# Patient Record
Sex: MALE | Race: BLACK OR AFRICAN AMERICAN | NOT HISPANIC OR LATINO
[De-identification: names, ages, dates, MRNs, and addresses within clinical notes are randomized per-mention and may not be internally consistent; named-entity substitution may affect disease eponyms.]

---

## 2017-01-11 ENCOUNTER — APPOINTMENT (OUTPATIENT)
Dept: VASCULAR SURGERY | Facility: CLINIC | Age: 77
End: 2017-01-11

## 2017-01-11 ENCOUNTER — APPOINTMENT (OUTPATIENT)
Dept: WOUND CARE | Facility: CLINIC | Age: 77
End: 2017-01-11

## 2017-02-06 ENCOUNTER — APPOINTMENT (OUTPATIENT)
Dept: WOUND CARE | Facility: CLINIC | Age: 77
End: 2017-02-06

## 2017-03-29 ENCOUNTER — APPOINTMENT (OUTPATIENT)
Dept: WOUND CARE | Facility: CLINIC | Age: 77
End: 2017-03-29

## 2017-04-12 ENCOUNTER — APPOINTMENT (OUTPATIENT)
Dept: WOUND CARE | Facility: CLINIC | Age: 77
End: 2017-04-12

## 2017-05-19 ENCOUNTER — APPOINTMENT (OUTPATIENT)
Dept: WOUND CARE | Facility: CLINIC | Age: 77
End: 2017-05-19

## 2017-05-19 VITALS
HEIGHT: 68 IN | RESPIRATION RATE: 16 BRPM | HEART RATE: 77 BPM | SYSTOLIC BLOOD PRESSURE: 172 MMHG | TEMPERATURE: 97.6 F | DIASTOLIC BLOOD PRESSURE: 73 MMHG

## 2017-05-19 RX ORDER — COLLAGENASE SANTYL 250 [ARB'U]/G
250 OINTMENT TOPICAL DAILY
Qty: 90 | Refills: 5 | Status: ACTIVE | COMMUNITY
Start: 2017-05-19 | End: 1900-01-01

## 2017-05-22 ENCOUNTER — APPOINTMENT (OUTPATIENT)
Dept: MRI IMAGING | Facility: CLINIC | Age: 77
End: 2017-05-22

## 2017-06-03 ENCOUNTER — OUTPATIENT (OUTPATIENT)
Dept: OUTPATIENT SERVICES | Facility: HOSPITAL | Age: 77
LOS: 1 days | End: 2017-06-03
Payer: MEDICARE

## 2017-06-03 ENCOUNTER — APPOINTMENT (OUTPATIENT)
Dept: MRI IMAGING | Facility: CLINIC | Age: 77
End: 2017-06-03

## 2017-06-03 DIAGNOSIS — Z00.8 ENCOUNTER FOR OTHER GENERAL EXAMINATION: ICD-10-CM

## 2017-06-03 PROCEDURE — 73718 MRI LOWER EXTREMITY W/O DYE: CPT

## 2017-06-07 ENCOUNTER — APPOINTMENT (OUTPATIENT)
Dept: WOUND CARE | Facility: CLINIC | Age: 77
End: 2017-06-07

## 2017-06-07 VITALS
BODY MASS INDEX: 27.58 KG/M2 | HEART RATE: 63 BPM | WEIGHT: 182 LBS | SYSTOLIC BLOOD PRESSURE: 164 MMHG | DIASTOLIC BLOOD PRESSURE: 85 MMHG | HEIGHT: 68 IN | RESPIRATION RATE: 16 BRPM

## 2017-07-05 ENCOUNTER — APPOINTMENT (OUTPATIENT)
Dept: WOUND CARE | Facility: CLINIC | Age: 77
End: 2017-07-05

## 2017-07-11 ENCOUNTER — APPOINTMENT (OUTPATIENT)
Dept: WOUND CARE | Facility: CLINIC | Age: 77
End: 2017-07-11
Payer: MEDICARE

## 2017-07-11 PROCEDURE — 11042 DBRDMT SUBQ TIS 1ST 20SQCM/<: CPT

## 2017-07-19 ENCOUNTER — APPOINTMENT (OUTPATIENT)
Dept: WOUND CARE | Facility: CLINIC | Age: 77
End: 2017-07-19

## 2017-08-01 ENCOUNTER — APPOINTMENT (OUTPATIENT)
Dept: WOUND CARE | Facility: CLINIC | Age: 77
End: 2017-08-01
Payer: MEDICARE

## 2017-08-01 VITALS
DIASTOLIC BLOOD PRESSURE: 81 MMHG | TEMPERATURE: 98.6 F | BODY MASS INDEX: 27.58 KG/M2 | HEIGHT: 68 IN | SYSTOLIC BLOOD PRESSURE: 157 MMHG | WEIGHT: 182 LBS | RESPIRATION RATE: 16 BRPM | HEART RATE: 68 BPM

## 2017-08-01 PROCEDURE — 11042 DBRDMT SUBQ TIS 1ST 20SQCM/<: CPT

## 2017-08-11 ENCOUNTER — APPOINTMENT (OUTPATIENT)
Dept: WOUND CARE | Facility: CLINIC | Age: 77
End: 2017-08-11
Payer: MEDICARE

## 2017-08-11 VITALS
WEIGHT: 182 LBS | HEART RATE: 59 BPM | DIASTOLIC BLOOD PRESSURE: 96 MMHG | HEIGHT: 68 IN | TEMPERATURE: 97.5 F | SYSTOLIC BLOOD PRESSURE: 164 MMHG | BODY MASS INDEX: 27.58 KG/M2 | RESPIRATION RATE: 16 BRPM

## 2017-08-11 PROCEDURE — 11042 DBRDMT SUBQ TIS 1ST 20SQCM/<: CPT

## 2017-08-11 PROCEDURE — 99214 OFFICE O/P EST MOD 30 MIN: CPT

## 2017-09-01 ENCOUNTER — APPOINTMENT (OUTPATIENT)
Dept: WOUND CARE | Facility: CLINIC | Age: 77
End: 2017-09-01
Payer: MEDICARE

## 2017-09-01 VITALS
DIASTOLIC BLOOD PRESSURE: 60 MMHG | SYSTOLIC BLOOD PRESSURE: 150 MMHG | HEART RATE: 60 BPM | TEMPERATURE: 98.6 F | RESPIRATION RATE: 16 BRPM

## 2017-09-01 PROCEDURE — 11042 DBRDMT SUBQ TIS 1ST 20SQCM/<: CPT

## 2017-09-13 LAB — BACTERIA WND CULT: ABNORMAL

## 2017-09-29 ENCOUNTER — APPOINTMENT (OUTPATIENT)
Dept: WOUND CARE | Facility: CLINIC | Age: 77
End: 2017-09-29
Payer: MEDICARE

## 2017-09-29 PROCEDURE — 99213 OFFICE O/P EST LOW 20 MIN: CPT

## 2017-09-29 RX ORDER — LUBIPROSTONE 24 UG/1
24 CAPSULE, GELATIN COATED ORAL
Qty: 60 | Refills: 0 | Status: ACTIVE | COMMUNITY
Start: 2017-06-30

## 2017-09-29 RX ORDER — OXYCODONE AND ACETAMINOPHEN 5; 325 MG/1; MG/1
5-325 TABLET ORAL
Qty: 120 | Refills: 0 | Status: COMPLETED | COMMUNITY
Start: 2017-05-05

## 2017-09-29 RX ORDER — DONEPEZIL HYDROCHLORIDE 5 MG/1
5 TABLET ORAL
Qty: 30 | Refills: 0 | Status: ACTIVE | COMMUNITY
Start: 2017-03-02

## 2017-09-29 RX ORDER — ASPIRIN 81 MG/1
81 TABLET, CHEWABLE ORAL
Qty: 90 | Refills: 0 | Status: DISCONTINUED | COMMUNITY
Start: 2017-03-02

## 2017-09-29 RX ORDER — DOCUSATE SODIUM 100 MG/1
100 CAPSULE ORAL
Qty: 60 | Refills: 0 | Status: ACTIVE | COMMUNITY
Start: 2017-03-02

## 2017-09-29 RX ORDER — CLINDAMYCIN HYDROCHLORIDE 300 MG/1
300 CAPSULE ORAL
Qty: 30 | Refills: 0 | Status: COMPLETED | COMMUNITY
Start: 2017-07-11 | End: 2017-09-29

## 2017-09-29 RX ORDER — METOPROLOL SUCCINATE 50 MG/1
50 TABLET, EXTENDED RELEASE ORAL
Qty: 90 | Refills: 0 | Status: ACTIVE | COMMUNITY
Start: 2017-09-18

## 2017-09-29 RX ORDER — MUPIROCIN 20 MG/G
2 OINTMENT TOPICAL
Qty: 60 | Refills: 3 | Status: COMPLETED | COMMUNITY
Start: 2017-07-11 | End: 2017-09-29

## 2017-09-29 RX ORDER — FLUTICASONE PROPIONATE 50 UG/1
50 SPRAY, METERED NASAL
Qty: 16 | Refills: 0 | Status: ACTIVE | COMMUNITY
Start: 2017-03-24

## 2017-09-29 RX ORDER — HYDROCODONE BITARTRATE AND ACETAMINOPHEN 7.5; 325 MG/1; MG/1
7.5-325 TABLET ORAL
Qty: 120 | Refills: 0 | Status: ACTIVE | COMMUNITY
Start: 2017-06-30

## 2017-09-29 RX ORDER — CHLORHEXIDINE GLUCONATE 4 %
325 (65 FE) LIQUID (ML) TOPICAL
Qty: 30 | Refills: 0 | Status: ACTIVE | COMMUNITY
Start: 2016-08-08

## 2017-10-25 ENCOUNTER — APPOINTMENT (OUTPATIENT)
Dept: WOUND CARE | Facility: CLINIC | Age: 77
End: 2017-10-25

## 2017-11-10 ENCOUNTER — APPOINTMENT (OUTPATIENT)
Dept: WOUND CARE | Facility: CLINIC | Age: 77
End: 2017-11-10
Payer: MEDICARE

## 2017-11-10 DIAGNOSIS — I77.1 STRICTURE OF ARTERY: ICD-10-CM

## 2017-11-10 DIAGNOSIS — L98.499 STRICTURE OF ARTERY: ICD-10-CM

## 2017-11-10 DIAGNOSIS — S86.029A: ICD-10-CM

## 2017-11-10 PROCEDURE — 11042 DBRDMT SUBQ TIS 1ST 20SQCM/<: CPT | Mod: 58

## 2017-12-01 ENCOUNTER — APPOINTMENT (OUTPATIENT)
Dept: WOUND CARE | Facility: CLINIC | Age: 77
End: 2017-12-01
Payer: MEDICARE

## 2017-12-01 VITALS
HEART RATE: 77 BPM | RESPIRATION RATE: 16 BRPM | SYSTOLIC BLOOD PRESSURE: 166 MMHG | TEMPERATURE: 97.6 F | DIASTOLIC BLOOD PRESSURE: 91 MMHG | BODY MASS INDEX: 27.58 KG/M2 | HEIGHT: 68 IN | WEIGHT: 182 LBS

## 2017-12-01 DIAGNOSIS — L97.529 NON-PRESSURE CHRONIC ULCER OF OTHER PART OF LEFT FOOT WITH UNSPECIFIED SEVERITY: ICD-10-CM

## 2017-12-01 DIAGNOSIS — L97.922 NON-PRESSURE CHRONIC ULCER OF UNSPECIFIED PART OF LEFT LOWER LEG WITH FAT LAYER EXPOSED: ICD-10-CM

## 2017-12-01 PROCEDURE — 11042 DBRDMT SUBQ TIS 1ST 20SQCM/<: CPT | Mod: 58

## 2017-12-01 PROCEDURE — 99213 OFFICE O/P EST LOW 20 MIN: CPT | Mod: 25

## 2018-01-03 ENCOUNTER — APPOINTMENT (OUTPATIENT)
Dept: WOUND CARE | Facility: CLINIC | Age: 78
End: 2018-01-03
Payer: MEDICARE

## 2018-01-03 VITALS
RESPIRATION RATE: 16 BRPM | DIASTOLIC BLOOD PRESSURE: 102 MMHG | HEART RATE: 76 BPM | TEMPERATURE: 98.4 F | SYSTOLIC BLOOD PRESSURE: 178 MMHG

## 2018-01-03 DIAGNOSIS — I89.0 LYMPHEDEMA, NOT ELSEWHERE CLASSIFIED: ICD-10-CM

## 2018-01-03 PROCEDURE — 99213 OFFICE O/P EST LOW 20 MIN: CPT | Mod: 25

## 2018-01-03 PROCEDURE — 11042 DBRDMT SUBQ TIS 1ST 20SQCM/<: CPT

## 2018-02-02 ENCOUNTER — APPOINTMENT (OUTPATIENT)
Dept: WOUND CARE | Facility: CLINIC | Age: 78
End: 2018-02-02
Payer: MEDICARE

## 2018-02-02 VITALS
SYSTOLIC BLOOD PRESSURE: 148 MMHG | RESPIRATION RATE: 16 BRPM | TEMPERATURE: 98.4 F | DIASTOLIC BLOOD PRESSURE: 80 MMHG | HEART RATE: 78 BPM

## 2018-02-02 PROCEDURE — 99213 OFFICE O/P EST LOW 20 MIN: CPT

## 2018-02-28 ENCOUNTER — APPOINTMENT (OUTPATIENT)
Dept: WOUND CARE | Facility: CLINIC | Age: 78
End: 2018-02-28
Payer: MEDICARE

## 2018-02-28 PROCEDURE — 97597 DBRDMT OPN WND 1ST 20 CM/<: CPT

## 2018-02-28 RX ORDER — LIDOCAINE AND PRILOCAINE 25; 25 MG/G; MG/G
2.5-2.5 CREAM TOPICAL
Qty: 1 | Refills: 3 | Status: ACTIVE | COMMUNITY
Start: 2018-02-28 | End: 1900-01-01

## 2018-03-16 ENCOUNTER — APPOINTMENT (OUTPATIENT)
Dept: WOUND CARE | Facility: CLINIC | Age: 78
End: 2018-03-16
Payer: MEDICARE

## 2018-03-16 PROCEDURE — 99213 OFFICE O/P EST LOW 20 MIN: CPT

## 2018-03-29 ENCOUNTER — APPOINTMENT (OUTPATIENT)
Dept: VASCULAR SURGERY | Facility: CLINIC | Age: 78
End: 2018-03-29

## 2018-03-29 ENCOUNTER — EMERGENCY (EMERGENCY)
Facility: HOSPITAL | Age: 78
LOS: 1 days | Discharge: HOME | End: 2018-03-29
Attending: EMERGENCY MEDICINE | Admitting: EMERGENCY MEDICINE
Payer: MEDICARE

## 2018-03-29 VITALS
HEIGHT: 68 IN | WEIGHT: 182 LBS | DIASTOLIC BLOOD PRESSURE: 123 MMHG | BODY MASS INDEX: 27.58 KG/M2 | HEART RATE: 88 BPM | SYSTOLIC BLOOD PRESSURE: 222 MMHG

## 2018-03-29 VITALS — HEART RATE: 91 BPM | SYSTOLIC BLOOD PRESSURE: 217 MMHG | DIASTOLIC BLOOD PRESSURE: 138 MMHG

## 2018-03-29 VITALS
HEART RATE: 75 BPM | TEMPERATURE: 98 F | RESPIRATION RATE: 20 BRPM | DIASTOLIC BLOOD PRESSURE: 91 MMHG | OXYGEN SATURATION: 100 % | SYSTOLIC BLOOD PRESSURE: 163 MMHG

## 2018-03-29 PROCEDURE — 99283 EMERGENCY DEPT VISIT LOW MDM: CPT

## 2018-03-29 PROCEDURE — 71046 X-RAY EXAM CHEST 2 VIEWS: CPT | Mod: 26

## 2018-03-29 NOTE — ED PROVIDER NOTE - OBJECTIVE STATEMENT
77y M hx of borderline diabetes and chronic right foot ulceration presenting s/p choking episode. The family notes pt was at vascular surgeon's office and choked on a cookie. After the episode pt was hypertensive with BP of 200. No syncope, no cp, no vomiting. Pt currently denies any complaints. No sob, no difficulty swallowing. Daughter notes in the past pt had difficulty swallowing and is being seen by a speech therapist.

## 2018-03-29 NOTE — ED PROVIDER NOTE - MEDICAL DECISION MAKING DETAILS
77y M s/p choking episode and HTN. Blood pressure significantly improved on arrival. No evidence of respiratory distress. Will obtain CXR to r/o aspiration injury, PO challenge, and discharge home.

## 2018-03-29 NOTE — ED PROVIDER NOTE - PMH
BPH (benign prostatic hypertrophy)    Diabetes mellitus  Type 2 NIDDM  GERD (gastroesophageal reflux disease)    Hyperlipidemia    Hypertension    Obesity    PVD (peripheral vascular disease)    Ulcer of foot

## 2018-03-29 NOTE — ED PROVIDER NOTE - PROGRESS NOTE DETAILS
AJM: CXR unremarkable for acute events. again no resp distress or coughing. stable for dc home. All results discussed with the patient, and a copy of results has been provided. Patient is comfortable with dc plan for home. Opportunity for questions was provided and all questions have been addressed.

## 2018-03-29 NOTE — ED ADULT TRIAGE NOTE - CHIEF COMPLAINT QUOTE
Pt was at vascular surgeon for unhealed ulcer on Rt foot, and started choking on a cookie, when pt had his vitals taken he was hypertensive and was instructed to go to ED for htn and possible aspiration, pt a&ox3, no resp distress at this time. Pt breathing even and unlabored, speaking full sentences. Pt was at vascular surgeon for unhealed ulcer on Lt foot, and started choking on a cookie, when pt had his vitals taken he was hypertensive and was instructed to go to ED for htn and possible aspiration, pt a&ox3, no resp distress at this time. Pt breathing even and unlabored, speaking full sentences.

## 2018-04-11 ENCOUNTER — APPOINTMENT (OUTPATIENT)
Dept: WOUND CARE | Facility: CLINIC | Age: 78
End: 2018-04-11
Payer: MEDICARE

## 2018-04-11 VITALS
TEMPERATURE: 98.14 F | DIASTOLIC BLOOD PRESSURE: 82 MMHG | SYSTOLIC BLOOD PRESSURE: 166 MMHG | BODY MASS INDEX: 27.58 KG/M2 | HEART RATE: 86 BPM | WEIGHT: 182 LBS | HEIGHT: 68 IN

## 2018-04-11 PROCEDURE — 29581 APPL MULTLAYER CMPRN SYS LEG: CPT | Mod: RT

## 2018-04-25 ENCOUNTER — APPOINTMENT (OUTPATIENT)
Dept: WOUND CARE | Facility: CLINIC | Age: 78
End: 2018-04-25
Payer: MEDICARE

## 2018-04-25 VITALS
WEIGHT: 182 LBS | DIASTOLIC BLOOD PRESSURE: 84 MMHG | BODY MASS INDEX: 27.58 KG/M2 | HEART RATE: 79 BPM | HEIGHT: 68 IN | SYSTOLIC BLOOD PRESSURE: 178 MMHG | RESPIRATION RATE: 18 BRPM | TEMPERATURE: 98 F

## 2018-04-25 DIAGNOSIS — Z86.39 PERSONAL HISTORY OF OTHER ENDOCRINE, NUTRITIONAL AND METABOLIC DISEASE: ICD-10-CM

## 2018-04-25 PROCEDURE — 29581 APPL MULTLAYER CMPRN SYS LEG: CPT | Mod: RT

## 2018-05-14 ENCOUNTER — APPOINTMENT (OUTPATIENT)
Dept: WOUND CARE | Facility: CLINIC | Age: 78
End: 2018-05-14
Payer: MEDICARE

## 2018-05-14 VITALS
BODY MASS INDEX: 27.58 KG/M2 | DIASTOLIC BLOOD PRESSURE: 104 MMHG | TEMPERATURE: 98.3 F | WEIGHT: 182 LBS | SYSTOLIC BLOOD PRESSURE: 182 MMHG | HEART RATE: 84 BPM | HEIGHT: 68 IN

## 2018-05-14 PROCEDURE — 29581 APPL MULTLAYER CMPRN SYS LEG: CPT | Mod: RT

## 2018-05-30 ENCOUNTER — APPOINTMENT (OUTPATIENT)
Dept: WOUND CARE | Facility: CLINIC | Age: 78
End: 2018-05-30
Payer: MEDICARE

## 2018-05-30 VITALS
WEIGHT: 182 LBS | SYSTOLIC BLOOD PRESSURE: 164 MMHG | BODY MASS INDEX: 27.58 KG/M2 | DIASTOLIC BLOOD PRESSURE: 93 MMHG | TEMPERATURE: 98.2 F | HEART RATE: 78 BPM | HEIGHT: 68 IN | RESPIRATION RATE: 18 BRPM

## 2018-05-30 PROCEDURE — 15271 SKIN SUB GRAFT TRNK/ARM/LEG: CPT

## 2018-06-11 ENCOUNTER — APPOINTMENT (OUTPATIENT)
Dept: WOUND CARE | Facility: CLINIC | Age: 78
End: 2018-06-11
Payer: MEDICARE

## 2018-06-11 VITALS
DIASTOLIC BLOOD PRESSURE: 74 MMHG | HEART RATE: 83 BPM | RESPIRATION RATE: 18 BRPM | BODY MASS INDEX: 27.58 KG/M2 | TEMPERATURE: 98.6 F | WEIGHT: 182 LBS | HEIGHT: 68 IN | SYSTOLIC BLOOD PRESSURE: 155 MMHG

## 2018-06-11 PROCEDURE — 29581 APPL MULTLAYER CMPRN SYS LEG: CPT | Mod: RT

## 2018-06-27 ENCOUNTER — APPOINTMENT (OUTPATIENT)
Dept: WOUND CARE | Facility: CLINIC | Age: 78
End: 2018-06-27
Payer: MEDICARE

## 2018-06-27 VITALS
SYSTOLIC BLOOD PRESSURE: 162 MMHG | HEIGHT: 68 IN | DIASTOLIC BLOOD PRESSURE: 80 MMHG | TEMPERATURE: 98.2 F | HEART RATE: 87 BPM | BODY MASS INDEX: 27.58 KG/M2 | WEIGHT: 182 LBS

## 2018-06-27 DIAGNOSIS — I70.245 ATHEROSCLEROSIS OF NATIVE ARTERIES OF RIGHT LEG WITH ULCERATION OF OTHER PART OF FOOT: ICD-10-CM

## 2018-06-27 DIAGNOSIS — L97.512 NON-PRESSURE CHRONIC ULCER OF OTHER PART OF RIGHT FOOT WITH FAT LAYER EXPOSED: ICD-10-CM

## 2018-06-27 DIAGNOSIS — I70.235 ATHEROSCLEROSIS OF NATIVE ARTERIES OF RIGHT LEG WITH ULCERATION OF OTHER PART OF FOOT: ICD-10-CM

## 2018-06-27 PROCEDURE — 15271 SKIN SUB GRAFT TRNK/ARM/LEG: CPT

## 2018-07-06 ENCOUNTER — APPOINTMENT (OUTPATIENT)
Dept: WOUND CARE | Facility: CLINIC | Age: 78
End: 2018-07-06

## 2018-07-09 ENCOUNTER — APPOINTMENT (OUTPATIENT)
Dept: WOUND CARE | Facility: CLINIC | Age: 78
End: 2018-07-09
Payer: MEDICARE

## 2018-07-09 PROCEDURE — 29581 APPL MULTLAYER CMPRN SYS LEG: CPT | Mod: RT

## 2018-07-22 PROBLEM — S86.029A: Status: ACTIVE | Noted: 2017-11-10

## 2018-07-27 ENCOUNTER — APPOINTMENT (OUTPATIENT)
Dept: WOUND CARE | Facility: CLINIC | Age: 78
End: 2018-07-27
Payer: MEDICARE

## 2018-07-27 VITALS
TEMPERATURE: 98 F | WEIGHT: 182 LBS | HEART RATE: 73 BPM | BODY MASS INDEX: 27.58 KG/M2 | HEIGHT: 68 IN | DIASTOLIC BLOOD PRESSURE: 73 MMHG | RESPIRATION RATE: 16 BRPM | SYSTOLIC BLOOD PRESSURE: 120 MMHG

## 2018-07-27 PROCEDURE — 99213 OFFICE O/P EST LOW 20 MIN: CPT

## 2018-08-08 ENCOUNTER — APPOINTMENT (OUTPATIENT)
Dept: WOUND CARE | Facility: CLINIC | Age: 78
End: 2018-08-08
Payer: MEDICARE

## 2018-08-08 PROCEDURE — 15271 SKIN SUB GRAFT TRNK/ARM/LEG: CPT

## 2018-08-08 RX ORDER — BLOOD-GLUCOSE METER
KIT MISCELLANEOUS
Qty: 1 | Refills: 0 | Status: ACTIVE | COMMUNITY
Start: 2018-02-15

## 2018-08-08 RX ORDER — TAMSULOSIN HYDROCHLORIDE 0.4 MG/1
0.4 CAPSULE ORAL
Qty: 90 | Refills: 0 | Status: ACTIVE | COMMUNITY
Start: 2018-05-05

## 2018-08-08 RX ORDER — LANCETS 28 GAUGE
EACH MISCELLANEOUS
Qty: 100 | Refills: 0 | Status: ACTIVE | COMMUNITY
Start: 2018-02-15

## 2018-08-08 RX ORDER — BLOOD SUGAR DIAGNOSTIC
STRIP MISCELLANEOUS
Qty: 100 | Refills: 0 | Status: ACTIVE | COMMUNITY
Start: 2018-02-15

## 2018-08-17 ENCOUNTER — APPOINTMENT (OUTPATIENT)
Dept: WOUND CARE | Facility: CLINIC | Age: 78
End: 2018-08-17
Payer: MEDICARE

## 2018-08-17 VITALS — TEMPERATURE: 98.4 F | DIASTOLIC BLOOD PRESSURE: 71 MMHG | HEART RATE: 61 BPM | SYSTOLIC BLOOD PRESSURE: 116 MMHG

## 2018-08-17 PROCEDURE — 29581 APPL MULTLAYER CMPRN SYS LEG: CPT | Mod: RT

## 2018-08-29 ENCOUNTER — APPOINTMENT (OUTPATIENT)
Dept: WOUND CARE | Facility: CLINIC | Age: 78
End: 2018-08-29
Payer: MEDICARE

## 2018-08-29 DIAGNOSIS — M21.619 BUNION OF UNSPECIFIED FOOT: ICD-10-CM

## 2018-08-29 DIAGNOSIS — B35.1 TINEA UNGUIUM: ICD-10-CM

## 2018-08-29 PROCEDURE — 99212 OFFICE O/P EST SF 10 MIN: CPT

## 2018-09-05 ENCOUNTER — APPOINTMENT (OUTPATIENT)
Dept: WOUND CARE | Facility: CLINIC | Age: 78
End: 2018-09-05
Payer: MEDICARE

## 2018-09-05 VITALS — HEART RATE: 69 BPM | SYSTOLIC BLOOD PRESSURE: 110 MMHG | DIASTOLIC BLOOD PRESSURE: 70 MMHG

## 2018-09-05 PROCEDURE — 99213 OFFICE O/P EST LOW 20 MIN: CPT

## 2018-09-26 ENCOUNTER — APPOINTMENT (OUTPATIENT)
Dept: WOUND CARE | Facility: CLINIC | Age: 78
End: 2018-09-26
Payer: MEDICARE

## 2018-09-26 VITALS — HEART RATE: 59 BPM | SYSTOLIC BLOOD PRESSURE: 133 MMHG | DIASTOLIC BLOOD PRESSURE: 79 MMHG | TEMPERATURE: 98.2 F

## 2018-09-26 PROCEDURE — 15271 SKIN SUB GRAFT TRNK/ARM/LEG: CPT

## 2018-10-03 ENCOUNTER — APPOINTMENT (OUTPATIENT)
Dept: WOUND CARE | Facility: CLINIC | Age: 78
End: 2018-10-03
Payer: MEDICARE

## 2018-10-03 VITALS — HEART RATE: 90 BPM | TEMPERATURE: 98.4 F | SYSTOLIC BLOOD PRESSURE: 117 MMHG | DIASTOLIC BLOOD PRESSURE: 70 MMHG

## 2018-10-03 PROCEDURE — 29581 APPL MULTLAYER CMPRN SYS LEG: CPT | Mod: RT

## 2018-10-29 ENCOUNTER — APPOINTMENT (OUTPATIENT)
Dept: WOUND CARE | Facility: CLINIC | Age: 78
End: 2018-10-29
Payer: MEDICARE

## 2018-10-29 PROCEDURE — 29581 APPL MULTLAYER CMPRN SYS LEG: CPT | Mod: RT

## 2018-11-19 ENCOUNTER — APPOINTMENT (OUTPATIENT)
Dept: WOUND CARE | Facility: CLINIC | Age: 78
End: 2018-11-19
Payer: MEDICARE

## 2018-11-19 VITALS — HEART RATE: 74 BPM | TEMPERATURE: 98.3 F | SYSTOLIC BLOOD PRESSURE: 136 MMHG | DIASTOLIC BLOOD PRESSURE: 80 MMHG

## 2018-11-19 PROCEDURE — 29581 APPL MULTLAYER CMPRN SYS LEG: CPT | Mod: RT

## 2018-12-12 ENCOUNTER — APPOINTMENT (OUTPATIENT)
Dept: WOUND CARE | Facility: CLINIC | Age: 78
End: 2018-12-12
Payer: MEDICARE

## 2018-12-12 DIAGNOSIS — L97.919 CHRONIC VENOUS HYPERTENSION (IDIOPATHIC) WITH ULCER OF RIGHT LOWER EXTREMITY: ICD-10-CM

## 2018-12-12 DIAGNOSIS — I87.311 CHRONIC VENOUS HYPERTENSION (IDIOPATHIC) WITH ULCER OF RIGHT LOWER EXTREMITY: ICD-10-CM

## 2018-12-12 PROCEDURE — 29581 APPL MULTLAYER CMPRN SYS LEG: CPT | Mod: RT

## 2018-12-31 ENCOUNTER — APPOINTMENT (OUTPATIENT)
Dept: WOUND CARE | Facility: CLINIC | Age: 78
End: 2018-12-31
Payer: MEDICARE

## 2018-12-31 PROCEDURE — 99213 OFFICE O/P EST LOW 20 MIN: CPT

## 2019-02-01 ENCOUNTER — EMERGENCY (EMERGENCY)
Facility: HOSPITAL | Age: 79
LOS: 0 days | Discharge: HOME | End: 2019-02-01
Attending: EMERGENCY MEDICINE
Payer: MEDICARE

## 2019-02-01 VITALS
TEMPERATURE: 98 F | SYSTOLIC BLOOD PRESSURE: 139 MMHG | HEART RATE: 70 BPM | OXYGEN SATURATION: 96 % | DIASTOLIC BLOOD PRESSURE: 74 MMHG | HEIGHT: 65 IN | RESPIRATION RATE: 18 BRPM | WEIGHT: 190.04 LBS

## 2019-02-01 VITALS
HEART RATE: 60 BPM | RESPIRATION RATE: 18 BRPM | SYSTOLIC BLOOD PRESSURE: 133 MMHG | OXYGEN SATURATION: 98 % | TEMPERATURE: 98 F | DIASTOLIC BLOOD PRESSURE: 64 MMHG

## 2019-02-01 DIAGNOSIS — Z88.0 ALLERGY STATUS TO PENICILLIN: ICD-10-CM

## 2019-02-01 DIAGNOSIS — R55 SYNCOPE AND COLLAPSE: ICD-10-CM

## 2019-02-01 DIAGNOSIS — R73.03 PREDIABETES: ICD-10-CM

## 2019-02-01 LAB
ALBUMIN SERPL ELPH-MCNC: 3 G/DL — LOW (ref 3.3–5)
ALP SERPL-CCNC: 85 U/L — SIGNIFICANT CHANGE UP (ref 40–120)
ALT FLD-CCNC: 16 U/L — SIGNIFICANT CHANGE UP (ref 12–78)
AMMONIA BLD-MCNC: 32 UMOL/L — SIGNIFICANT CHANGE UP (ref 11–32)
ANION GAP SERPL CALC-SCNC: 8 MMOL/L — SIGNIFICANT CHANGE UP (ref 5–17)
APAP SERPL-MCNC: 11 UG/ML — SIGNIFICANT CHANGE UP (ref 10–30)
APPEARANCE UR: CLEAR — SIGNIFICANT CHANGE UP
APTT BLD: 26.3 SEC — LOW (ref 27.5–36.3)
AST SERPL-CCNC: 9 U/L — LOW (ref 15–37)
BILIRUB SERPL-MCNC: 0.2 MG/DL — SIGNIFICANT CHANGE UP (ref 0.2–1.2)
BILIRUB UR-MCNC: NEGATIVE — SIGNIFICANT CHANGE UP
BUN SERPL-MCNC: 28 MG/DL — HIGH (ref 7–23)
CALCIUM SERPL-MCNC: 8.6 MG/DL — SIGNIFICANT CHANGE UP (ref 8.5–10.1)
CHLORIDE SERPL-SCNC: 104 MMOL/L — SIGNIFICANT CHANGE UP (ref 96–108)
CK MB CFR SERPL CALC: 2.1 NG/ML — SIGNIFICANT CHANGE UP (ref 0.5–3.6)
CO2 SERPL-SCNC: 25 MMOL/L — SIGNIFICANT CHANGE UP (ref 22–31)
COLOR SPEC: YELLOW — SIGNIFICANT CHANGE UP
CREAT SERPL-MCNC: 2.08 MG/DL — HIGH (ref 0.5–1.3)
DIFF PNL FLD: NEGATIVE — SIGNIFICANT CHANGE UP
GLUCOSE BLDC GLUCOMTR-MCNC: 297 MG/DL — HIGH (ref 70–99)
GLUCOSE SERPL-MCNC: 338 MG/DL — HIGH (ref 70–99)
GLUCOSE UR QL: 1000 MG/DL
HCT VFR BLD CALC: 30.8 % — LOW (ref 39–50)
HGB BLD-MCNC: 10.9 G/DL — LOW (ref 13–17)
INR BLD: 0.99 RATIO — SIGNIFICANT CHANGE UP (ref 0.88–1.16)
KETONES UR-MCNC: NEGATIVE — SIGNIFICANT CHANGE UP
LACTATE SERPL-SCNC: 3.4 MMOL/L — HIGH (ref 0.7–2)
LACTATE SERPL-SCNC: 4.9 MMOL/L — CRITICAL HIGH (ref 0.7–2)
LEUKOCYTE ESTERASE UR-ACNC: NEGATIVE — SIGNIFICANT CHANGE UP
LIDOCAIN IGE QN: 97 U/L — SIGNIFICANT CHANGE UP (ref 73–393)
MCHC RBC-ENTMCNC: 31.4 PG — SIGNIFICANT CHANGE UP (ref 27–34)
MCHC RBC-ENTMCNC: 35.4 GM/DL — SIGNIFICANT CHANGE UP (ref 32–36)
MCV RBC AUTO: 88.8 FL — SIGNIFICANT CHANGE UP (ref 80–100)
NITRITE UR-MCNC: NEGATIVE — SIGNIFICANT CHANGE UP
NRBC # BLD: 0 /100 WBCS — SIGNIFICANT CHANGE UP (ref 0–0)
PH UR: 5 — SIGNIFICANT CHANGE UP (ref 5–8)
PLATELET # BLD AUTO: 260 K/UL — SIGNIFICANT CHANGE UP (ref 150–400)
POTASSIUM SERPL-MCNC: 4.5 MMOL/L — SIGNIFICANT CHANGE UP (ref 3.5–5.3)
POTASSIUM SERPL-SCNC: 4.5 MMOL/L — SIGNIFICANT CHANGE UP (ref 3.5–5.3)
PROT SERPL-MCNC: 7.9 GM/DL — SIGNIFICANT CHANGE UP (ref 6–8.3)
PROT UR-MCNC: NEGATIVE MG/DL — SIGNIFICANT CHANGE UP
PROTHROM AB SERPL-ACNC: 11.1 SEC — SIGNIFICANT CHANGE UP (ref 10–12.9)
RBC # BLD: 3.47 M/UL — LOW (ref 4.2–5.8)
RBC # FLD: 13 % — SIGNIFICANT CHANGE UP (ref 10.3–14.5)
SALICYLATES SERPL-MCNC: <1.7 MG/DL — LOW (ref 2.8–20)
SODIUM SERPL-SCNC: 137 MMOL/L — SIGNIFICANT CHANGE UP (ref 135–145)
SP GR SPEC: 1.01 — SIGNIFICANT CHANGE UP (ref 1.01–1.02)
TROPONIN I SERPL-MCNC: <.015 NG/ML — SIGNIFICANT CHANGE UP (ref 0.01–0.04)
UROBILINOGEN FLD QL: NEGATIVE MG/DL — SIGNIFICANT CHANGE UP
WBC # BLD: 7.18 K/UL — SIGNIFICANT CHANGE UP (ref 3.8–10.5)
WBC # FLD AUTO: 7.18 K/UL — SIGNIFICANT CHANGE UP (ref 3.8–10.5)

## 2019-02-01 PROCEDURE — 71045 X-RAY EXAM CHEST 1 VIEW: CPT | Mod: 26

## 2019-02-01 PROCEDURE — 99285 EMERGENCY DEPT VISIT HI MDM: CPT

## 2019-02-01 PROCEDURE — 70450 CT HEAD/BRAIN W/O DYE: CPT | Mod: 26

## 2019-02-01 RX ORDER — SODIUM CHLORIDE 9 MG/ML
2000 INJECTION INTRAMUSCULAR; INTRAVENOUS; SUBCUTANEOUS ONCE
Qty: 0 | Refills: 0 | Status: COMPLETED | OUTPATIENT
Start: 2019-02-01 | End: 2019-02-01

## 2019-02-01 RX ORDER — SODIUM CHLORIDE 9 MG/ML
1000 INJECTION INTRAMUSCULAR; INTRAVENOUS; SUBCUTANEOUS ONCE
Qty: 0 | Refills: 0 | Status: COMPLETED | OUTPATIENT
Start: 2019-02-01 | End: 2019-02-01

## 2019-02-01 RX ADMIN — SODIUM CHLORIDE 1000 MILLILITER(S): 9 INJECTION INTRAMUSCULAR; INTRAVENOUS; SUBCUTANEOUS at 12:28

## 2019-02-01 RX ADMIN — SODIUM CHLORIDE 1000 MILLILITER(S): 9 INJECTION INTRAMUSCULAR; INTRAVENOUS; SUBCUTANEOUS at 13:55

## 2019-02-01 RX ADMIN — SODIUM CHLORIDE 2000 MILLILITER(S): 9 INJECTION INTRAMUSCULAR; INTRAVENOUS; SUBCUTANEOUS at 17:52

## 2019-02-01 RX ADMIN — SODIUM CHLORIDE 2000 MILLILITER(S): 9 INJECTION INTRAMUSCULAR; INTRAVENOUS; SUBCUTANEOUS at 13:56

## 2019-02-01 NOTE — ED ADULT TRIAGE NOTE - CHIEF COMPLAINT QUOTE
as per ems " after using the bathroom pt was found slumped over in his chair, and lethargic." pt states I started blacking out. hx htn, dm urinary problems, dementia. pt also has a healing wound on right heel for the past 2 years.

## 2019-02-01 NOTE — ED PROVIDER NOTE - CARE PROVIDER_API CALL
Lalo Vasquez)  Cardiology; Interventional Cardiology  [address], [place] [id number]  Phone: [phone]  Fax: [phone]

## 2019-02-01 NOTE — ED PROVIDER NOTE - OBJECTIVE STATEMENT
77 yo M with syncopal event this morning.  Pt. finished eating breakfast, fell asleep in sitting position, family member could not wake him up for about 10 minutes.  Pt. was drooling and slumped to R side.  He notes this has been happening on a weekly basis.  He feels forgetful, but has no other specific complaints.  He feels baseline, acting baseline as per family member at bedside.  No other complaints, no recent trauma.   ROS: negative for fever, cough, headache, chest pain, shortness of breath, abd pain, nausea, vomiting, diarrhea, rash, paresthesia, and weakness--all other systems reviewed are negative.   PMH: borderline diabetes and chronic right foot ulceration; Meds: omeprazole, tamsulosin, Donepezil, losartan/hctz, atenolol, doxazosin, oxybutynin, cilostazol, glipizide; SH: Denies smoking/drinking/drug use 79 yo M with syncopal event this morning.  Pt. finished eating breakfast, fell asleep in sitting position, family member could not wake him up for about 10 minutes.  Pt. was drooling and slumped to R side.  He notes this has been happening on a weekly basis.  He feels forgetful, but has no other specific complaints.  He feels baseline, acting baseline as per family member at bedside.  No other complaints, no recent trauma.   ROS: negative for fever, cough, headache, chest pain, shortness of breath, abd pain, nausea, vomiting, diarrhea, rash, paresthesia, and weakness--all other systems reviewed are negative.   PMH: borderline diabetes and chronic right foot ulceration; Meds: omeprazole, tamsulosin, Donepezil, losartan/hctz, atenolol, doxazosin, oxybutynin, cilostazol, glipizide; SH: Denies smoking/drinking/drug use  PCP: Dr. Carbajal

## 2019-02-01 NOTE — ED PROVIDER NOTE - CARE PROVIDERS DIRECT ADDRESSES
[email or web address]

## 2019-02-01 NOTE — ED PROVIDER NOTE - MEDICAL DECISION MAKING DETAILS
77 yo M with syncopal event  -CT brain, basic labs, trop, ckmb, ammonia, blood cx, ua, cx, ekg, asa/tylenol levels, NS hydration, monitor  -f/u results, reeval

## 2019-02-01 NOTE — ED PROVIDER NOTE - PROGRESS NOTE DETAILS
pt. is at baseline now, labs and CT WNL, repeat lactate pending, pt. likely dehydrated given BUN/Cr also  spoke with PCP, Dr. walters, agrees with d/c after repeat lactate, syncopal event likely vasovagal and related to chronic medical issues, no evidence for arrhythmia or acute pathology warranting hospital stay at this time Results reported to patient--grossly benign, labs show improved lactate on repeat, dehydration, otherwise WNL  Pt. reports feeling better after meds  pt. agrees to f/u with primary care outpt., Dr. Carbajal  pt. understands to return to ED if symptoms worsen; will d/c as per plan

## 2019-02-01 NOTE — ED ADULT NURSE NOTE - NSIMPLEMENTINTERV_GEN_ALL_ED
Implemented All Fall Risk Interventions:  [place] to call system. Call bell, personal items and telephone within reach. Instruct patient to call for assistance. Room bathroom lighting operational. Non-slip footwear when patient is off stretcher. Physically safe environment: no spills, clutter or unnecessary equipment. Stretcher in lowest position, wheels locked, appropriate side rails in place. Provide visual cue, wrist band, yellow gown, etc. Monitor gait and stability. Monitor for mental status changes and reorient to person, place, and time. Review medications for side effects contributing to fall risk. Reinforce activity limits and safety measures with patient and family.

## 2019-02-01 NOTE — ED ADULT NURSE REASSESSMENT NOTE - NS ED NURSE REASSESS COMMENT FT1
Assuming care from previous RN, pt AOx4, denies SOB, resp even and unlabored, skin warm and dry, color good, denies pain/n/v, pt aware of plan of care and proficiency determined by successful teach back demonstration. Pt does not appear to be in any distress or discomfort at this time.  Awaiting ambulance . Holding discharge at this time.

## 2019-02-01 NOTE — ED ADULT NURSE NOTE - OBJECTIVE STATEMENT
Pt BIBA with the c/o syncope. As per ems " after using the bathroom pt was found slumped over in his chair, and lethargic." pt states I started blacking out. Pt also has a healing wound on right heel for the past 2 years.  As per Md's orders Iv sonya placed blood specimen obtained and sent to the lab. Pt stable and nursing care ongoing and safety maintained. Pt's daughter at bedside.

## 2019-02-02 LAB
CULTURE RESULTS: SIGNIFICANT CHANGE UP
SPECIMEN SOURCE: SIGNIFICANT CHANGE UP

## 2019-02-04 ENCOUNTER — INBOUND DOCUMENT (OUTPATIENT)
Age: 79
End: 2019-02-04

## 2019-02-06 LAB
CULTURE RESULTS: SIGNIFICANT CHANGE UP
CULTURE RESULTS: SIGNIFICANT CHANGE UP
SPECIMEN SOURCE: SIGNIFICANT CHANGE UP
SPECIMEN SOURCE: SIGNIFICANT CHANGE UP

## 2019-02-22 ENCOUNTER — INPATIENT (INPATIENT)
Facility: HOSPITAL | Age: 79
LOS: 12 days | Discharge: HOME HEALTH | End: 2019-03-07
Attending: INTERNAL MEDICINE | Admitting: INTERNAL MEDICINE
Payer: MEDICARE

## 2019-02-22 VITALS
SYSTOLIC BLOOD PRESSURE: 135 MMHG | DIASTOLIC BLOOD PRESSURE: 77 MMHG | HEIGHT: 68 IN | HEART RATE: 84 BPM | TEMPERATURE: 98 F | RESPIRATION RATE: 17 BRPM | WEIGHT: 184.97 LBS

## 2019-02-22 LAB
ACETONE SERPL-MCNC: NEGATIVE — SIGNIFICANT CHANGE UP
ALBUMIN SERPL ELPH-MCNC: 3 G/DL — LOW (ref 3.3–5)
ALBUMIN SERPL ELPH-MCNC: 3 G/DL — LOW (ref 3.3–5)
ALP SERPL-CCNC: 118 U/L — SIGNIFICANT CHANGE UP (ref 40–120)
ALP SERPL-CCNC: 127 U/L — HIGH (ref 40–120)
ALT FLD-CCNC: 40 U/L — SIGNIFICANT CHANGE UP (ref 12–78)
ALT FLD-CCNC: 41 U/L — SIGNIFICANT CHANGE UP (ref 12–78)
ANION GAP SERPL CALC-SCNC: 11 MMOL/L — SIGNIFICANT CHANGE UP (ref 5–17)
ANION GAP SERPL CALC-SCNC: 12 MMOL/L — SIGNIFICANT CHANGE UP (ref 5–17)
APPEARANCE UR: CLEAR — SIGNIFICANT CHANGE UP
APTT BLD: 24.1 SEC — LOW (ref 28.5–37)
AST SERPL-CCNC: 23 U/L — SIGNIFICANT CHANGE UP (ref 15–37)
AST SERPL-CCNC: 27 U/L — SIGNIFICANT CHANGE UP (ref 15–37)
BACTERIA # UR AUTO: ABNORMAL
BASE EXCESS BLDA CALC-SCNC: -6.1 MMOL/L — LOW (ref -2–2)
BASOPHILS # BLD AUTO: 0.02 K/UL — SIGNIFICANT CHANGE UP (ref 0–0.2)
BASOPHILS NFR BLD AUTO: 0.2 % — SIGNIFICANT CHANGE UP (ref 0–2)
BILIRUB SERPL-MCNC: 0.4 MG/DL — SIGNIFICANT CHANGE UP (ref 0.2–1.2)
BILIRUB SERPL-MCNC: 0.4 MG/DL — SIGNIFICANT CHANGE UP (ref 0.2–1.2)
BILIRUB UR-MCNC: NEGATIVE — SIGNIFICANT CHANGE UP
BLOOD GAS COMMENTS: SIGNIFICANT CHANGE UP
BLOOD GAS COMMENTS: SIGNIFICANT CHANGE UP
BLOOD GAS SOURCE: SIGNIFICANT CHANGE UP
BUN SERPL-MCNC: 64 MG/DL — HIGH (ref 7–23)
BUN SERPL-MCNC: 64 MG/DL — HIGH (ref 7–23)
CALCIUM SERPL-MCNC: 9.5 MG/DL — SIGNIFICANT CHANGE UP (ref 8.5–10.1)
CALCIUM SERPL-MCNC: 9.6 MG/DL — SIGNIFICANT CHANGE UP (ref 8.5–10.1)
CHLORIDE SERPL-SCNC: 104 MMOL/L — SIGNIFICANT CHANGE UP (ref 96–108)
CHLORIDE SERPL-SCNC: 94 MMOL/L — LOW (ref 96–108)
CO2 SERPL-SCNC: 19 MMOL/L — LOW (ref 22–31)
CO2 SERPL-SCNC: 19 MMOL/L — LOW (ref 22–31)
COLOR SPEC: YELLOW — SIGNIFICANT CHANGE UP
COMMENT - URINE: SIGNIFICANT CHANGE UP
CREAT SERPL-MCNC: 3.39 MG/DL — HIGH (ref 0.5–1.3)
CREAT SERPL-MCNC: 3.71 MG/DL — HIGH (ref 0.5–1.3)
DIFF PNL FLD: ABNORMAL
EOSINOPHIL # BLD AUTO: 0.05 K/UL — SIGNIFICANT CHANGE UP (ref 0–0.5)
EOSINOPHIL NFR BLD AUTO: 0.5 % — SIGNIFICANT CHANGE UP (ref 0–6)
EPI CELLS # UR: ABNORMAL
GLUCOSE BLDC GLUCOMTR-MCNC: 541 MG/DL — CRITICAL HIGH (ref 70–99)
GLUCOSE BLDC GLUCOMTR-MCNC: >600 MG/DL — CRITICAL HIGH (ref 70–99)
GLUCOSE SERPL-MCNC: 416 MG/DL — HIGH (ref 70–99)
GLUCOSE SERPL-MCNC: 773 MG/DL — CRITICAL HIGH (ref 70–99)
GLUCOSE UR QL: 1000 MG/DL
HCO3 BLDA-SCNC: 18 MMOL/L — LOW (ref 21–29)
HCT VFR BLD CALC: 31.4 % — LOW (ref 39–50)
HGB BLD-MCNC: 11.2 G/DL — LOW (ref 13–17)
HOROWITZ INDEX BLDA+IHG-RTO: 21 — SIGNIFICANT CHANGE UP
IMM GRANULOCYTES NFR BLD AUTO: 0.8 % — SIGNIFICANT CHANGE UP (ref 0–1.5)
INR BLD: 1.08 RATIO — SIGNIFICANT CHANGE UP (ref 0.88–1.16)
KETONES UR-MCNC: NEGATIVE — SIGNIFICANT CHANGE UP
LACTATE SERPL-SCNC: 2.9 MMOL/L — HIGH (ref 0.7–2)
LACTATE SERPL-SCNC: 3.1 MMOL/L — HIGH (ref 0.7–2)
LEUKOCYTE ESTERASE UR-ACNC: ABNORMAL
LYMPHOCYTES # BLD AUTO: 0.87 K/UL — LOW (ref 1–3.3)
LYMPHOCYTES # BLD AUTO: 8.9 % — LOW (ref 13–44)
MCHC RBC-ENTMCNC: 30.9 PG — SIGNIFICANT CHANGE UP (ref 27–34)
MCHC RBC-ENTMCNC: 35.7 GM/DL — SIGNIFICANT CHANGE UP (ref 32–36)
MCV RBC AUTO: 86.7 FL — SIGNIFICANT CHANGE UP (ref 80–100)
MONOCYTES # BLD AUTO: 0.78 K/UL — SIGNIFICANT CHANGE UP (ref 0–0.9)
MONOCYTES NFR BLD AUTO: 8 % — SIGNIFICANT CHANGE UP (ref 2–14)
NEUTROPHILS # BLD AUTO: 7.98 K/UL — HIGH (ref 1.8–7.4)
NEUTROPHILS NFR BLD AUTO: 81.6 % — HIGH (ref 43–77)
NITRITE UR-MCNC: NEGATIVE — SIGNIFICANT CHANGE UP
NRBC # BLD: 0 /100 WBCS — SIGNIFICANT CHANGE UP (ref 0–0)
PCO2 BLDA: 34 MMHG — SIGNIFICANT CHANGE UP (ref 32–46)
PH BLD: 7.35 — SIGNIFICANT CHANGE UP (ref 7.35–7.45)
PH UR: 5 — SIGNIFICANT CHANGE UP (ref 5–8)
PLATELET # BLD AUTO: 375 K/UL — SIGNIFICANT CHANGE UP (ref 150–400)
PO2 BLDA: 101 MMHG — SIGNIFICANT CHANGE UP (ref 74–108)
POTASSIUM SERPL-MCNC: 5.2 MMOL/L — SIGNIFICANT CHANGE UP (ref 3.5–5.3)
POTASSIUM SERPL-MCNC: 6.3 MMOL/L — CRITICAL HIGH (ref 3.5–5.3)
POTASSIUM SERPL-SCNC: 5.2 MMOL/L — SIGNIFICANT CHANGE UP (ref 3.5–5.3)
POTASSIUM SERPL-SCNC: 6.3 MMOL/L — CRITICAL HIGH (ref 3.5–5.3)
PROT SERPL-MCNC: 8.8 GM/DL — HIGH (ref 6–8.3)
PROT SERPL-MCNC: 9.3 GM/DL — HIGH (ref 6–8.3)
PROT UR-MCNC: 15 MG/DL
PROTHROM AB SERPL-ACNC: 12.1 SEC — SIGNIFICANT CHANGE UP (ref 10–12.9)
RBC # BLD: 3.62 M/UL — LOW (ref 4.2–5.8)
RBC # FLD: 13.3 % — SIGNIFICANT CHANGE UP (ref 10.3–14.5)
RBC CASTS # UR COMP ASSIST: ABNORMAL /HPF (ref 0–4)
SAO2 % BLDA: 97 % — HIGH (ref 92–96)
SODIUM SERPL-SCNC: 125 MMOL/L — LOW (ref 135–145)
SODIUM SERPL-SCNC: 134 MMOL/L — LOW (ref 135–145)
SP GR SPEC: 1.01 — SIGNIFICANT CHANGE UP (ref 1.01–1.02)
UROBILINOGEN FLD QL: NEGATIVE MG/DL — SIGNIFICANT CHANGE UP
WBC # BLD: 9.78 K/UL — SIGNIFICANT CHANGE UP (ref 3.8–10.5)
WBC # FLD AUTO: 9.78 K/UL — SIGNIFICANT CHANGE UP (ref 3.8–10.5)
WBC UR QL: SIGNIFICANT CHANGE UP

## 2019-02-22 PROCEDURE — 99285 EMERGENCY DEPT VISIT HI MDM: CPT

## 2019-02-22 PROCEDURE — 70450 CT HEAD/BRAIN W/O DYE: CPT | Mod: 26

## 2019-02-22 PROCEDURE — 73620 X-RAY EXAM OF FOOT: CPT | Mod: 26,RT

## 2019-02-22 PROCEDURE — 71045 X-RAY EXAM CHEST 1 VIEW: CPT | Mod: 26

## 2019-02-22 PROCEDURE — 73630 X-RAY EXAM OF FOOT: CPT | Mod: 26,RT

## 2019-02-22 RX ORDER — CIPROFLOXACIN LACTATE 400MG/40ML
400 VIAL (ML) INTRAVENOUS ONCE
Qty: 0 | Refills: 0 | Status: COMPLETED | OUTPATIENT
Start: 2019-02-22 | End: 2019-02-22

## 2019-02-22 RX ORDER — SODIUM CHLORIDE 9 MG/ML
1000 INJECTION INTRAMUSCULAR; INTRAVENOUS; SUBCUTANEOUS ONCE
Qty: 0 | Refills: 0 | Status: COMPLETED | OUTPATIENT
Start: 2019-02-22 | End: 2019-02-22

## 2019-02-22 RX ORDER — INSULIN HUMAN 100 [IU]/ML
10 INJECTION, SOLUTION SUBCUTANEOUS ONCE
Qty: 0 | Refills: 0 | Status: COMPLETED | OUTPATIENT
Start: 2019-02-22 | End: 2019-02-22

## 2019-02-22 RX ORDER — CALCIUM GLUCONATE 100 MG/ML
2 VIAL (ML) INTRAVENOUS ONCE
Qty: 0 | Refills: 0 | Status: COMPLETED | OUTPATIENT
Start: 2019-02-22 | End: 2019-02-22

## 2019-02-22 RX ADMIN — SODIUM CHLORIDE 1000 MILLILITER(S): 9 INJECTION INTRAMUSCULAR; INTRAVENOUS; SUBCUTANEOUS at 23:25

## 2019-02-22 RX ADMIN — SODIUM CHLORIDE 1000 MILLILITER(S): 9 INJECTION INTRAMUSCULAR; INTRAVENOUS; SUBCUTANEOUS at 18:37

## 2019-02-22 RX ADMIN — Medication 200 GRAM(S): at 23:25

## 2019-02-22 RX ADMIN — INSULIN HUMAN 10 UNIT(S): 100 INJECTION, SOLUTION SUBCUTANEOUS at 20:55

## 2019-02-22 RX ADMIN — SODIUM CHLORIDE 1000 MILLILITER(S): 9 INJECTION INTRAMUSCULAR; INTRAVENOUS; SUBCUTANEOUS at 20:56

## 2019-02-22 RX ADMIN — SODIUM CHLORIDE 1000 MILLILITER(S): 9 INJECTION INTRAMUSCULAR; INTRAVENOUS; SUBCUTANEOUS at 20:09

## 2019-02-22 RX ADMIN — SODIUM CHLORIDE 1000 MILLILITER(S): 9 INJECTION INTRAMUSCULAR; INTRAVENOUS; SUBCUTANEOUS at 22:00

## 2019-02-22 RX ADMIN — INSULIN HUMAN 10 UNIT(S): 100 INJECTION, SOLUTION SUBCUTANEOUS at 23:25

## 2019-02-22 NOTE — H&P ADULT - HISTORY OF PRESENT ILLNESS
77 yo M PMHx HTN, DM, R foot ulcer, HLD presents to ED with daughter for evaluation of AMS x this AM. As per daughter, pt has been less alert since this morning, lethargic and did not recognize her.

## 2019-02-22 NOTE — ED PROVIDER NOTE - OBJECTIVE STATEMENT
77 yo M PMHx HTN, DM, R foot ulcer, HLD presents to ED with daughter for evaluation of AMS x this AM. As per daughter, pt has been less alert since this morning, lethargic and did not recognize her.  Daughter also reports recent hx of multiple near syncopal episodes. 79 yo M PMHx HTN, DM, R foot ulcer, HLD presents to ED with daughter for evaluation of AMS x this AM. As per daughter, pt has been less alert since this morning, lethargic and did not recognize her.  Daughter also reports recent hx of multiple ?near syncopal episodes. Daughter states that pt starts drooling, slumps over to the R side and unresponsive for a few minutes. Last episode was yesterday. Pt was evaluated   fell asleep in sitting position, family member could not wake him up for about 10 minutes.  Pt. was drooling and slumped to R side.  He notes this has been happening on a weekly basis.  He feels forgetful, but has no other specific complaints.  He feels baseline, acting baseline as per family member at bedside.  No other complaints, no recent trauma. 77 yo M PMHx HTN, DM, R foot ulcer, HLD presents to ED with daughter for evaluation of AMS x this AM. As per daughter, pt has been less alert since this morning, lethargic and did not recognize her. Daughter denies fall, head trauma.  Patient has no complaints.  Daughter also reports recent hx of multiple ?near syncopal episodes. Daughter states that pt starts drooling, slumps over to the R side and unresponsive for a few minutes. Last episode was yesterday. Pt was evaluated for this issue about 3 weeks ago in the ED. As per daughter, pt has appt for MRI next week.

## 2019-02-22 NOTE — H&P ADULT - ASSESSMENT
79 yo M PMHx HTN, DM, R foot ulcer, HLD presents to ED with daughter for evaluation of AMS x this AM. As per daughter, pt has been less alert since this morning, lethargic and did not recognize her.

## 2019-02-22 NOTE — H&P ADULT - NSHPLABSRESULTS_GEN_ALL_CORE
9.8    9.72  )-----------( 375      ( 2019 09:07 )             27.7         134<L>  |  104  |  64<H>  ----------------------------<  416<H>  5.2   |  19<L>  |  3.39<H>    Ca    9.6      2019 23:26    TPro  8.8<H>  /  Alb  3.0<L>  /  TBili  0.4  /  DBili  x   /  AST  23  /  ALT  40  /  AlkPhos  118  -    PT/INR - ( 2019 18:59 )   PT: 12.1 sec;   INR: 1.08 ratio         PTT - ( 2019 18:59 )  PTT:24.1 sec  Urinalysis Basic - ( 2019 22:45 )    Color: Yellow / Appearance: Clear / S.015 / pH: x  Gluc: x / Ketone: Negative  / Bili: Negative / Urobili: Negative mg/dL   Blood: x / Protein: 15 mg/dL / Nitrite: Negative   Leuk Esterase: Moderate / RBC: 6-10 /HPF / WBC 3-5   Sq Epi: x / Non Sq Epi: Moderate / Bacteria: Moderate

## 2019-02-22 NOTE — ED PROVIDER NOTE - ATTENDING CONTRIBUTION TO CARE
Patient with ams from home. Patient with similar complaints 3 weeks ago. Blood glucose found to be "high" in ED.    PE:  non toxic,  CHEST: RRR  ABD: soft, NT, ND    IMP: r/o dka, r/o cellulitits  labs, xrays pending

## 2019-02-22 NOTE — ED PROVIDER NOTE - PROGRESS NOTE DETAILS
Elmer: Patient signed out by Dr. Wallace pending labs and ct. All reviewed. patient given ivfs, insulin, potassium and abx. Patient now admitted to medicine for further management.

## 2019-02-22 NOTE — ED ADULT TRIAGE NOTE - CHIEF COMPLAINT QUOTE
as per daughter " my dad has been confused, weak, and not being himself. pt complained of right heel."  hx

## 2019-02-22 NOTE — ED ADULT NURSE NOTE - OBJECTIVE STATEMENT
78 y.o male with a hx of dementia and diabetes and foot wound was brought in by wife due to increasing confusion. patient's wife stated that he didn't recognize her today. Pt is lethargic and confused.

## 2019-02-23 DIAGNOSIS — N39.0 URINARY TRACT INFECTION, SITE NOT SPECIFIED: ICD-10-CM

## 2019-02-23 DIAGNOSIS — N17.9 ACUTE KIDNEY FAILURE, UNSPECIFIED: ICD-10-CM

## 2019-02-23 DIAGNOSIS — R73.9 HYPERGLYCEMIA, UNSPECIFIED: ICD-10-CM

## 2019-02-23 DIAGNOSIS — E11.22 TYPE 2 DIABETES MELLITUS WITH DIABETIC CHRONIC KIDNEY DISEASE: ICD-10-CM

## 2019-02-23 DIAGNOSIS — G93.41 METABOLIC ENCEPHALOPATHY: ICD-10-CM

## 2019-02-23 DIAGNOSIS — L97.509 NON-PRESSURE CHRONIC ULCER OF OTHER PART OF UNSPECIFIED FOOT WITH UNSPECIFIED SEVERITY: ICD-10-CM

## 2019-02-23 LAB
ANION GAP SERPL CALC-SCNC: 11 MMOL/L — SIGNIFICANT CHANGE UP (ref 5–17)
BUN SERPL-MCNC: 56 MG/DL — HIGH (ref 7–23)
CALCIUM SERPL-MCNC: 9.6 MG/DL — SIGNIFICANT CHANGE UP (ref 8.5–10.1)
CHLORIDE SERPL-SCNC: 107 MMOL/L — SIGNIFICANT CHANGE UP (ref 96–108)
CO2 SERPL-SCNC: 20 MMOL/L — LOW (ref 22–31)
CREAT SERPL-MCNC: 2.8 MG/DL — HIGH (ref 0.5–1.3)
GLUCOSE BLDC GLUCOMTR-MCNC: 131 MG/DL — HIGH (ref 70–99)
GLUCOSE BLDC GLUCOMTR-MCNC: 148 MG/DL — HIGH (ref 70–99)
GLUCOSE BLDC GLUCOMTR-MCNC: 232 MG/DL — HIGH (ref 70–99)
GLUCOSE BLDC GLUCOMTR-MCNC: 263 MG/DL — HIGH (ref 70–99)
GLUCOSE BLDC GLUCOMTR-MCNC: 343 MG/DL — HIGH (ref 70–99)
GLUCOSE SERPL-MCNC: 266 MG/DL — HIGH (ref 70–99)
HCT VFR BLD CALC: 27.7 % — LOW (ref 39–50)
HGB BLD-MCNC: 9.8 G/DL — LOW (ref 13–17)
LACTATE SERPL-SCNC: 2.4 MMOL/L — HIGH (ref 0.7–2)
MCHC RBC-ENTMCNC: 30.6 PG — SIGNIFICANT CHANGE UP (ref 27–34)
MCHC RBC-ENTMCNC: 35.4 GM/DL — SIGNIFICANT CHANGE UP (ref 32–36)
MCV RBC AUTO: 86.6 FL — SIGNIFICANT CHANGE UP (ref 80–100)
NRBC # BLD: 0 /100 WBCS — SIGNIFICANT CHANGE UP (ref 0–0)
PLATELET # BLD AUTO: 375 K/UL — SIGNIFICANT CHANGE UP (ref 150–400)
POTASSIUM SERPL-MCNC: 5.4 MMOL/L — HIGH (ref 3.5–5.3)
POTASSIUM SERPL-SCNC: 5.4 MMOL/L — HIGH (ref 3.5–5.3)
RBC # BLD: 3.2 M/UL — LOW (ref 4.2–5.8)
RBC # FLD: 13.3 % — SIGNIFICANT CHANGE UP (ref 10.3–14.5)
SODIUM SERPL-SCNC: 138 MMOL/L — SIGNIFICANT CHANGE UP (ref 135–145)
WBC # BLD: 9.72 K/UL — SIGNIFICANT CHANGE UP (ref 3.8–10.5)
WBC # FLD AUTO: 9.72 K/UL — SIGNIFICANT CHANGE UP (ref 3.8–10.5)

## 2019-02-23 RX ORDER — DONEPEZIL HYDROCHLORIDE 10 MG/1
10 TABLET, FILM COATED ORAL AT BEDTIME
Qty: 0 | Refills: 0 | Status: DISCONTINUED | OUTPATIENT
Start: 2019-02-23 | End: 2019-03-07

## 2019-02-23 RX ORDER — SODIUM CHLORIDE 9 MG/ML
1000 INJECTION, SOLUTION INTRAVENOUS
Qty: 0 | Refills: 0 | Status: DISCONTINUED | OUTPATIENT
Start: 2019-02-23 | End: 2019-03-07

## 2019-02-23 RX ORDER — DEXTROSE 50 % IN WATER 50 %
15 SYRINGE (ML) INTRAVENOUS ONCE
Qty: 0 | Refills: 0 | Status: DISCONTINUED | OUTPATIENT
Start: 2019-02-23 | End: 2019-03-07

## 2019-02-23 RX ORDER — DEXTROSE 50 % IN WATER 50 %
25 SYRINGE (ML) INTRAVENOUS ONCE
Qty: 0 | Refills: 0 | Status: DISCONTINUED | OUTPATIENT
Start: 2019-02-23 | End: 2019-03-07

## 2019-02-23 RX ORDER — DEXTROSE 50 % IN WATER 50 %
12.5 SYRINGE (ML) INTRAVENOUS ONCE
Qty: 0 | Refills: 0 | Status: DISCONTINUED | OUTPATIENT
Start: 2019-02-23 | End: 2019-03-07

## 2019-02-23 RX ORDER — HEPARIN SODIUM 5000 [USP'U]/ML
5000 INJECTION INTRAVENOUS; SUBCUTANEOUS EVERY 12 HOURS
Qty: 0 | Refills: 0 | Status: DISCONTINUED | OUTPATIENT
Start: 2019-02-23 | End: 2019-03-07

## 2019-02-23 RX ORDER — INFLUENZA VIRUS VACCINE 15; 15; 15; 15 UG/.5ML; UG/.5ML; UG/.5ML; UG/.5ML
0.5 SUSPENSION INTRAMUSCULAR ONCE
Qty: 0 | Refills: 0 | Status: DISCONTINUED | OUTPATIENT
Start: 2019-02-23 | End: 2019-03-07

## 2019-02-23 RX ORDER — CIPROFLOXACIN LACTATE 400MG/40ML
200 VIAL (ML) INTRAVENOUS EVERY 12 HOURS
Qty: 0 | Refills: 0 | Status: DISCONTINUED | OUTPATIENT
Start: 2019-02-23 | End: 2019-03-03

## 2019-02-23 RX ORDER — INSULIN LISPRO 100/ML
VIAL (ML) SUBCUTANEOUS
Qty: 0 | Refills: 0 | Status: DISCONTINUED | OUTPATIENT
Start: 2019-02-23 | End: 2019-03-07

## 2019-02-23 RX ORDER — TAMSULOSIN HYDROCHLORIDE 0.4 MG/1
0.4 CAPSULE ORAL AT BEDTIME
Qty: 0 | Refills: 0 | Status: DISCONTINUED | OUTPATIENT
Start: 2019-02-23 | End: 2019-03-07

## 2019-02-23 RX ORDER — INSULIN GLARGINE 100 [IU]/ML
10 INJECTION, SOLUTION SUBCUTANEOUS EVERY MORNING
Qty: 0 | Refills: 0 | Status: DISCONTINUED | OUTPATIENT
Start: 2019-02-23 | End: 2019-03-07

## 2019-02-23 RX ORDER — GLUCAGON INJECTION, SOLUTION 0.5 MG/.1ML
1 INJECTION, SOLUTION SUBCUTANEOUS ONCE
Qty: 0 | Refills: 0 | Status: DISCONTINUED | OUTPATIENT
Start: 2019-02-23 | End: 2019-03-07

## 2019-02-23 RX ORDER — INSULIN GLARGINE 100 [IU]/ML
10 INJECTION, SOLUTION SUBCUTANEOUS AT BEDTIME
Qty: 0 | Refills: 0 | Status: DISCONTINUED | OUTPATIENT
Start: 2019-02-23 | End: 2019-03-05

## 2019-02-23 RX ADMIN — SODIUM CHLORIDE 1000 MILLILITER(S): 9 INJECTION INTRAMUSCULAR; INTRAVENOUS; SUBCUTANEOUS at 00:02

## 2019-02-23 RX ADMIN — Medication 2 GRAM(S): at 00:02

## 2019-02-23 RX ADMIN — SODIUM CHLORIDE 100 MILLILITER(S): 9 INJECTION, SOLUTION INTRAVENOUS at 03:13

## 2019-02-23 RX ADMIN — INSULIN GLARGINE 10 UNIT(S): 100 INJECTION, SOLUTION SUBCUTANEOUS at 22:19

## 2019-02-23 RX ADMIN — Medication 100 MILLIGRAM(S): at 17:39

## 2019-02-23 RX ADMIN — TAMSULOSIN HYDROCHLORIDE 0.4 MILLIGRAM(S): 0.4 CAPSULE ORAL at 22:19

## 2019-02-23 RX ADMIN — SODIUM CHLORIDE 100 MILLILITER(S): 9 INJECTION, SOLUTION INTRAVENOUS at 22:20

## 2019-02-23 RX ADMIN — INSULIN GLARGINE 10 UNIT(S): 100 INJECTION, SOLUTION SUBCUTANEOUS at 02:12

## 2019-02-23 RX ADMIN — HEPARIN SODIUM 5000 UNIT(S): 5000 INJECTION INTRAVENOUS; SUBCUTANEOUS at 17:38

## 2019-02-23 RX ADMIN — Medication 400 MILLIGRAM(S): at 01:05

## 2019-02-23 RX ADMIN — INSULIN GLARGINE 10 UNIT(S): 100 INJECTION, SOLUTION SUBCUTANEOUS at 08:43

## 2019-02-23 RX ADMIN — Medication 200 MILLIGRAM(S): at 00:05

## 2019-02-23 RX ADMIN — DONEPEZIL HYDROCHLORIDE 10 MILLIGRAM(S): 10 TABLET, FILM COATED ORAL at 22:19

## 2019-02-23 RX ADMIN — SODIUM CHLORIDE 100 MILLILITER(S): 9 INJECTION, SOLUTION INTRAVENOUS at 17:41

## 2019-02-23 RX ADMIN — Medication 6: at 08:43

## 2019-02-24 LAB
ALBUMIN SERPL ELPH-MCNC: 2.4 G/DL — LOW (ref 3.3–5)
ALP SERPL-CCNC: 95 U/L — SIGNIFICANT CHANGE UP (ref 40–120)
ALT FLD-CCNC: 30 U/L — SIGNIFICANT CHANGE UP (ref 12–78)
ANION GAP SERPL CALC-SCNC: 7 MMOL/L — SIGNIFICANT CHANGE UP (ref 5–17)
AST SERPL-CCNC: 34 U/L — SIGNIFICANT CHANGE UP (ref 15–37)
BILIRUB SERPL-MCNC: 0.4 MG/DL — SIGNIFICANT CHANGE UP (ref 0.2–1.2)
BUN SERPL-MCNC: 34 MG/DL — HIGH (ref 7–23)
CALCIUM SERPL-MCNC: 9.5 MG/DL — SIGNIFICANT CHANGE UP (ref 8.5–10.1)
CHLORIDE SERPL-SCNC: 108 MMOL/L — SIGNIFICANT CHANGE UP (ref 96–108)
CO2 SERPL-SCNC: 23 MMOL/L — SIGNIFICANT CHANGE UP (ref 22–31)
CREAT SERPL-MCNC: 1.95 MG/DL — HIGH (ref 0.5–1.3)
GLUCOSE BLDC GLUCOMTR-MCNC: 190 MG/DL — HIGH (ref 70–99)
GLUCOSE BLDC GLUCOMTR-MCNC: 214 MG/DL — HIGH (ref 70–99)
GLUCOSE BLDC GLUCOMTR-MCNC: 223 MG/DL — HIGH (ref 70–99)
GLUCOSE BLDC GLUCOMTR-MCNC: 253 MG/DL — HIGH (ref 70–99)
GLUCOSE BLDC GLUCOMTR-MCNC: 295 MG/DL — HIGH (ref 70–99)
GLUCOSE BLDC GLUCOMTR-MCNC: 322 MG/DL — HIGH (ref 70–99)
GLUCOSE SERPL-MCNC: 205 MG/DL — HIGH (ref 70–99)
HBA1C BLD-MCNC: 12.5 % — HIGH (ref 4–5.6)
HCT VFR BLD CALC: 26.9 % — LOW (ref 39–50)
HGB BLD-MCNC: 9.7 G/DL — LOW (ref 13–17)
MCHC RBC-ENTMCNC: 31.3 PG — SIGNIFICANT CHANGE UP (ref 27–34)
MCHC RBC-ENTMCNC: 36.1 GM/DL — HIGH (ref 32–36)
MCV RBC AUTO: 86.8 FL — SIGNIFICANT CHANGE UP (ref 80–100)
NRBC # BLD: 0 /100 WBCS — SIGNIFICANT CHANGE UP (ref 0–0)
PLATELET # BLD AUTO: 356 K/UL — SIGNIFICANT CHANGE UP (ref 150–400)
POTASSIUM SERPL-MCNC: 5.1 MMOL/L — SIGNIFICANT CHANGE UP (ref 3.5–5.3)
POTASSIUM SERPL-SCNC: 5.1 MMOL/L — SIGNIFICANT CHANGE UP (ref 3.5–5.3)
PROT SERPL-MCNC: 7.8 GM/DL — SIGNIFICANT CHANGE UP (ref 6–8.3)
RBC # BLD: 3.1 M/UL — LOW (ref 4.2–5.8)
RBC # FLD: 13.4 % — SIGNIFICANT CHANGE UP (ref 10.3–14.5)
SODIUM SERPL-SCNC: 138 MMOL/L — SIGNIFICANT CHANGE UP (ref 135–145)
WBC # BLD: 8.15 K/UL — SIGNIFICANT CHANGE UP (ref 3.8–10.5)
WBC # FLD AUTO: 8.15 K/UL — SIGNIFICANT CHANGE UP (ref 3.8–10.5)

## 2019-02-24 RX ADMIN — DONEPEZIL HYDROCHLORIDE 10 MILLIGRAM(S): 10 TABLET, FILM COATED ORAL at 22:25

## 2019-02-24 RX ADMIN — Medication 100 MILLIGRAM(S): at 05:20

## 2019-02-24 RX ADMIN — INSULIN GLARGINE 10 UNIT(S): 100 INJECTION, SOLUTION SUBCUTANEOUS at 10:22

## 2019-02-24 RX ADMIN — Medication 100 MILLIGRAM(S): at 17:54

## 2019-02-24 RX ADMIN — Medication 2: at 17:53

## 2019-02-24 RX ADMIN — TAMSULOSIN HYDROCHLORIDE 0.4 MILLIGRAM(S): 0.4 CAPSULE ORAL at 22:25

## 2019-02-24 RX ADMIN — Medication 8: at 11:49

## 2019-02-24 RX ADMIN — HEPARIN SODIUM 5000 UNIT(S): 5000 INJECTION INTRAVENOUS; SUBCUTANEOUS at 05:19

## 2019-02-24 RX ADMIN — HEPARIN SODIUM 5000 UNIT(S): 5000 INJECTION INTRAVENOUS; SUBCUTANEOUS at 17:54

## 2019-02-24 RX ADMIN — Medication 6: at 10:23

## 2019-02-24 RX ADMIN — INSULIN GLARGINE 10 UNIT(S): 100 INJECTION, SOLUTION SUBCUTANEOUS at 22:24

## 2019-02-25 LAB
ANION GAP SERPL CALC-SCNC: 11 MMOL/L — SIGNIFICANT CHANGE UP (ref 5–17)
BUN SERPL-MCNC: 25 MG/DL — HIGH (ref 7–23)
CALCIUM SERPL-MCNC: 9.2 MG/DL — SIGNIFICANT CHANGE UP (ref 8.5–10.1)
CHLORIDE SERPL-SCNC: 107 MMOL/L — SIGNIFICANT CHANGE UP (ref 96–108)
CO2 SERPL-SCNC: 22 MMOL/L — SIGNIFICANT CHANGE UP (ref 22–31)
CREAT SERPL-MCNC: 1.6 MG/DL — HIGH (ref 0.5–1.3)
GLUCOSE BLDC GLUCOMTR-MCNC: 232 MG/DL — HIGH (ref 70–99)
GLUCOSE BLDC GLUCOMTR-MCNC: 238 MG/DL — HIGH (ref 70–99)
GLUCOSE BLDC GLUCOMTR-MCNC: 243 MG/DL — HIGH (ref 70–99)
GLUCOSE BLDC GLUCOMTR-MCNC: 267 MG/DL — HIGH (ref 70–99)
GLUCOSE SERPL-MCNC: 204 MG/DL — HIGH (ref 70–99)
HCT VFR BLD CALC: 28.4 % — LOW (ref 39–50)
HGB BLD-MCNC: 9.9 G/DL — LOW (ref 13–17)
MCHC RBC-ENTMCNC: 30.7 PG — SIGNIFICANT CHANGE UP (ref 27–34)
MCHC RBC-ENTMCNC: 34.9 GM/DL — SIGNIFICANT CHANGE UP (ref 32–36)
MCV RBC AUTO: 88.2 FL — SIGNIFICANT CHANGE UP (ref 80–100)
NRBC # BLD: 0 /100 WBCS — SIGNIFICANT CHANGE UP (ref 0–0)
PLATELET # BLD AUTO: 367 K/UL — SIGNIFICANT CHANGE UP (ref 150–400)
POTASSIUM SERPL-MCNC: 4.5 MMOL/L — SIGNIFICANT CHANGE UP (ref 3.5–5.3)
POTASSIUM SERPL-SCNC: 4.5 MMOL/L — SIGNIFICANT CHANGE UP (ref 3.5–5.3)
RBC # BLD: 3.22 M/UL — LOW (ref 4.2–5.8)
RBC # FLD: 13.3 % — SIGNIFICANT CHANGE UP (ref 10.3–14.5)
SODIUM SERPL-SCNC: 140 MMOL/L — SIGNIFICANT CHANGE UP (ref 135–145)
WBC # BLD: 7.38 K/UL — SIGNIFICANT CHANGE UP (ref 3.8–10.5)
WBC # FLD AUTO: 7.38 K/UL — SIGNIFICANT CHANGE UP (ref 3.8–10.5)

## 2019-02-25 RX ADMIN — HEPARIN SODIUM 5000 UNIT(S): 5000 INJECTION INTRAVENOUS; SUBCUTANEOUS at 17:20

## 2019-02-25 RX ADMIN — INSULIN GLARGINE 10 UNIT(S): 100 INJECTION, SOLUTION SUBCUTANEOUS at 21:54

## 2019-02-25 RX ADMIN — Medication 100 MILLIGRAM(S): at 17:19

## 2019-02-25 RX ADMIN — DONEPEZIL HYDROCHLORIDE 10 MILLIGRAM(S): 10 TABLET, FILM COATED ORAL at 21:55

## 2019-02-25 RX ADMIN — Medication 4: at 08:00

## 2019-02-25 RX ADMIN — Medication 100 MILLIGRAM(S): at 05:13

## 2019-02-25 RX ADMIN — Medication 4: at 12:24

## 2019-02-25 RX ADMIN — INSULIN GLARGINE 10 UNIT(S): 100 INJECTION, SOLUTION SUBCUTANEOUS at 08:00

## 2019-02-25 RX ADMIN — TAMSULOSIN HYDROCHLORIDE 0.4 MILLIGRAM(S): 0.4 CAPSULE ORAL at 21:55

## 2019-02-25 RX ADMIN — HEPARIN SODIUM 5000 UNIT(S): 5000 INJECTION INTRAVENOUS; SUBCUTANEOUS at 05:13

## 2019-02-25 RX ADMIN — Medication 6: at 17:19

## 2019-02-25 NOTE — PHYSICAL THERAPY INITIAL EVALUATION ADULT - ACTIVE RANGE OF MOTION EXAMINATION, REHAB EVAL
sherry. upper extremity Active ROM was WNL (within normal limits)/bilateral lower extremity Active ROM was WNL (within normal limits)

## 2019-02-25 NOTE — SWALLOW BEDSIDE ASSESSMENT ADULT - COMMENTS
CT Head 2/22/2019 IMPRESSION: The frontal calvarium and anterior aspect of the frontal lobes are partially excluded from the field-of-view.  Otherwise there is no CT evidence of acute intracranial hemorrhage, mass effect or acute territorial infarct.  Moderate cerebral volume loss and chronic microvascular ischemic disease are stable since 02/01/2019    Xray chest 2/22/2019 Impression: Heart unremarkable. Lungs clear. Bones unremarkable.

## 2019-02-25 NOTE — SWALLOW BEDSIDE ASSESSMENT ADULT - H & P REVIEW
yes/79 yo M PMHx HTN, DM, R foot ulcer, HLD presents to ED with daughter for evaluation of AMS x this AM. As per daughter, pt has been less alert since this morning, lethargic and did not recognize her.

## 2019-02-25 NOTE — PHYSICAL THERAPY INITIAL EVALUATION ADULT - TRANSFER SAFETY CONCERNS NOTED: SIT/STAND, REHAB EVAL
decreased safety awareness/decreased step length/decreased balance during turns/decreased sequencing ability

## 2019-02-25 NOTE — SWALLOW BEDSIDE ASSESSMENT ADULT - PHARYNGEAL PHASE
Decreased laryngeal elevation/Delayed pharyngeal swallow Delayed pharyngeal swallow/Decreased laryngeal elevation Cough post oral intake/Delayed pharyngeal swallow/Decreased laryngeal elevation/Throat clear post oral intake

## 2019-02-25 NOTE — SWALLOW BEDSIDE ASSESSMENT ADULT - SLP GENERAL OBSERVATIONS
pt seen sitting in chair, alert and oriented x2. noted baseline cough and throat clearing ?sec to GERD when he began to phonate. pt followed one step directions and answered autobiographical/want questions. speech intelligibility fair to good and vocal quality WNL. Braintrejessica 2b-3, pt seen sitting in chair, alert and oriented x2. noted baseline cough and throat clearing ?sec to GERD when he began to phonate. pt followed one step directions and answered autobiographical/want questions. speech intelligibility fair to good and vocal quality WNL.

## 2019-02-25 NOTE — SWALLOW BEDSIDE ASSESSMENT ADULT - SWALLOW EVAL: DIAGNOSIS
pt presented with oropharyngeal dysphagia marked by decreased mastication ability sec to incomplete dentition, suspect delay in pharyngeal swallow with decreased laryngeal elevation. noted signs of aspiration with thin liquids pt presented with oropharyngeal dysphagia marked by decreased mastication ability sec to incomplete dentition, suspect delay in pharyngeal swallow with decreased laryngeal elevation. noted cough with thin liquids

## 2019-02-25 NOTE — PHYSICAL THERAPY INITIAL EVALUATION ADULT - GAIT DEVIATIONS NOTED, PT EVAL
decreased step length/decreased stride length/increased time in double stance/decreased rivera/increased stride width

## 2019-02-26 LAB
GLUCOSE BLDC GLUCOMTR-MCNC: 183 MG/DL — HIGH (ref 70–99)
GLUCOSE BLDC GLUCOMTR-MCNC: 186 MG/DL — HIGH (ref 70–99)
GLUCOSE BLDC GLUCOMTR-MCNC: 190 MG/DL — HIGH (ref 70–99)
GLUCOSE BLDC GLUCOMTR-MCNC: 257 MG/DL — HIGH (ref 70–99)

## 2019-02-26 RX ORDER — INSULIN LISPRO 100/ML
5 VIAL (ML) SUBCUTANEOUS
Qty: 0 | Refills: 0 | Status: DISCONTINUED | OUTPATIENT
Start: 2019-02-26 | End: 2019-03-05

## 2019-02-26 RX ADMIN — Medication 5 UNIT(S): at 16:50

## 2019-02-26 RX ADMIN — HEPARIN SODIUM 5000 UNIT(S): 5000 INJECTION INTRAVENOUS; SUBCUTANEOUS at 17:29

## 2019-02-26 RX ADMIN — Medication 100 MILLIGRAM(S): at 05:27

## 2019-02-26 RX ADMIN — Medication 100 MILLIGRAM(S): at 17:29

## 2019-02-26 RX ADMIN — TAMSULOSIN HYDROCHLORIDE 0.4 MILLIGRAM(S): 0.4 CAPSULE ORAL at 22:10

## 2019-02-26 RX ADMIN — Medication 6: at 11:59

## 2019-02-26 RX ADMIN — INSULIN GLARGINE 10 UNIT(S): 100 INJECTION, SOLUTION SUBCUTANEOUS at 22:10

## 2019-02-26 RX ADMIN — DONEPEZIL HYDROCHLORIDE 10 MILLIGRAM(S): 10 TABLET, FILM COATED ORAL at 22:10

## 2019-02-26 RX ADMIN — Medication 2: at 16:50

## 2019-02-26 RX ADMIN — Medication 2: at 08:59

## 2019-02-26 RX ADMIN — HEPARIN SODIUM 5000 UNIT(S): 5000 INJECTION INTRAVENOUS; SUBCUTANEOUS at 05:27

## 2019-02-26 RX ADMIN — Medication 5 UNIT(S): at 12:00

## 2019-02-26 RX ADMIN — INSULIN GLARGINE 10 UNIT(S): 100 INJECTION, SOLUTION SUBCUTANEOUS at 09:01

## 2019-02-26 NOTE — PHYSICAL THERAPY INITIAL EVALUATION ADULT - SKIN INTEGRITY
R posterior lower leg, scab present, no open areas, no dressing required, cavilon prn, L foot 2nd toe, redness (previous site of wound), no dressing required, cavilon area

## 2019-02-26 NOTE — PHYSICAL THERAPY INITIAL EVALUATION ADULT - GENERAL OBSERVATIONS, REHAB EVAL
Patient's wounds measured and documented by P.T., Wound dressing recommendations discussed with TED Nguyen. Wound care P.T. will not be providing daily dressing care to patient. Wound care P.T. can be consulted as needed for wound re-assessments. pt with scab (1.0x1.0x1.0cm) to R posterior lower leg above ankle joint line. Pt open areas noted. redness observed to L 2nd toe (0.3x0.3x0.1cm), no open skin.
Chart (EMR) reviewed. Received supine c HOB elevated, NAD. +cardiac monitor donned. On enhanced observation. Alert. Ox1. Able to follow simple commands/directions.

## 2019-02-26 NOTE — PHYSICAL THERAPY INITIAL EVALUATION ADULT - CRITERIA FOR SKILLED THERAPEUTIC INTERVENTIONS
risk reduction/prevention/therapy frequency/predicted duration of therapy intervention/functional limitations in following categories/impairments found/rehab potential
risk reduction/prevention/therapy frequency/impairments found/functional limitations in following categories/predicted duration of therapy intervention/rehab potential

## 2019-02-26 NOTE — PHYSICAL THERAPY INITIAL EVALUATION ADULT - DISCHARGE DISPOSITION, PT EVAL
Subacute rehab./rehabilitation facility
Subacute rehab, wound care recommendations in flow sheet/rehabilitation facility/home w/ assist

## 2019-02-26 NOTE — PHYSICAL THERAPY INITIAL EVALUATION ADULT - ADDITIONAL COMMENTS
As per daughter (Cydney), patient lives c her in a pvt house c 4 entry steps c B/L rails, 1 flight of stairs c R rail up to go to the basement where his bedroom and bathroom located. Independent c all ADL's and household ambulation without device. Use crutches for community ambulation.
As per daughter (Cydney), patient lives c her in a pvt house c 4 entry steps c B/L rails, 1 flight of stairs c R rail up to go to the basement where his bedroom and bathroom located. Independent c all ADL's and household ambulation without device. Use crutches for community ambulation.

## 2019-02-26 NOTE — PHYSICAL THERAPY INITIAL EVALUATION ADULT - PLANNED THERAPY INTERVENTIONS, PT EVAL
balance training/bed mobility training/strengthening/transfer training/neuromuscular re-education/postural re-education/gait training
strengthening/transfer training/balance training/bed mobility training/gait training/neuromuscular re-education/postural re-education

## 2019-02-26 NOTE — PHYSICAL THERAPY INITIAL EVALUATION ADULT - PERTINENT HX OF CURRENT PROBLEM, REHAB EVAL
Patient is a 79 y/o male admitted to [facility] due to AMS, hyperglycemia.
Patient is a 77 y/o male admitted to [facility] due to AMS, hyperglycemia.

## 2019-02-26 NOTE — PHYSICAL THERAPY INITIAL EVALUATION ADULT - IMPAIRMENTS FOUND, PT EVAL
aerobic capacity/endurance/gait, locomotion, and balance/muscle strength/posture/poor safety awareness
gait, locomotion, and balance/aerobic capacity/endurance/muscle strength/poor safety awareness/posture

## 2019-02-26 NOTE — CONSULT NOTE ADULT - SUBJECTIVE AND OBJECTIVE BOX
Patient is a 78y old  Male who presents with a chief complaint of change in mental status (26 Feb 2019 06:42)      HPI:  Pt is a 78 y.o Male with PMHx of HTN, Type 2 DM, HLD, CKD stage IV, hx of right foot ulcer presented with altered mental status. Patient was admitted for metabolic encephalopathy and UTI.  Endocrinology was consulted for management of DM.  His Hgb A1c was 12.5% on this admission.  Pt is a poor historian, and states of only taking glipizide at home. Family is not at bedside.  He does not check his blood glucose. Denies of hypoglycemic events. Unclear of DM retinopathy or neuropathy.  Tolerating his meals.     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 1000 milliLiter(s) (50 mL/Hr) IV Continuous <Continuous>  dextrose 50% Injectable 12.5 Gram(s) IV Push once  dextrose 50% Injectable 25 Gram(s) IV Push once  dextrose 50% Injectable 25 Gram(s) IV Push once  donepezil 10 milliGRAM(s) Oral at bedtime  heparin  Injectable 5000 Unit(s) SubCutaneous every 12 hours  influenza   Vaccine 0.5 milliLiter(s) IntraMuscular once  insulin glargine Injectable (LANTUS) 10 Unit(s) SubCutaneous every morning  insulin glargine Injectable (LANTUS) 10 Unit(s) SubCutaneous at bedtime  insulin lispro (HumaLOG) corrective regimen sliding scale   SubCutaneous three times a day before meals  insulin lispro Injectable (HumaLOG) 5 Unit(s) SubCutaneous three times a day before meals  lactated ringers. 1000 milliLiter(s) (100 mL/Hr) IV Continuous <Continuous>  tamsulosin 0.4 milliGRAM(s) Oral at bedtime    MEDICATIONS  (PRN):  dextrose 40% Gel 15 Gram(s) Oral once PRN Blood Glucose LESS THAN 70 milliGRAM(s)/deciliter  glucagon  Injectable 1 milliGRAM(s) IntraMuscular once PRN Glucose LESS THAN 70 milligrams/deciliter  guaiFENesin    Syrup 200 milliGRAM(s) Oral every 6 hours PRN Cough      REVIEW OF SYSTEMS:  CONSTITUTIONAL:  as per HPI  HEENT:  Eyes:  No diplopia or blurred vision. ENT:  No earache, sore throat or runny nose.  CARDIOVASCULAR:  No pressure, squeezing, strangling, tightness, heaviness or aching about the chest, neck, axilla or epigastrium.  RESPIRATORY:  No cough, shortness of breath, PND or orthopnea.  GASTROINTESTINAL:  No nausea, vomiting or diarrhea.  GENITOURINARY:  No dysuria, frequency or urgency. No Blood in urine  MUSCULOSKELETAL:  no joint aches, no muscle pain, myalgia  SKIN:  No change in skin, hair or nails.  NEUROLOGIC:  No paresthesias, fasciculations, seizures or weakness.  PSYCHIATRIC:  No disorder of thought or mood.  ENDOCRINE:  No heat or cold intolerance, polyuria or polydipsia. abnormal weight gain or loss, oral thrush  HEMATOLOGICAL:  No easy bruising or bleeding.     T(C): 36.7 (02-26-19 @ 11:10), Max: 37.8 (02-25-19 @ 16:58)  HR: 87 (02-26-19 @ 11:10) (76 - 102)  BP: 140/86 (02-26-19 @ 11:10) (117/85 - 148/90)  RR: 18 (02-26-19 @ 11:10) (18 - 18)  SpO2: 97% (02-26-19 @ 11:10) (97% - 99%)  Wt(kg): --    PHYSICAL EXAM:  GENERAL: NAD, well-groomed, well-developed  HEAD:  Atraumatic, Normocephalic  EYES: EOMI, PERRLA, conjunctiva and sclera clear  ENMT: No tonsillar erythema, exudates, or enlargement; Moist mucous membranes, Good dentition, No lesions  NECK: Supple, No JVD, Normal thyroid  NERVOUS SYSTEM:  Alert & Oriented X3, Good concentration; Motor Strength 5/5 B/L upper and lower extremities; DTRs 2+ intact and symmetric  CHEST/LUNG: Clear to percussion bilaterally; No rales, rhonchi, wheezing, or rubs  HEART: Regular rate and rhythm; No murmurs, rubs, or gallops  ABDOMEN: Soft, Nontender, Nondistended; Bowel sounds present  EXTREMITIES:  2+ Peripheral Pulses, No clubbing, cyanosis, or edema  LYMPH: No lymphadenopathy noted  SKIN: No rashes or lesions    CAPILLARY BLOOD GLUCOSE      POCT Blood Glucose.: 186 mg/dL (26 Feb 2019 08:57)  POCT Blood Glucose.: 232 mg/dL (25 Feb 2019 21:53)  POCT Blood Glucose.: 267 mg/dL (25 Feb 2019 16:32)  POCT Blood Glucose.: 243 mg/dL (25 Feb 2019 11:52)                            9.9    7.38  )-----------( 367      ( 25 Feb 2019 07:59 )             28.4       CMP:  02-25 @ 07:59  SGPT --  Albumin --   Alk Phos --   Anion Gap 11   SGOT --   Total Bili --   BUN 25   Calcium Total 9.2   CO2 22   Chloride 107   Creatinine 1.60   eGFR if AA 47   eGFR if non AA 40   Glucose 204   Potassium 4.5   Protein --   Sodium 140

## 2019-02-27 ENCOUNTER — TRANSCRIPTION ENCOUNTER (OUTPATIENT)
Age: 79
End: 2019-02-27

## 2019-02-27 LAB
ANION GAP SERPL CALC-SCNC: 7 MMOL/L — SIGNIFICANT CHANGE UP (ref 5–17)
BUN SERPL-MCNC: 18 MG/DL — SIGNIFICANT CHANGE UP (ref 7–23)
CALCIUM SERPL-MCNC: 8.8 MG/DL — SIGNIFICANT CHANGE UP (ref 8.5–10.1)
CHLORIDE SERPL-SCNC: 103 MMOL/L — SIGNIFICANT CHANGE UP (ref 96–108)
CO2 SERPL-SCNC: 25 MMOL/L — SIGNIFICANT CHANGE UP (ref 22–31)
CREAT SERPL-MCNC: 1.37 MG/DL — HIGH (ref 0.5–1.3)
GLUCOSE BLDC GLUCOMTR-MCNC: 172 MG/DL — HIGH (ref 70–99)
GLUCOSE BLDC GLUCOMTR-MCNC: 178 MG/DL — HIGH (ref 70–99)
GLUCOSE BLDC GLUCOMTR-MCNC: 197 MG/DL — HIGH (ref 70–99)
GLUCOSE BLDC GLUCOMTR-MCNC: 272 MG/DL — HIGH (ref 70–99)
GLUCOSE SERPL-MCNC: 145 MG/DL — HIGH (ref 70–99)
HCT VFR BLD CALC: 29.1 % — LOW (ref 39–50)
HGB BLD-MCNC: 9.9 G/DL — LOW (ref 13–17)
MCHC RBC-ENTMCNC: 30.5 PG — SIGNIFICANT CHANGE UP (ref 27–34)
MCHC RBC-ENTMCNC: 34 GM/DL — SIGNIFICANT CHANGE UP (ref 32–36)
MCV RBC AUTO: 89.5 FL — SIGNIFICANT CHANGE UP (ref 80–100)
NRBC # BLD: 0 /100 WBCS — SIGNIFICANT CHANGE UP (ref 0–0)
PLATELET # BLD AUTO: 361 K/UL — SIGNIFICANT CHANGE UP (ref 150–400)
POTASSIUM SERPL-MCNC: 4.7 MMOL/L — SIGNIFICANT CHANGE UP (ref 3.5–5.3)
POTASSIUM SERPL-SCNC: 4.7 MMOL/L — SIGNIFICANT CHANGE UP (ref 3.5–5.3)
RBC # BLD: 3.25 M/UL — LOW (ref 4.2–5.8)
RBC # FLD: 13.5 % — SIGNIFICANT CHANGE UP (ref 10.3–14.5)
SODIUM SERPL-SCNC: 135 MMOL/L — SIGNIFICANT CHANGE UP (ref 135–145)
WBC # BLD: 7.66 K/UL — SIGNIFICANT CHANGE UP (ref 3.8–10.5)
WBC # FLD AUTO: 7.66 K/UL — SIGNIFICANT CHANGE UP (ref 3.8–10.5)

## 2019-02-27 RX ADMIN — HEPARIN SODIUM 5000 UNIT(S): 5000 INJECTION INTRAVENOUS; SUBCUTANEOUS at 17:35

## 2019-02-27 RX ADMIN — Medication 5 UNIT(S): at 17:36

## 2019-02-27 RX ADMIN — Medication 2: at 08:48

## 2019-02-27 RX ADMIN — Medication 2: at 17:36

## 2019-02-27 RX ADMIN — DONEPEZIL HYDROCHLORIDE 10 MILLIGRAM(S): 10 TABLET, FILM COATED ORAL at 21:14

## 2019-02-27 RX ADMIN — Medication 100 MILLIGRAM(S): at 17:35

## 2019-02-27 RX ADMIN — Medication 100 MILLIGRAM(S): at 05:16

## 2019-02-27 RX ADMIN — INSULIN GLARGINE 10 UNIT(S): 100 INJECTION, SOLUTION SUBCUTANEOUS at 08:56

## 2019-02-27 RX ADMIN — Medication 5 UNIT(S): at 08:49

## 2019-02-27 RX ADMIN — Medication 6: at 12:02

## 2019-02-27 RX ADMIN — Medication 5 UNIT(S): at 12:02

## 2019-02-27 RX ADMIN — HEPARIN SODIUM 5000 UNIT(S): 5000 INJECTION INTRAVENOUS; SUBCUTANEOUS at 05:17

## 2019-02-27 RX ADMIN — INSULIN GLARGINE 10 UNIT(S): 100 INJECTION, SOLUTION SUBCUTANEOUS at 21:13

## 2019-02-27 RX ADMIN — TAMSULOSIN HYDROCHLORIDE 0.4 MILLIGRAM(S): 0.4 CAPSULE ORAL at 21:14

## 2019-02-27 NOTE — DIETITIAN INITIAL EVALUATION ADULT. - PHYSICAL APPEARANCE
BMI=28.7(02/23), Nutrition focused physical exam conducted; Subcutaneous fat Exam;  [ WNL ]  Orbital fat pads region,  [ WNL  ]Buccal fat region,  [ mild ]triceps region, [ WNL  ]ribs region.  Muscle Exam; [  WNL ]temples region, [ WNL  ]clavicle region, [ WNL   ]shoulder region, [ WNL  ]Scapula region, [ WNL  ]Interosseous region, [ WNL  ]thigh region, [ WNL ]Calf region/overweight/other (specify)

## 2019-02-27 NOTE — DIETITIAN INITIAL EVALUATION ADULT. - OTHER INFO
Pt seen due to PA consult.  Pt c HbA1/GC=12.5 %.    Home meds PTA not noted.  Pt is currently being followed by Endocrinologist. Pt c fair- good oral intake @ present.  Diet education information c RD's contact number left @ bedside.

## 2019-02-27 NOTE — DISCHARGE NOTE PROVIDER - CARE PROVIDER_API CALL
Kenneth Rivera (DO)  Internal Medicine  [address] [place] [address]  Phone: [phone]  Fax: [phone]  Follow Up Time:

## 2019-02-27 NOTE — DIETITIAN INITIAL EVALUATION ADULT. - DIET TYPE
DASH/TLC (sodium and cholesterol restricted diet)/02/26/consistent carbohydrate (no snacks)/dysphagia 2, mechanical soft, nectar consistency fld

## 2019-02-27 NOTE — DIETITIAN INITIAL EVALUATION ADULT. - PERTINENT LABORATORY DATA
02-27 Hgb 9.9 g/dL<L> Hct 29.1 %<L> 02-27 Na135 mmol/L Glu 145 mg/dL<H> K+ 4.7 mmol/L Cr  1.37 mg/dL<H> BUN 18 mg/dL 02-24 Alb 2.4 g/dL<L> 02-24 [id number] 12.5 %<H>02-24 ALT 30 U/L AST 34 U/L Alkaline Phosphatase 95 U/L

## 2019-02-27 NOTE — DIETITIAN INITIAL EVALUATION ADULT. - PERTINENT MEDS FT
MEDICATIONS  (STANDING):  ciprofloxacin   IVPB 200 milliGRAM(s) IV Intermittent every 12 hours  dextrose 5%. 1000 milliLiter(s) (50 mL/Hr) IV Continuous <Continuous>  dextrose 50% Injectable 12.5 Gram(s) IV Push once  dextrose 50% Injectable 25 Gram(s) IV Push once  dextrose 50% Injectable 25 Gram(s) IV Push once  donepezil 10 milliGRAM(s) Oral at bedtime  heparin  Injectable 5000 Unit(s) SubCutaneous every 12 hours  influenza   Vaccine 0.5 milliLiter(s) IntraMuscular once  insulin glargine Injectable (LANTUS) 10 Unit(s) SubCutaneous every morning  insulin glargine Injectable (LANTUS) 10 Unit(s) SubCutaneous at bedtime  insulin lispro (HumaLOG) corrective regimen sliding scale   SubCutaneous three times a day before meals  insulin lispro Injectable (HumaLOG) 5 Unit(s) SubCutaneous three times a day before meals  lactated ringers. 1000 milliLiter(s) (100 mL/Hr) IV Continuous <Continuous>  tamsulosin 0.4 milliGRAM(s) Oral at bedtime    MEDICATIONS  (PRN):  dextrose 40% Gel 15 Gram(s) Oral once PRN Blood Glucose LESS THAN 70 milliGRAM(s)/deciliter  glucagon  Injectable 1 milliGRAM(s) IntraMuscular once PRN Glucose LESS THAN 70 milligrams/deciliter  guaiFENesin    Syrup 200 milliGRAM(s) Oral every 6 hours PRN Cough

## 2019-02-27 NOTE — DISCHARGE NOTE PROVIDER - NSDCCPCAREPLAN_GEN_ALL_CORE_FT
PRINCIPAL DISCHARGE DIAGNOSIS  Problem: Hyperglycemia  Assessment and Plan of Treatment:       SECONDARY DISCHARGE DIAGNOSES  Problem: Urinary tract infection without hematuria, site unspecified  Assessment and Plan of Treatment: Urinary tract infection without hematuria, site unspecified    Problem: Type 2 diabetes mellitus with stage 4 chronic kidney disease, without long-term current use of insulin  Assessment and Plan of Treatment: Type 2 diabetes mellitus with stage 4 chronic kidney disease, without long-term current use of insulin    Problem: Metabolic encephalopathy  Assessment and Plan of Treatment: Metabolic encephalopathy    Problem: BELIA (acute kidney injury)  Assessment and Plan of Treatment: BELIA (acute kidney injury)

## 2019-02-27 NOTE — DIETITIAN INITIAL EVALUATION ADULT. - FACTORS AFF FOOD INTAKE
difficulty chewing/difficulty swallowing/02/25, swallow evaluation recommended Dysphagia 2 Mechanical Soft - [place] Consistency Fluids 02/25/change in mental status/other (specify)

## 2019-02-27 NOTE — DIETITIAN INITIAL EVALUATION ADULT. - ENERGY NEEDS
Height (cm): 172.72 (02-22)  Weight (kg): 85.5 (02-23)  BMI (kg/m2): 28.7 (02-23)  IBW: 69.8 kg        % IBW: 122%       UBW: ?        %UBW: ?

## 2019-02-28 LAB
CULTURE RESULTS: SIGNIFICANT CHANGE UP
CULTURE RESULTS: SIGNIFICANT CHANGE UP
GLUCOSE BLDC GLUCOMTR-MCNC: 123 MG/DL — HIGH (ref 70–99)
GLUCOSE BLDC GLUCOMTR-MCNC: 174 MG/DL — HIGH (ref 70–99)
GLUCOSE BLDC GLUCOMTR-MCNC: 184 MG/DL — HIGH (ref 70–99)
GLUCOSE BLDC GLUCOMTR-MCNC: 189 MG/DL — HIGH (ref 70–99)
GLUCOSE BLDC GLUCOMTR-MCNC: 239 MG/DL — HIGH (ref 70–99)
SPECIMEN SOURCE: SIGNIFICANT CHANGE UP
SPECIMEN SOURCE: SIGNIFICANT CHANGE UP

## 2019-02-28 RX ORDER — DONEPEZIL HYDROCHLORIDE 10 MG/1
1 TABLET, FILM COATED ORAL
Qty: 0 | Refills: 0 | COMMUNITY
Start: 2019-02-28

## 2019-02-28 RX ORDER — INSULIN GLARGINE 100 [IU]/ML
15 INJECTION, SOLUTION SUBCUTANEOUS
Qty: 0 | Refills: 0 | COMMUNITY
Start: 2019-02-28

## 2019-02-28 RX ORDER — INSULIN LISPRO 100/ML
5 VIAL (ML) SUBCUTANEOUS
Qty: 1 | Refills: 0 | OUTPATIENT
Start: 2019-02-28 | End: 2019-03-29

## 2019-02-28 RX ORDER — TAMSULOSIN HYDROCHLORIDE 0.4 MG/1
1 CAPSULE ORAL
Qty: 0 | Refills: 0 | COMMUNITY
Start: 2019-02-28

## 2019-02-28 RX ADMIN — Medication 100 MILLIGRAM(S): at 05:21

## 2019-02-28 RX ADMIN — Medication 5 UNIT(S): at 08:22

## 2019-02-28 RX ADMIN — HEPARIN SODIUM 5000 UNIT(S): 5000 INJECTION INTRAVENOUS; SUBCUTANEOUS at 05:21

## 2019-02-28 RX ADMIN — Medication 4: at 11:38

## 2019-02-28 RX ADMIN — Medication 5 UNIT(S): at 16:20

## 2019-02-28 RX ADMIN — Medication 2: at 16:20

## 2019-02-28 RX ADMIN — INSULIN GLARGINE 10 UNIT(S): 100 INJECTION, SOLUTION SUBCUTANEOUS at 08:23

## 2019-02-28 RX ADMIN — DONEPEZIL HYDROCHLORIDE 10 MILLIGRAM(S): 10 TABLET, FILM COATED ORAL at 21:08

## 2019-02-28 RX ADMIN — Medication 100 MILLIGRAM(S): at 17:08

## 2019-02-28 RX ADMIN — TAMSULOSIN HYDROCHLORIDE 0.4 MILLIGRAM(S): 0.4 CAPSULE ORAL at 21:08

## 2019-02-28 RX ADMIN — HEPARIN SODIUM 5000 UNIT(S): 5000 INJECTION INTRAVENOUS; SUBCUTANEOUS at 17:08

## 2019-02-28 RX ADMIN — Medication 5 UNIT(S): at 11:39

## 2019-02-28 RX ADMIN — INSULIN GLARGINE 10 UNIT(S): 100 INJECTION, SOLUTION SUBCUTANEOUS at 21:08

## 2019-02-28 RX ADMIN — Medication 200 MILLIGRAM(S): at 16:21

## 2019-03-01 LAB
GLUCOSE BLDC GLUCOMTR-MCNC: 100 MG/DL — HIGH (ref 70–99)
GLUCOSE BLDC GLUCOMTR-MCNC: 154 MG/DL — HIGH (ref 70–99)
GLUCOSE BLDC GLUCOMTR-MCNC: 196 MG/DL — HIGH (ref 70–99)
GLUCOSE BLDC GLUCOMTR-MCNC: 217 MG/DL — HIGH (ref 70–99)

## 2019-03-01 RX ADMIN — Medication 5 UNIT(S): at 17:03

## 2019-03-01 RX ADMIN — HEPARIN SODIUM 5000 UNIT(S): 5000 INJECTION INTRAVENOUS; SUBCUTANEOUS at 05:11

## 2019-03-01 RX ADMIN — TAMSULOSIN HYDROCHLORIDE 0.4 MILLIGRAM(S): 0.4 CAPSULE ORAL at 21:48

## 2019-03-01 RX ADMIN — Medication 100 MILLIGRAM(S): at 17:05

## 2019-03-01 RX ADMIN — HEPARIN SODIUM 5000 UNIT(S): 5000 INJECTION INTRAVENOUS; SUBCUTANEOUS at 17:05

## 2019-03-01 RX ADMIN — INSULIN GLARGINE 10 UNIT(S): 100 INJECTION, SOLUTION SUBCUTANEOUS at 08:38

## 2019-03-01 RX ADMIN — Medication 4: at 11:35

## 2019-03-01 RX ADMIN — Medication 100 MILLIGRAM(S): at 05:12

## 2019-03-01 RX ADMIN — DONEPEZIL HYDROCHLORIDE 10 MILLIGRAM(S): 10 TABLET, FILM COATED ORAL at 21:48

## 2019-03-01 RX ADMIN — Medication 2: at 17:03

## 2019-03-01 RX ADMIN — Medication 5 UNIT(S): at 11:35

## 2019-03-01 RX ADMIN — Medication 30 MILLILITER(S): at 17:04

## 2019-03-01 RX ADMIN — Medication 5 UNIT(S): at 08:38

## 2019-03-01 RX ADMIN — INSULIN GLARGINE 10 UNIT(S): 100 INJECTION, SOLUTION SUBCUTANEOUS at 21:49

## 2019-03-02 LAB
GLUCOSE BLDC GLUCOMTR-MCNC: 116 MG/DL — HIGH (ref 70–99)
GLUCOSE BLDC GLUCOMTR-MCNC: 123 MG/DL — HIGH (ref 70–99)
GLUCOSE BLDC GLUCOMTR-MCNC: 180 MG/DL — HIGH (ref 70–99)
GLUCOSE BLDC GLUCOMTR-MCNC: 226 MG/DL — HIGH (ref 70–99)
GLUCOSE BLDC GLUCOMTR-MCNC: 266 MG/DL — HIGH (ref 70–99)
GLUCOSE BLDC GLUCOMTR-MCNC: 270 MG/DL — HIGH (ref 70–99)

## 2019-03-02 RX ADMIN — Medication 100 MILLIGRAM(S): at 17:21

## 2019-03-02 RX ADMIN — Medication 5 UNIT(S): at 08:19

## 2019-03-02 RX ADMIN — INSULIN GLARGINE 10 UNIT(S): 100 INJECTION, SOLUTION SUBCUTANEOUS at 07:46

## 2019-03-02 RX ADMIN — Medication 100 MILLIGRAM(S): at 05:19

## 2019-03-02 RX ADMIN — HEPARIN SODIUM 5000 UNIT(S): 5000 INJECTION INTRAVENOUS; SUBCUTANEOUS at 05:18

## 2019-03-02 RX ADMIN — TAMSULOSIN HYDROCHLORIDE 0.4 MILLIGRAM(S): 0.4 CAPSULE ORAL at 21:40

## 2019-03-02 RX ADMIN — Medication 6: at 11:06

## 2019-03-02 RX ADMIN — HEPARIN SODIUM 5000 UNIT(S): 5000 INJECTION INTRAVENOUS; SUBCUTANEOUS at 17:23

## 2019-03-02 RX ADMIN — Medication 5 UNIT(S): at 17:20

## 2019-03-02 RX ADMIN — Medication 2: at 16:21

## 2019-03-02 RX ADMIN — INSULIN GLARGINE 10 UNIT(S): 100 INJECTION, SOLUTION SUBCUTANEOUS at 21:39

## 2019-03-02 RX ADMIN — Medication 5 UNIT(S): at 13:02

## 2019-03-02 RX ADMIN — DONEPEZIL HYDROCHLORIDE 10 MILLIGRAM(S): 10 TABLET, FILM COATED ORAL at 21:40

## 2019-03-03 LAB
GLUCOSE BLDC GLUCOMTR-MCNC: 108 MG/DL — HIGH (ref 70–99)
GLUCOSE BLDC GLUCOMTR-MCNC: 162 MG/DL — HIGH (ref 70–99)
GLUCOSE BLDC GLUCOMTR-MCNC: 185 MG/DL — HIGH (ref 70–99)
GLUCOSE BLDC GLUCOMTR-MCNC: 211 MG/DL — HIGH (ref 70–99)

## 2019-03-03 RX ORDER — CIPROFLOXACIN LACTATE 400MG/40ML
500 VIAL (ML) INTRAVENOUS EVERY 12 HOURS
Qty: 0 | Refills: 0 | Status: DISCONTINUED | OUTPATIENT
Start: 2019-03-03 | End: 2019-03-07

## 2019-03-03 RX ADMIN — HEPARIN SODIUM 5000 UNIT(S): 5000 INJECTION INTRAVENOUS; SUBCUTANEOUS at 19:38

## 2019-03-03 RX ADMIN — TAMSULOSIN HYDROCHLORIDE 0.4 MILLIGRAM(S): 0.4 CAPSULE ORAL at 22:59

## 2019-03-03 RX ADMIN — Medication 4: at 12:43

## 2019-03-03 RX ADMIN — INSULIN GLARGINE 10 UNIT(S): 100 INJECTION, SOLUTION SUBCUTANEOUS at 08:32

## 2019-03-03 RX ADMIN — Medication 5 UNIT(S): at 16:46

## 2019-03-03 RX ADMIN — Medication 5 UNIT(S): at 12:43

## 2019-03-03 RX ADMIN — HEPARIN SODIUM 5000 UNIT(S): 5000 INJECTION INTRAVENOUS; SUBCUTANEOUS at 05:09

## 2019-03-03 RX ADMIN — Medication 5 UNIT(S): at 08:32

## 2019-03-03 RX ADMIN — Medication 100 MILLIGRAM(S): at 05:08

## 2019-03-03 RX ADMIN — INSULIN GLARGINE 10 UNIT(S): 100 INJECTION, SOLUTION SUBCUTANEOUS at 22:58

## 2019-03-03 RX ADMIN — DONEPEZIL HYDROCHLORIDE 10 MILLIGRAM(S): 10 TABLET, FILM COATED ORAL at 22:58

## 2019-03-03 RX ADMIN — Medication 2: at 16:47

## 2019-03-04 PROBLEM — I87.311 CHRONIC VENOUS HYPERTENSION WITH ULCER, RIGHT: Status: ACTIVE | Noted: 2018-10-02

## 2019-03-04 LAB
ANION GAP SERPL CALC-SCNC: 6 MMOL/L — SIGNIFICANT CHANGE UP (ref 5–17)
BUN SERPL-MCNC: 19 MG/DL — SIGNIFICANT CHANGE UP (ref 7–23)
CALCIUM SERPL-MCNC: 8.6 MG/DL — SIGNIFICANT CHANGE UP (ref 8.5–10.1)
CHLORIDE SERPL-SCNC: 102 MMOL/L — SIGNIFICANT CHANGE UP (ref 96–108)
CO2 SERPL-SCNC: 28 MMOL/L — SIGNIFICANT CHANGE UP (ref 22–31)
CREAT SERPL-MCNC: 1.27 MG/DL — SIGNIFICANT CHANGE UP (ref 0.5–1.3)
GLUCOSE BLDC GLUCOMTR-MCNC: 102 MG/DL — HIGH (ref 70–99)
GLUCOSE BLDC GLUCOMTR-MCNC: 120 MG/DL — HIGH (ref 70–99)
GLUCOSE BLDC GLUCOMTR-MCNC: 160 MG/DL — HIGH (ref 70–99)
GLUCOSE BLDC GLUCOMTR-MCNC: 211 MG/DL — HIGH (ref 70–99)
GLUCOSE BLDC GLUCOMTR-MCNC: 86 MG/DL — SIGNIFICANT CHANGE UP (ref 70–99)
GLUCOSE SERPL-MCNC: 98 MG/DL — SIGNIFICANT CHANGE UP (ref 70–99)
HCT VFR BLD CALC: 28.6 % — LOW (ref 39–50)
HGB BLD-MCNC: 10 G/DL — LOW (ref 13–17)
MCHC RBC-ENTMCNC: 31.1 PG — SIGNIFICANT CHANGE UP (ref 27–34)
MCHC RBC-ENTMCNC: 35 GM/DL — SIGNIFICANT CHANGE UP (ref 32–36)
MCV RBC AUTO: 88.8 FL — SIGNIFICANT CHANGE UP (ref 80–100)
NRBC # BLD: 0 /100 WBCS — SIGNIFICANT CHANGE UP (ref 0–0)
PLATELET # BLD AUTO: 353 K/UL — SIGNIFICANT CHANGE UP (ref 150–400)
POTASSIUM SERPL-MCNC: 4.4 MMOL/L — SIGNIFICANT CHANGE UP (ref 3.5–5.3)
POTASSIUM SERPL-SCNC: 4.4 MMOL/L — SIGNIFICANT CHANGE UP (ref 3.5–5.3)
RBC # BLD: 3.22 M/UL — LOW (ref 4.2–5.8)
RBC # FLD: 13.5 % — SIGNIFICANT CHANGE UP (ref 10.3–14.5)
SODIUM SERPL-SCNC: 136 MMOL/L — SIGNIFICANT CHANGE UP (ref 135–145)
WBC # BLD: 8.37 K/UL — SIGNIFICANT CHANGE UP (ref 3.8–10.5)
WBC # FLD AUTO: 8.37 K/UL — SIGNIFICANT CHANGE UP (ref 3.8–10.5)

## 2019-03-04 RX ADMIN — HEPARIN SODIUM 5000 UNIT(S): 5000 INJECTION INTRAVENOUS; SUBCUTANEOUS at 17:29

## 2019-03-04 RX ADMIN — HEPARIN SODIUM 5000 UNIT(S): 5000 INJECTION INTRAVENOUS; SUBCUTANEOUS at 06:15

## 2019-03-04 RX ADMIN — INSULIN GLARGINE 10 UNIT(S): 100 INJECTION, SOLUTION SUBCUTANEOUS at 22:03

## 2019-03-04 RX ADMIN — TAMSULOSIN HYDROCHLORIDE 0.4 MILLIGRAM(S): 0.4 CAPSULE ORAL at 22:03

## 2019-03-04 RX ADMIN — INSULIN GLARGINE 10 UNIT(S): 100 INJECTION, SOLUTION SUBCUTANEOUS at 08:54

## 2019-03-04 RX ADMIN — Medication 4: at 12:25

## 2019-03-04 RX ADMIN — DONEPEZIL HYDROCHLORIDE 10 MILLIGRAM(S): 10 TABLET, FILM COATED ORAL at 22:03

## 2019-03-04 RX ADMIN — Medication 500 MILLIGRAM(S): at 17:29

## 2019-03-04 RX ADMIN — Medication 5 UNIT(S): at 12:26

## 2019-03-04 RX ADMIN — Medication 500 MILLIGRAM(S): at 06:15

## 2019-03-04 RX ADMIN — Medication 5 UNIT(S): at 08:49

## 2019-03-05 DIAGNOSIS — R26.81 UNSTEADINESS ON FEET: ICD-10-CM

## 2019-03-05 LAB
GLUCOSE BLDC GLUCOMTR-MCNC: 155 MG/DL — HIGH (ref 70–99)
GLUCOSE BLDC GLUCOMTR-MCNC: 157 MG/DL — HIGH (ref 70–99)
GLUCOSE BLDC GLUCOMTR-MCNC: 161 MG/DL — HIGH (ref 70–99)
GLUCOSE BLDC GLUCOMTR-MCNC: 166 MG/DL — HIGH (ref 70–99)
GLUCOSE BLDC GLUCOMTR-MCNC: 85 MG/DL — SIGNIFICANT CHANGE UP (ref 70–99)
GLUCOSE BLDC GLUCOMTR-MCNC: 89 MG/DL — SIGNIFICANT CHANGE UP (ref 70–99)

## 2019-03-05 RX ORDER — INSULIN GLARGINE 100 [IU]/ML
7 INJECTION, SOLUTION SUBCUTANEOUS AT BEDTIME
Qty: 0 | Refills: 0 | Status: DISCONTINUED | OUTPATIENT
Start: 2019-03-05 | End: 2019-03-07

## 2019-03-05 RX ORDER — INSULIN LISPRO 100/ML
4 VIAL (ML) SUBCUTANEOUS
Qty: 0 | Refills: 0 | Status: DISCONTINUED | OUTPATIENT
Start: 2019-03-05 | End: 2019-03-07

## 2019-03-05 RX ADMIN — Medication 2: at 17:40

## 2019-03-05 RX ADMIN — Medication 500 MILLIGRAM(S): at 17:41

## 2019-03-05 RX ADMIN — TAMSULOSIN HYDROCHLORIDE 0.4 MILLIGRAM(S): 0.4 CAPSULE ORAL at 21:29

## 2019-03-05 RX ADMIN — Medication 500 MILLIGRAM(S): at 05:47

## 2019-03-05 RX ADMIN — Medication 2: at 11:59

## 2019-03-05 RX ADMIN — HEPARIN SODIUM 5000 UNIT(S): 5000 INJECTION INTRAVENOUS; SUBCUTANEOUS at 17:41

## 2019-03-05 RX ADMIN — INSULIN GLARGINE 7 UNIT(S): 100 INJECTION, SOLUTION SUBCUTANEOUS at 21:29

## 2019-03-05 RX ADMIN — Medication 4 UNIT(S): at 12:00

## 2019-03-05 RX ADMIN — HEPARIN SODIUM 5000 UNIT(S): 5000 INJECTION INTRAVENOUS; SUBCUTANEOUS at 05:47

## 2019-03-05 RX ADMIN — Medication 5 UNIT(S): at 08:58

## 2019-03-05 RX ADMIN — INSULIN GLARGINE 10 UNIT(S): 100 INJECTION, SOLUTION SUBCUTANEOUS at 08:52

## 2019-03-05 RX ADMIN — Medication 4 UNIT(S): at 17:41

## 2019-03-05 RX ADMIN — DONEPEZIL HYDROCHLORIDE 10 MILLIGRAM(S): 10 TABLET, FILM COATED ORAL at 21:29

## 2019-03-06 LAB
GLUCOSE BLDC GLUCOMTR-MCNC: 126 MG/DL — HIGH (ref 70–99)
GLUCOSE BLDC GLUCOMTR-MCNC: 150 MG/DL — HIGH (ref 70–99)
GLUCOSE BLDC GLUCOMTR-MCNC: 152 MG/DL — HIGH (ref 70–99)
GLUCOSE BLDC GLUCOMTR-MCNC: 174 MG/DL — HIGH (ref 70–99)

## 2019-03-06 RX ADMIN — Medication 500 MILLIGRAM(S): at 18:12

## 2019-03-06 RX ADMIN — Medication 4 UNIT(S): at 08:28

## 2019-03-06 RX ADMIN — INSULIN GLARGINE 7 UNIT(S): 100 INJECTION, SOLUTION SUBCUTANEOUS at 22:03

## 2019-03-06 RX ADMIN — TAMSULOSIN HYDROCHLORIDE 0.4 MILLIGRAM(S): 0.4 CAPSULE ORAL at 22:03

## 2019-03-06 RX ADMIN — Medication 2: at 16:23

## 2019-03-06 RX ADMIN — HEPARIN SODIUM 5000 UNIT(S): 5000 INJECTION INTRAVENOUS; SUBCUTANEOUS at 18:12

## 2019-03-06 RX ADMIN — DONEPEZIL HYDROCHLORIDE 10 MILLIGRAM(S): 10 TABLET, FILM COATED ORAL at 22:03

## 2019-03-06 RX ADMIN — Medication 4 UNIT(S): at 12:00

## 2019-03-06 RX ADMIN — Medication 500 MILLIGRAM(S): at 05:26

## 2019-03-06 RX ADMIN — HEPARIN SODIUM 5000 UNIT(S): 5000 INJECTION INTRAVENOUS; SUBCUTANEOUS at 05:25

## 2019-03-06 RX ADMIN — Medication 0: at 11:58

## 2019-03-06 RX ADMIN — Medication 4 UNIT(S): at 16:23

## 2019-03-06 RX ADMIN — INSULIN GLARGINE 10 UNIT(S): 100 INJECTION, SOLUTION SUBCUTANEOUS at 08:18

## 2019-03-06 NOTE — CHART NOTE - NSCHARTNOTEFT_GEN_A_CORE
Assessment:   Pt adm c dx of hyperglycemia, AMS, BELIA.  PMH include ulcer of foot, GERD, HTN, DM, PVD.  Family was not present when pt seen.     Factors impacting intake: [ ] none [ ] nausea  [ ] vomiting [ ] diarrhea [ ] constipation  [ ]chewing problems [x ] swallowing issues; on Dysphagia 2 Mechanical Soft - [place] Consistency Fluids  [ x] other: AMS    Diet Prescription: Diet, Consistent Carbohydrate/No Snacks:   DASH/TLC {Sodium & Cholesterol Restricted}  Dysphagia 2, Mechancial Soft, Nectar Consistency Fluid (DYS2NC) (02-26-19 @ 09:56)    Intake: fluctuation in oral intake from 25->50-> 100% noted, RD noted that pt spat out meat @ lunch meal today    Current Weight: 03/06, 85 kg, 02/23, 85.5 kg, c wt. loss of 0.5 kg  % Weight Change: 0.58%  1+ edema of feet, legs & ankles noted         Pertinent Medications: MEDICATIONS  (STANDING):  ciprofloxacin     Tablet 500 milliGRAM(s) Oral every 12 hours  dextrose 5%. 1000 milliLiter(s) (50 mL/Hr) IV Continuous <Continuous>  dextrose 50% Injectable 12.5 Gram(s) IV Push once  dextrose 50% Injectable 25 Gram(s) IV Push once  dextrose 50% Injectable 25 Gram(s) IV Push once  donepezil 10 milliGRAM(s) Oral at bedtime  heparin  Injectable 5000 Unit(s) SubCutaneous every 12 hours  influenza   Vaccine 0.5 milliLiter(s) IntraMuscular once  insulin glargine Injectable (LANTUS) 10 Unit(s) SubCutaneous every morning  insulin glargine Injectable (LANTUS) 7 Unit(s) SubCutaneous at bedtime  insulin lispro (HumaLOG) corrective regimen sliding scale   SubCutaneous three times a day before meals  insulin lispro Injectable (HumaLOG) 4 Unit(s) SubCutaneous three times a day before meals  lactated ringers. 1000 milliLiter(s) (100 mL/Hr) IV Continuous <Continuous>  tamsulosin 0.4 milliGRAM(s) Oral at bedtime    MEDICATIONS  (PRN):  aluminum hydroxide/magnesium hydroxide/simethicone Suspension 30 milliLiter(s) Oral every 6 hours PRN Dyspepsia  dextrose 40% Gel 15 Gram(s) Oral once PRN Blood Glucose LESS THAN 70 milliGRAM(s)/deciliter  glucagon  Injectable 1 milliGRAM(s) IntraMuscular once PRN Glucose LESS THAN 70 milligrams/deciliter  guaiFENesin    Syrup 200 milliGRAM(s) Oral every 6 hours PRN Cough    Pertinent Labs: 03-04 Na136 mmol/L Glu 98 mg/dL K+ 4.4 mmol/L Cr  1.27 mg/dL BUN 19 mg/dL 02-24 [id number] 12.5 %<H>     CAPILLARY BLOOD GLUCOSE      POCT Blood Glucose.: 126 mg/dL (06 Mar 2019 11:15)  POCT Blood Glucose.: 150 mg/dL (06 Mar 2019 08:14)  POCT Blood Glucose.: 161 mg/dL (05 Mar 2019 21:27)  POCT Blood Glucose.: 155 mg/dL (05 Mar 2019 17:39)  POCT Blood Glucose.: 157 mg/dL (05 Mar 2019 15:41)    Skin: right posterior leg scab noted     Estimated Needs:   [ x] no change since previous assessment(02/27)  [ ] recalculated:     Previous Nutrition Diagnosis:   [x] no diagnosis  [ ] Inadequate Energy Intake [ ]Inadequate Oral Intake [ ] Excessive Energy Intake   [ ] Underweight [ ] Increased Nutrient Needs [ ] Overweight/Obesity   [ ] Altered GI Function [ ] Unintended Weight Loss [ ] Food & Nutrition Related Knowledge Deficit [ ] Malnutrition     Nutrition Diagnosis is [ ] ongoing  [ ] resolved [ ] not applicable     New Nutrition Diagnosis: [ ] not applicable       Interventions:   Recommend  [ ] Change Diet To:  [ ] Nutrition Supplement  [ ] Nutrition Support  [ ] Other:     Monitoring and Evaluation:   [ ] PO intake [ x ] Tolerance to diet prescription [ x ] weights [ x ] labs[ x ] follow up per protocol  [ ] other: Assessment:   Pt adm c dx of hyperglycemia, AMS, BELIA.  PMH include ulcer of foot, GERD, HTN, DM, PVD.  Pt is not appropriate for education, forgetfulness, confusion @ times noted.   Family was not present when pt seen. RD left diet education information c contact # for family.     Factors impacting intake: [ ] none [ ] nausea  [ ] vomiting [ ] diarrhea [ ] constipation  [ ]chewing problems [x ] swallowing issues; on Dysphagia 2 Mechanical Soft - [place] Consistency Fluids  [ x] other: AMS    Diet Prescription: Diet, Consistent Carbohydrate/No Snacks:   DASH/TLC {Sodium & Cholesterol Restricted}  Dysphagia 2, Mechancial Soft, Nectar Consistency Fluid (DYS2NC) (02-26-19 @ 09:56)    Intake: fluctuation in oral intake from 25->50-> 100% noted, RD noted that pt spat out meat @ lunch meal today, stated that it was "trashy/dry", requested gravy c meat     Current Weight: 03/06, 85 kg, 02/23, 85.5 kg, c wt. loss of 0.5 kg  % Weight Change: 0.58%  1+ edema of feet, legs & ankles noted     Nutrition focused physical exam conducted; Subcutaneous fat Exam;  [ WNL ]  Orbital fat pads region,  [ WNL  ]Buccal fat region,  [ mild ]triceps region, [ WNL  ]ribs region.  Muscle Exam; [  WNL ]temples region, [ WNL  ]clavicle region, [ WNL   ]shoulder region, [ WNL  ]Scapula region, [ WNL  ]Interosseous region, [ WNL  ]thigh region, [ unable due to edema ]Calf region      Pertinent Medications: MEDICATIONS  (STANDING):  ciprofloxacin     Tablet 500 milliGRAM(s) Oral every 12 hours  dextrose 5%. 1000 milliLiter(s) (50 mL/Hr) IV Continuous <Continuous>  dextrose 50% Injectable 12.5 Gram(s) IV Push once  dextrose 50% Injectable 25 Gram(s) IV Push once  dextrose 50% Injectable 25 Gram(s) IV Push once  donepezil 10 milliGRAM(s) Oral at bedtime  heparin  Injectable 5000 Unit(s) SubCutaneous every 12 hours  influenza   Vaccine 0.5 milliLiter(s) IntraMuscular once  insulin glargine Injectable (LANTUS) 10 Unit(s) SubCutaneous every morning  insulin glargine Injectable (LANTUS) 7 Unit(s) SubCutaneous at bedtime  insulin lispro (HumaLOG) corrective regimen sliding scale   SubCutaneous three times a day before meals  insulin lispro Injectable (HumaLOG) 4 Unit(s) SubCutaneous three times a day before meals  lactated ringers. 1000 milliLiter(s) (100 mL/Hr) IV Continuous <Continuous>  tamsulosin 0.4 milliGRAM(s) Oral at bedtime    MEDICATIONS  (PRN):  aluminum hydroxide/magnesium hydroxide/simethicone Suspension 30 milliLiter(s) Oral every 6 hours PRN Dyspepsia  dextrose 40% Gel 15 Gram(s) Oral once PRN Blood Glucose LESS THAN 70 milliGRAM(s)/deciliter  glucagon  Injectable 1 milliGRAM(s) IntraMuscular once PRN Glucose LESS THAN 70 milligrams/deciliter  guaiFENesin    Syrup 200 milliGRAM(s) Oral every 6 hours PRN Cough    Pertinent Labs: 03-04 Na136 mmol/L Glu 98 mg/dL K+ 4.4 mmol/L Cr  1.27 mg/dL BUN 19 mg/dL 02-24 [id number] 12.5 %<H>     CAPILLARY BLOOD GLUCOSE      POCT Blood Glucose.: 126 mg/dL (06 Mar 2019 11:15)  POCT Blood Glucose.: 150 mg/dL (06 Mar 2019 08:14)  POCT Blood Glucose.: 161 mg/dL (05 Mar 2019 21:27)  POCT Blood Glucose.: 155 mg/dL (05 Mar 2019 17:39)  POCT Blood Glucose.: 157 mg/dL (05 Mar 2019 15:41)    Skin: right posterior leg scab noted     Estimated Needs:   [ x] no change since previous assessment(02/27)  [ ] recalculated:     Previous Nutrition Diagnosis:   [x] no diagnosis  [ ] Inadequate Energy Intake [ ]Inadequate Oral Intake [ ] Excessive Energy Intake   [ ] Underweight [ ] Increased Nutrient Needs [ ] Overweight/Obesity   [ ] Altered GI Function [ ] Unintended Weight Loss [ ] Food & Nutrition Related Knowledge Deficit [ ] Malnutrition     Nutrition Diagnosis is [ ] ongoing  [ ] resolved [ ] not applicable     New Nutrition Diagnosis: [ ] not applicable       Interventions  Recommend  [x] Continue c current diet regimen, will add gravy c meats   [ ] Change Diet To:  [ ] Nutrition Supplement  [ ] Nutrition Support  [ x] Other: consider swallow re-evaluation    Monitoring and Evaluation:   [x ] PO intake [ x ] Tolerance to diet prescription [ x ] weights [ x ] labs[ x ] follow up per protocol  [ x] other: monitor for altered chew/swallow

## 2019-03-06 NOTE — PROGRESS NOTE ADULT - PROBLEM SELECTOR PROBLEM 3
Urinary tract infection without hematuria, site unspecified
BELIA (acute kidney injury)
Hyperglycemia
Type 2 diabetes mellitus with stage 4 chronic kidney disease, without long-term current use of insulin

## 2019-03-07 ENCOUNTER — TRANSCRIPTION ENCOUNTER (OUTPATIENT)
Age: 79
End: 2019-03-07

## 2019-03-07 VITALS
HEART RATE: 74 BPM | DIASTOLIC BLOOD PRESSURE: 78 MMHG | TEMPERATURE: 97 F | RESPIRATION RATE: 20 BRPM | SYSTOLIC BLOOD PRESSURE: 144 MMHG

## 2019-03-07 LAB
GLUCOSE BLDC GLUCOMTR-MCNC: 112 MG/DL — HIGH (ref 70–99)
GLUCOSE BLDC GLUCOMTR-MCNC: 126 MG/DL — HIGH (ref 70–99)
GLUCOSE BLDC GLUCOMTR-MCNC: 249 MG/DL — HIGH (ref 70–99)

## 2019-03-07 RX ORDER — DONEPEZIL HYDROCHLORIDE 10 MG/1
1 TABLET, FILM COATED ORAL
Qty: 15 | Refills: 0 | OUTPATIENT
Start: 2019-03-07 | End: 2019-03-21

## 2019-03-07 RX ORDER — TAMSULOSIN HYDROCHLORIDE 0.4 MG/1
1 CAPSULE ORAL
Qty: 15 | Refills: 0 | OUTPATIENT
Start: 2019-03-07 | End: 2019-03-21

## 2019-03-07 RX ORDER — TAMSULOSIN HYDROCHLORIDE 0.4 MG/1
1 CAPSULE ORAL
Qty: 30 | Refills: 0
Start: 2019-03-07 | End: 2019-04-05

## 2019-03-07 RX ORDER — METFORMIN HYDROCHLORIDE 850 MG/1
1 TABLET ORAL
Qty: 60 | Refills: 0 | OUTPATIENT
Start: 2019-03-07

## 2019-03-07 RX ORDER — INSULIN GLARGINE 100 [IU]/ML
15 INJECTION, SOLUTION SUBCUTANEOUS
Qty: 200 | Refills: 0 | OUTPATIENT
Start: 2019-03-07

## 2019-03-07 RX ORDER — INSULIN GLARGINE 100 [IU]/ML
15 INJECTION, SOLUTION SUBCUTANEOUS
Qty: 5 | Refills: 0 | OUTPATIENT
Start: 2019-03-07 | End: 2019-04-05

## 2019-03-07 RX ORDER — ENOXAPARIN SODIUM 100 MG/ML
15 INJECTION SUBCUTANEOUS
Qty: 20 | Refills: 0
Start: 2019-03-07

## 2019-03-07 RX ORDER — DONEPEZIL HYDROCHLORIDE 10 MG/1
1 TABLET, FILM COATED ORAL
Qty: 30 | Refills: 0
Start: 2019-03-07 | End: 2019-04-05

## 2019-03-07 RX ORDER — METFORMIN HYDROCHLORIDE 850 MG/1
1 TABLET ORAL
Qty: 60 | Refills: 0
Start: 2019-03-07 | End: 2019-04-05

## 2019-03-07 RX ADMIN — HEPARIN SODIUM 5000 UNIT(S): 5000 INJECTION INTRAVENOUS; SUBCUTANEOUS at 05:02

## 2019-03-07 RX ADMIN — Medication 4 UNIT(S): at 15:51

## 2019-03-07 RX ADMIN — HEPARIN SODIUM 5000 UNIT(S): 5000 INJECTION INTRAVENOUS; SUBCUTANEOUS at 17:15

## 2019-03-07 RX ADMIN — Medication 4 UNIT(S): at 08:15

## 2019-03-07 RX ADMIN — INSULIN GLARGINE 10 UNIT(S): 100 INJECTION, SOLUTION SUBCUTANEOUS at 08:15

## 2019-03-07 RX ADMIN — Medication 4: at 11:57

## 2019-03-07 RX ADMIN — Medication 500 MILLIGRAM(S): at 05:02

## 2019-03-07 RX ADMIN — Medication 4 UNIT(S): at 11:58

## 2019-03-07 RX ADMIN — Medication 500 MILLIGRAM(S): at 17:15

## 2019-03-07 NOTE — PROGRESS NOTE ADULT - ASSESSMENT
Uncontrolled Type 2 DM, Hgb A1c of 12.5% on this admission, complicated with DM foot ulcer.    - Continue Lantus 10 units qam and 7 units qhs.  - Continue Humalog to 4 units TID AC.  - On Moderate SSI AC/HS.  - Monitor finger sticks and will adjust accordingly.       Discharge Recommendations:  Lantus 15 units qhs.   Metformin 500 mg BID AC.  Follow up with our office in 1-2 weeks.
Uncontrolled Type 2 DM, Hgb A1c of 12.5% on this admission, complicated with DM foot ulcer.    Continue Lantus 10 units BID.  Continue Humalog 5 units TID AC.  On Moderate SSI AC/HS.  Monitor finger sticks and will adjust accordingly.
Uncontrolled Type 2 DM, Hgb A1c of 12.5% on this admission, complicated with DM foot ulcer.    Decrease Lantus 10 units qam and 7 units qhs.  Decrease Humalog to 4 units TID AC.  On Moderate SSI AC/HS.  Monitor finger sticks and will adjust accordingly.       Discharge Recommendations:  Lantus 15 units qhs.   Metformin 500 mg BID AC.  Follow up with our office in 1-2 weeks.
diabetes
diabetes uncontrolled
dm2 with hyperglycemia
diabetes uncontrolled

## 2019-03-07 NOTE — PROGRESS NOTE ADULT - PROBLEM SELECTOR PROBLEM 1
Type 2 diabetes mellitus with stage 4 chronic kidney disease, without long-term current use of insulin
Unsteady gait
Type 2 diabetes mellitus with stage 4 chronic kidney disease, without long-term current use of insulin
Unsteady gait
Unsteady gait
Type 2 diabetes mellitus with stage 4 chronic kidney disease, without long-term current use of insulin
Hyperglycemia

## 2019-03-07 NOTE — PROGRESS NOTE ADULT - PROBLEM SELECTOR PROBLEM 2
Type 2 diabetes mellitus with stage 4 chronic kidney disease, without long-term current use of insulin
Type 2 diabetes mellitus with stage 4 chronic kidney disease, without long-term current use of insulin
Ulcer of foot
BELIA (acute kidney injury)
BELIA (acute kidney injury)
Metabolic encephalopathy
Type 2 diabetes mellitus with stage 4 chronic kidney disease, without long-term current use of insulin
Ulcer of foot
Urinary tract infection without hematuria, site unspecified

## 2019-03-07 NOTE — PROGRESS NOTE ADULT - PROBLEM SELECTOR PLAN 2
RISS
cont Insulin
cont wound care
RISS
cont IVF
cont abx
cont abx
improving
resolved
resolved
wound care eval

## 2019-03-07 NOTE — PROGRESS NOTE ADULT - PROBLEM SELECTOR PLAN 1
can be discharged on Lantus 15 units at bed time and Lispro sliding scale coverage with meals   may need Insulin sensitizers added as out-patient   while inpatient Finger Sticks should be in 140-180 range, preferably <160
cont med
RISS
dc home since his insurance is not authorizing rehab
RISS
cont RISS
cont med
pt need rehab for unsteadiness, awaiting rehab
pt to go to rehab
Continue with the same regimen while inpatient   while inpatient Finger Sticks should be in 140-180 range, preferably <160   adjust dose of Lantus and / or prandial lispro to achieve target blood glucose range
Creatinine now normal   rec. Lantus 15 units and low dose Metformin as out-patient   Continue with the same regimen while inpatient
improved control  continue lantus 10units bid  continue lispro 5units with meals and KAILYN

## 2019-03-07 NOTE — PROGRESS NOTE ADULT - REASON FOR ADMISSION
change in mental status

## 2019-03-07 NOTE — PROGRESS NOTE ADULT - PROVIDER SPECIALTY LIST ADULT
Endocrinology
Internal Medicine

## 2019-03-07 NOTE — PROGRESS NOTE ADULT - SUBJECTIVE AND OBJECTIVE BOX
Patient is a 78y old  Male who presents with a chief complaint of change in mental status (06 Mar 2019 17:05)      INTERVAL HPI/OVERNIGHT EVENTS:  pt with no complaint  MEDICATIONS  (STANDING):  ciprofloxacin     Tablet 500 milliGRAM(s) Oral every 12 hours  dextrose 5%. 1000 milliLiter(s) (50 mL/Hr) IV Continuous <Continuous>  dextrose 50% Injectable 12.5 Gram(s) IV Push once  dextrose 50% Injectable 25 Gram(s) IV Push once  dextrose 50% Injectable 25 Gram(s) IV Push once  donepezil 10 milliGRAM(s) Oral at bedtime  heparin  Injectable 5000 Unit(s) SubCutaneous every 12 hours  influenza   Vaccine 0.5 milliLiter(s) IntraMuscular once  insulin glargine Injectable (LANTUS) 10 Unit(s) SubCutaneous every morning  insulin glargine Injectable (LANTUS) 7 Unit(s) SubCutaneous at bedtime  insulin lispro (HumaLOG) corrective regimen sliding scale   SubCutaneous three times a day before meals  insulin lispro Injectable (HumaLOG) 4 Unit(s) SubCutaneous three times a day before meals  lactated ringers. 1000 milliLiter(s) (100 mL/Hr) IV Continuous <Continuous>  tamsulosin 0.4 milliGRAM(s) Oral at bedtime    MEDICATIONS  (PRN):  aluminum hydroxide/magnesium hydroxide/simethicone Suspension 30 milliLiter(s) Oral every 6 hours PRN Dyspepsia  dextrose 40% Gel 15 Gram(s) Oral once PRN Blood Glucose LESS THAN 70 milliGRAM(s)/deciliter  glucagon  Injectable 1 milliGRAM(s) IntraMuscular once PRN Glucose LESS THAN 70 milligrams/deciliter  guaiFENesin    Syrup 200 milliGRAM(s) Oral every 6 hours PRN Cough      Allergies    penicillins (Unknown)    Intolerances        REVIEW OF SYSTEMS:  CONSTITUTIONAL: No fever, weight loss, or fatigue  EYES: No eye pain, visual disturbances, or discharge  ENMT:  No difficulty hearing, tinnitus, vertigo; No sinus or throat pain  NECK: No pain or stiffness  BREASTS: No pain, masses, or nipple discharge  RESPIRATORY: No cough, wheezing, chills or hemoptysis; No shortness of breath  CARDIOVASCULAR: No chest pain, palpitations, dizziness, or leg swelling  GASTROINTESTINAL: No abdominal or epigastric pain. No nausea, vomiting, or hematemesis; No diarrhea or constipation. No melena or hematochezia.  GENITOURINARY: No dysuria, frequency, hematuria, or incontinence  NEUROLOGICAL: No headaches, memory loss, loss of strength, numbness, or tremors  SKIN: No itching, burning, rashes, or lesions   LYMPH NODES: No enlarged glands  ENDOCRINE: No heat or cold intolerance; No hair loss  MUSCULOSKELETAL: No joint pain or swelling; No muscle, back, or extremity pain  PSYCHIATRIC: No depression, anxiety, mood swings, or difficulty sleeping  HEME/LYMPH: No easy bruising, or bleeding gums  ALLERGY AND IMMUNOLOGIC: No hives or eczema    Vital Signs Last 24 Hrs  T(C): 36.1 (07 Mar 2019 11:30), Max: 37.7 (06 Mar 2019 16:05)  T(F): 97 (07 Mar 2019 11:30), Max: 99.9 (06 Mar 2019 16:05)  HR: 74 (07 Mar 2019 11:30) (68 - 86)  BP: 144/78 (07 Mar 2019 11:30) (121/69 - 163/101)  BP(mean): --  RR: 20 (07 Mar 2019 11:30) (16 - 20)  SpO2: 99% (07 Mar 2019 11:14) (97% - 100%)    PHYSICAL EXAM:  GENERAL: NAD, well-groomed, well-developed  HEAD:  Atraumatic, Normocephalic  EYES: EOMI, PERRLA, conjunctiva and sclera clear  ENMT: No tonsillar erythema, exudates, or enlargement; Moist mucous membranes, Good dentition, No lesions  NECK: Supple, No JVD, Normal thyroid  NERVOUS SYSTEM:  Alert & Oriented X3, Good concentration; Motor Strength 5/5 B/L upper and lower extremities; DTRs 2+ intact and symmetric  CHEST/LUNG: Clear to percussion bilaterally; No rales, rhonchi, wheezing, or rubs  HEART: Regular rate and rhythm; No murmurs, rubs, or gallops  ABDOMEN: Soft, Nontender, Nondistended; Bowel sounds present  EXTREMITIES:  2+ Peripheral Pulses, No clubbing, cyanosis, or edema  LYMPH: No lymphadenopathy noted  SKIN: No rashes or lesions    LABS:              CAPILLARY BLOOD GLUCOSE      POCT Blood Glucose.: 249 mg/dL (07 Mar 2019 11:29)  POCT Blood Glucose.: 112 mg/dL (07 Mar 2019 07:52)  POCT Blood Glucose.: 152 mg/dL (06 Mar 2019 22:01)  POCT Blood Glucose.: 174 mg/dL (06 Mar 2019 16:05)    CULTURES:    HEMOGLOBIN A1C:    CHOLESTEROL:        RADIOLOGY & ADDITIONAL TESTS:
Patient is a 78y old  Male who presents with a chief complaint of change in mental status (27 Feb 2019 15:24)      INTERVAL HPI/OVERNIGHT EVENTS:  pt is doing better  MEDICATIONS  (STANDING):  ciprofloxacin   IVPB 200 milliGRAM(s) IV Intermittent every 12 hours  dextrose 5%. 1000 milliLiter(s) (50 mL/Hr) IV Continuous <Continuous>  dextrose 50% Injectable 12.5 Gram(s) IV Push once  dextrose 50% Injectable 25 Gram(s) IV Push once  dextrose 50% Injectable 25 Gram(s) IV Push once  donepezil 10 milliGRAM(s) Oral at bedtime  heparin  Injectable 5000 Unit(s) SubCutaneous every 12 hours  influenza   Vaccine 0.5 milliLiter(s) IntraMuscular once  insulin glargine Injectable (LANTUS) 10 Unit(s) SubCutaneous every morning  insulin glargine Injectable (LANTUS) 10 Unit(s) SubCutaneous at bedtime  insulin lispro (HumaLOG) corrective regimen sliding scale   SubCutaneous three times a day before meals  insulin lispro Injectable (HumaLOG) 5 Unit(s) SubCutaneous three times a day before meals  lactated ringers. 1000 milliLiter(s) (100 mL/Hr) IV Continuous <Continuous>  tamsulosin 0.4 milliGRAM(s) Oral at bedtime    MEDICATIONS  (PRN):  dextrose 40% Gel 15 Gram(s) Oral once PRN Blood Glucose LESS THAN 70 milliGRAM(s)/deciliter  glucagon  Injectable 1 milliGRAM(s) IntraMuscular once PRN Glucose LESS THAN 70 milligrams/deciliter  guaiFENesin    Syrup 200 milliGRAM(s) Oral every 6 hours PRN Cough      Allergies    penicillins (Unknown)    Intolerances        REVIEW OF SYSTEMS:  CONSTITUTIONAL: No fever, weight loss, or fatigue  EYES: No eye pain, visual disturbances, or discharge  ENMT:  No difficulty hearing, tinnitus, vertigo; No sinus or throat pain  NECK: No pain or stiffness  BREASTS: No pain, masses, or nipple discharge  RESPIRATORY: No cough, wheezing, chills or hemoptysis; No shortness of breath  CARDIOVASCULAR: No chest pain, palpitations, dizziness, or leg swelling  GASTROINTESTINAL: No abdominal or epigastric pain. No nausea, vomiting, or hematemesis; No diarrhea or constipation. No melena or hematochezia.  GENITOURINARY: No dysuria, frequency, hematuria, or incontinence  NEUROLOGICAL: No headaches, memory loss, loss of strength, numbness, or tremors  SKIN: No itching, burning, rashes, or lesions   LYMPH NODES: No enlarged glands  ENDOCRINE: No heat or cold intolerance; No hair loss  MUSCULOSKELETAL: No joint pain or swelling; No muscle, back, or extremity pain  PSYCHIATRIC: No depression, anxiety, mood swings, or difficulty sleeping  HEME/LYMPH: No easy bruising, or bleeding gums  ALLERGY AND IMMUNOLOGIC: No hives or eczema    Vital Signs Last 24 Hrs  T(C): 36.8 (27 Feb 2019 16:30), Max: 36.8 (27 Feb 2019 16:30)  T(F): 98.2 (27 Feb 2019 16:30), Max: 98.2 (27 Feb 2019 16:30)  HR: 90 (27 Feb 2019 16:30) (75 - 91)  BP: 151/80 (27 Feb 2019 16:30) (144/84 - 153/92)  BP(mean): --  RR: 19 (27 Feb 2019 16:30) (17 - 19)  SpO2: 99% (27 Feb 2019 16:30) (96% - 100%)    PHYSICAL EXAM:  GENERAL: NAD, well-groomed, well-developed  HEAD:  Atraumatic, Normocephalic  EYES: EOMI, PERRLA, conjunctiva and sclera clear  ENMT: No tonsillar erythema, exudates, or enlargement; Moist mucous membranes, Good dentition, No lesions  NECK: Supple, No JVD, Normal thyroid  NERVOUS SYSTEM:  Alert & Oriented X3, Good concentration; Motor Strength 5/5 B/L upper and lower extremities; DTRs 2+ intact and symmetric  CHEST/LUNG: Clear to percussion bilaterally; No rales, rhonchi, wheezing, or rubs  HEART: Regular rate and rhythm; No murmurs, rubs, or gallops  ABDOMEN: Soft, Nontender, Nondistended; Bowel sounds present  EXTREMITIES:  2+ Peripheral Pulses, No clubbing, cyanosis, or edema  LYMPH: No lymphadenopathy noted  SKIN: No rashes or lesions    LABS:                        9.9    7.66  )-----------( 361      ( 27 Feb 2019 07:29 )             29.1     02-27    135  |  103  |  18  ----------------------------<  145<H>  4.7   |  25  |  1.37<H>    Ca    8.8      27 Feb 2019 07:29          CAPILLARY BLOOD GLUCOSE      POCT Blood Glucose.: 197 mg/dL (27 Feb 2019 17:04)  POCT Blood Glucose.: 272 mg/dL (27 Feb 2019 11:29)  POCT Blood Glucose.: 172 mg/dL (27 Feb 2019 07:57)  POCT Blood Glucose.: 190 mg/dL (26 Feb 2019 21:53)    CULTURES:    HEMOGLOBIN A1C:    CHOLESTEROL:        RADIOLOGY & ADDITIONAL TESTS:
Patient is a 78y old  Male who presents with a chief complaint of change in mental status (01 Mar 2019 11:04)      INTERVAL HPI/OVERNIGHT EVENTS:  pt with no complaint,  MEDICATIONS  (STANDING):  ciprofloxacin   IVPB 200 milliGRAM(s) IV Intermittent every 12 hours  dextrose 5%. 1000 milliLiter(s) (50 mL/Hr) IV Continuous <Continuous>  dextrose 50% Injectable 12.5 Gram(s) IV Push once  dextrose 50% Injectable 25 Gram(s) IV Push once  dextrose 50% Injectable 25 Gram(s) IV Push once  donepezil 10 milliGRAM(s) Oral at bedtime  heparin  Injectable 5000 Unit(s) SubCutaneous every 12 hours  influenza   Vaccine 0.5 milliLiter(s) IntraMuscular once  insulin glargine Injectable (LANTUS) 10 Unit(s) SubCutaneous every morning  insulin glargine Injectable (LANTUS) 10 Unit(s) SubCutaneous at bedtime  insulin lispro (HumaLOG) corrective regimen sliding scale   SubCutaneous three times a day before meals  insulin lispro Injectable (HumaLOG) 5 Unit(s) SubCutaneous three times a day before meals  lactated ringers. 1000 milliLiter(s) (100 mL/Hr) IV Continuous <Continuous>  tamsulosin 0.4 milliGRAM(s) Oral at bedtime    MEDICATIONS  (PRN):  dextrose 40% Gel 15 Gram(s) Oral once PRN Blood Glucose LESS THAN 70 milliGRAM(s)/deciliter  glucagon  Injectable 1 milliGRAM(s) IntraMuscular once PRN Glucose LESS THAN 70 milligrams/deciliter  guaiFENesin    Syrup 200 milliGRAM(s) Oral every 6 hours PRN Cough      Allergies    penicillins (Unknown)    Intolerances        REVIEW OF SYSTEMS:  CONSTITUTIONAL: No fever, weight loss, or fatigue  EYES: No eye pain, visual disturbances, or discharge  ENMT:  No difficulty hearing, tinnitus, vertigo; No sinus or throat pain  NECK: No pain or stiffness  BREASTS: No pain, masses, or nipple discharge  RESPIRATORY: No cough, wheezing, chills or hemoptysis; No shortness of breath  CARDIOVASCULAR: No chest pain, palpitations, dizziness, or leg swelling  GASTROINTESTINAL: No abdominal or epigastric pain. No nausea, vomiting, or hematemesis; No diarrhea or constipation. No melena or hematochezia.  GENITOURINARY: No dysuria, frequency, hematuria, or incontinence  NEUROLOGICAL: No headaches, memory loss, loss of strength, numbness, or tremors  SKIN: No itching, burning, rashes, or lesions   LYMPH NODES: No enlarged glands  ENDOCRINE: No heat or cold intolerance; No hair loss  MUSCULOSKELETAL: No joint pain or swelling; No muscle, back, or extremity pain  PSYCHIATRIC: No depression, anxiety, mood swings, or difficulty sleeping  HEME/LYMPH: No easy bruising, or bleeding gums  ALLERGY AND IMMUNOLOGIC: No hives or eczema    Vital Signs Last 24 Hrs  T(C): 37.1 (01 Mar 2019 10:38), Max: 37.1 (28 Feb 2019 16:30)  T(F): 98.7 (01 Mar 2019 10:38), Max: 98.7 (28 Feb 2019 16:30)  HR: 99 (01 Mar 2019 10:38) (76 - 99)  BP: 159/95 (01 Mar 2019 10:38) (119/68 - 173/80)  BP(mean): --  RR: 18 (01 Mar 2019 10:38) (18 - 18)  SpO2: 99% (01 Mar 2019 10:38) (98% - 100%)    PHYSICAL EXAM:  GENERAL: NAD, well-groomed, well-developed  HEAD:  Atraumatic, Normocephalic  EYES: EOMI, PERRLA, conjunctiva and sclera clear  ENMT: No tonsillar erythema, exudates, or enlargement; Moist mucous membranes, Good dentition, No lesions  NECK: Supple, No JVD, Normal thyroid  NERVOUS SYSTEM:  Alert & Oriented X3, Good concentration; Motor Strength 5/5 B/L upper and lower extremities; DTRs 2+ intact and symmetric  CHEST/LUNG: Clear to percussion bilaterally; No rales, rhonchi, wheezing, or rubs  HEART: Regular rate and rhythm; No murmurs, rubs, or gallops  ABDOMEN: Soft, Nontender, Nondistended; Bowel sounds present  EXTREMITIES:  2+ Peripheral Pulses, No clubbing, cyanosis, or edema  LYMPH: No lymphadenopathy noted  SKIN: No rashes or lesions    LABS:              CAPILLARY BLOOD GLUCOSE      POCT Blood Glucose.: 100 mg/dL (01 Mar 2019 07:44)  POCT Blood Glucose.: 174 mg/dL (28 Feb 2019 22:02)  POCT Blood Glucose.: 189 mg/dL (28 Feb 2019 21:03)  POCT Blood Glucose.: 184 mg/dL (28 Feb 2019 15:59)    CULTURES:    HEMOGLOBIN A1C:    CHOLESTEROL:        RADIOLOGY & ADDITIONAL TESTS:
Patient is a 78y old  Male who presents with a chief complaint of change in mental status (01 Mar 2019 11:11)      INTERVAL HPI/OVERNIGHT EVENTS:  pt is doing better  MEDICATIONS  (STANDING):  ciprofloxacin   IVPB 200 milliGRAM(s) IV Intermittent every 12 hours  dextrose 5%. 1000 milliLiter(s) (50 mL/Hr) IV Continuous <Continuous>  dextrose 50% Injectable 12.5 Gram(s) IV Push once  dextrose 50% Injectable 25 Gram(s) IV Push once  dextrose 50% Injectable 25 Gram(s) IV Push once  donepezil 10 milliGRAM(s) Oral at bedtime  heparin  Injectable 5000 Unit(s) SubCutaneous every 12 hours  influenza   Vaccine 0.5 milliLiter(s) IntraMuscular once  insulin glargine Injectable (LANTUS) 10 Unit(s) SubCutaneous every morning  insulin glargine Injectable (LANTUS) 10 Unit(s) SubCutaneous at bedtime  insulin lispro (HumaLOG) corrective regimen sliding scale   SubCutaneous three times a day before meals  insulin lispro Injectable (HumaLOG) 5 Unit(s) SubCutaneous three times a day before meals  lactated ringers. 1000 milliLiter(s) (100 mL/Hr) IV Continuous <Continuous>  tamsulosin 0.4 milliGRAM(s) Oral at bedtime    MEDICATIONS  (PRN):  aluminum hydroxide/magnesium hydroxide/simethicone Suspension 30 milliLiter(s) Oral every 6 hours PRN Dyspepsia  dextrose 40% Gel 15 Gram(s) Oral once PRN Blood Glucose LESS THAN 70 milliGRAM(s)/deciliter  glucagon  Injectable 1 milliGRAM(s) IntraMuscular once PRN Glucose LESS THAN 70 milligrams/deciliter  guaiFENesin    Syrup 200 milliGRAM(s) Oral every 6 hours PRN Cough      Allergies    penicillins (Unknown)    Intolerances        REVIEW OF SYSTEMS:  CONSTITUTIONAL: No fever, weight loss, or fatigue  EYES: No eye pain, visual disturbances, or discharge  ENMT:  No difficulty hearing, tinnitus, vertigo; No sinus or throat pain  NECK: No pain or stiffness  BREASTS: No pain, masses, or nipple discharge  RESPIRATORY: No cough, wheezing, chills or hemoptysis; No shortness of breath  CARDIOVASCULAR: No chest pain, palpitations, dizziness, or leg swelling  GASTROINTESTINAL: No abdominal or epigastric pain. No nausea, vomiting, or hematemesis; No diarrhea or constipation. No melena or hematochezia.  GENITOURINARY: No dysuria, frequency, hematuria, or incontinence  NEUROLOGICAL: No headaches, memory loss, loss of strength, numbness, or tremors  SKIN: No itching, burning, rashes, or lesions   LYMPH NODES: No enlarged glands  ENDOCRINE: No heat or cold intolerance; No hair loss  MUSCULOSKELETAL: No joint pain or swelling; No muscle, back, or extremity pain  PSYCHIATRIC: No depression, anxiety, mood swings, or difficulty sleeping  HEME/LYMPH: No easy bruising, or bleeding gums  ALLERGY AND IMMUNOLOGIC: No hives or eczema    Vital Signs Last 24 Hrs  T(C): 36.7 (02 Mar 2019 04:56), Max: 37.2 (01 Mar 2019 16:26)  T(F): 98.1 (02 Mar 2019 04:56), Max: 98.9 (01 Mar 2019 16:26)  HR: 77 (02 Mar 2019 04:56) (77 - 99)  BP: 127/62 (02 Mar 2019 04:56) (127/62 - 165/97)  BP(mean): --  RR: 17 (02 Mar 2019 04:56) (17 - 19)  SpO2: 91% (02 Mar 2019 04:56) (91% - 99%)    PHYSICAL EXAM:  GENERAL: NAD, well-groomed, well-developed  HEAD:  Atraumatic, Normocephalic  EYES: EOMI, PERRLA, conjunctiva and sclera clear  ENMT: No tonsillar erythema, exudates, or enlargement; Moist mucous membranes, Good dentition, No lesions  NECK: Supple, No JVD, Normal thyroid  NERVOUS SYSTEM:  Alert & Oriented X3, Good concentration; Motor Strength 5/5 B/L upper and lower extremities; DTRs 2+ intact and symmetric  CHEST/LUNG: Clear to percussion bilaterally; No rales, rhonchi, wheezing, or rubs  HEART: Regular rate and rhythm; No murmurs, rubs, or gallops  ABDOMEN: Soft, Nontender, Nondistended; Bowel sounds present  EXTREMITIES:  2+ Peripheral Pulses, No clubbing, cyanosis, or edema  LYMPH: No lymphadenopathy noted  SKIN: No rashes or lesions    LABS:              CAPILLARY BLOOD GLUCOSE      POCT Blood Glucose.: 116 mg/dL (02 Mar 2019 07:39)  POCT Blood Glucose.: 154 mg/dL (01 Mar 2019 21:47)  POCT Blood Glucose.: 196 mg/dL (01 Mar 2019 17:02)  POCT Blood Glucose.: 217 mg/dL (01 Mar 2019 11:24)    CULTURES:    HEMOGLOBIN A1C:    CHOLESTEROL:        RADIOLOGY & ADDITIONAL TESTS:
Patient is a 78y old  Male who presents with a chief complaint of change in mental status (02 Mar 2019 09:21)      INTERVAL HPI/OVERNIGHT EVENTS:  pt NAD  good appetite  eating well    MEDICATIONS  (STANDING):  ciprofloxacin   IVPB 200 milliGRAM(s) IV Intermittent every 12 hours  dextrose 5%. 1000 milliLiter(s) (50 mL/Hr) IV Continuous <Continuous>  dextrose 50% Injectable 12.5 Gram(s) IV Push once  dextrose 50% Injectable 25 Gram(s) IV Push once  dextrose 50% Injectable 25 Gram(s) IV Push once  donepezil 10 milliGRAM(s) Oral at bedtime  heparin  Injectable 5000 Unit(s) SubCutaneous every 12 hours  influenza   Vaccine 0.5 milliLiter(s) IntraMuscular once  insulin glargine Injectable (LANTUS) 10 Unit(s) SubCutaneous every morning  insulin glargine Injectable (LANTUS) 10 Unit(s) SubCutaneous at bedtime  insulin lispro (HumaLOG) corrective regimen sliding scale   SubCutaneous three times a day before meals  insulin lispro Injectable (HumaLOG) 5 Unit(s) SubCutaneous three times a day before meals  lactated ringers. 1000 milliLiter(s) (100 mL/Hr) IV Continuous <Continuous>  tamsulosin 0.4 milliGRAM(s) Oral at bedtime    MEDICATIONS  (PRN):  aluminum hydroxide/magnesium hydroxide/simethicone Suspension 30 milliLiter(s) Oral every 6 hours PRN Dyspepsia  dextrose 40% Gel 15 Gram(s) Oral once PRN Blood Glucose LESS THAN 70 milliGRAM(s)/deciliter  glucagon  Injectable 1 milliGRAM(s) IntraMuscular once PRN Glucose LESS THAN 70 milligrams/deciliter  guaiFENesin    Syrup 200 milliGRAM(s) Oral every 6 hours PRN Cough      REVIEW OF SYSTEMS:  CONSTITUTIONAL: No fever, weight loss, or fatigue  RESPIRATORY: No cough, wheezing, chills or hemoptysis; No shortness of breath  CARDIOVASCULAR: No chest pain, palpitations, dizziness, or leg swelling  GASTROINTESTINAL: No abdominal or epigastric pain. No nausea, vomiting, or hematemesis; No diarrhea or constipation. No melena or hematochezia.  ENDOCRINE: No heat or cold intolerance; No hair loss      Vital Signs Last 24 Hrs  T(C): 36.7 (02 Mar 2019 04:56), Max: 37.2 (01 Mar 2019 16:26)  T(F): 98.1 (02 Mar 2019 04:56), Max: 98.9 (01 Mar 2019 16:26)  HR: 77 (02 Mar 2019 04:56) (77 - 99)  BP: 127/62 (02 Mar 2019 04:56) (127/62 - 165/97)  BP(mean): --  RR: 17 (02 Mar 2019 04:56) (17 - 19)  SpO2: 91% (02 Mar 2019 04:56) (91% - 99%)    PHYSICAL EXAM:  GENERAL: NAD, well-groomed, well-developed      LABS:              CAPILLARY BLOOD GLUCOSE      POCT Blood Glucose.: 116 mg/dL (02 Mar 2019 07:39)  POCT Blood Glucose.: 154 mg/dL (01 Mar 2019 21:47)  POCT Blood Glucose.: 196 mg/dL (01 Mar 2019 17:02)  POCT Blood Glucose.: 217 mg/dL (01 Mar 2019 11:24)    Lipid panel:               RADIOLOGY & ADDITIONAL TESTS:
Patient is a 78y old  Male who presents with a chief complaint of change in mental status (02 Mar 2019 09:31)      INTERVAL HPI/OVERNIGHT EVENTS:    MEDICATIONS  (STANDING):  ciprofloxacin   IVPB 200 milliGRAM(s) IV Intermittent every 12 hours  dextrose 5%. 1000 milliLiter(s) (50 mL/Hr) IV Continuous <Continuous>  dextrose 50% Injectable 12.5 Gram(s) IV Push once  dextrose 50% Injectable 25 Gram(s) IV Push once  dextrose 50% Injectable 25 Gram(s) IV Push once  donepezil 10 milliGRAM(s) Oral at bedtime  heparin  Injectable 5000 Unit(s) SubCutaneous every 12 hours  influenza   Vaccine 0.5 milliLiter(s) IntraMuscular once  insulin glargine Injectable (LANTUS) 10 Unit(s) SubCutaneous every morning  insulin glargine Injectable (LANTUS) 10 Unit(s) SubCutaneous at bedtime  insulin lispro (HumaLOG) corrective regimen sliding scale   SubCutaneous three times a day before meals  insulin lispro Injectable (HumaLOG) 5 Unit(s) SubCutaneous three times a day before meals  lactated ringers. 1000 milliLiter(s) (100 mL/Hr) IV Continuous <Continuous>  tamsulosin 0.4 milliGRAM(s) Oral at bedtime    MEDICATIONS  (PRN):  aluminum hydroxide/magnesium hydroxide/simethicone Suspension 30 milliLiter(s) Oral every 6 hours PRN Dyspepsia  dextrose 40% Gel 15 Gram(s) Oral once PRN Blood Glucose LESS THAN 70 milliGRAM(s)/deciliter  glucagon  Injectable 1 milliGRAM(s) IntraMuscular once PRN Glucose LESS THAN 70 milligrams/deciliter  guaiFENesin    Syrup 200 milliGRAM(s) Oral every 6 hours PRN Cough      REVIEW OF SYSTEMS:  CONSTITUTIONAL: No fever, weight loss, or fatigue  RESPIRATORY: No cough, wheezing, chills or hemoptysis; No shortness of breath  CARDIOVASCULAR: No chest pain, palpitations, dizziness, or leg swelling  GASTROINTESTINAL: No abdominal or epigastric pain. No nausea, vomiting, or hematemesis; No diarrhea or constipation. No melena or hematochezia.  ENDOCRINE: No heat or cold intolerance; No hair loss      Vital Signs Last 24 Hrs  T(C): 36.8 (03 Mar 2019 04:44), Max: 37.1 (02 Mar 2019 16:18)  T(F): 98.2 (03 Mar 2019 04:44), Max: 98.7 (02 Mar 2019 16:18)  HR: 68 (03 Mar 2019 04:44) (68 - 89)  BP: 151/78 (03 Mar 2019 04:44) (111/78 - 166/94)  BP(mean): --  RR: 19 (03 Mar 2019 04:44) (16 - 19)  SpO2: 98% (03 Mar 2019 04:44) (95% - 100%)    PHYSICAL EXAM:  GENERAL: NAD, well-groomed, well-developed      CAPILLARY BLOOD GLUCOSE      POCT Blood Glucose.: 108 mg/dL (03 Mar 2019 08:29)  POCT Blood Glucose.: 226 mg/dL (02 Mar 2019 21:38)  POCT Blood Glucose.: 123 mg/dL (02 Mar 2019 17:19)  POCT Blood Glucose.: 180 mg/dL (02 Mar 2019 15:44)  POCT Blood Glucose.: 270 mg/dL (02 Mar 2019 13:02)  POCT Blood Glucose.: 266 mg/dL (02 Mar 2019 11:05)
Patient is a 78y old  Male who presents with a chief complaint of change in mental status (03 Mar 2019 10:08)      INTERVAL HPI/OVERNIGHT EVENTS:  pt with no complaint   MEDICATIONS  (STANDING):  ciprofloxacin   IVPB 200 milliGRAM(s) IV Intermittent every 12 hours  dextrose 5%. 1000 milliLiter(s) (50 mL/Hr) IV Continuous <Continuous>  dextrose 50% Injectable 12.5 Gram(s) IV Push once  dextrose 50% Injectable 25 Gram(s) IV Push once  dextrose 50% Injectable 25 Gram(s) IV Push once  donepezil 10 milliGRAM(s) Oral at bedtime  heparin  Injectable 5000 Unit(s) SubCutaneous every 12 hours  influenza   Vaccine 0.5 milliLiter(s) IntraMuscular once  insulin glargine Injectable (LANTUS) 10 Unit(s) SubCutaneous every morning  insulin glargine Injectable (LANTUS) 10 Unit(s) SubCutaneous at bedtime  insulin lispro (HumaLOG) corrective regimen sliding scale   SubCutaneous three times a day before meals  insulin lispro Injectable (HumaLOG) 5 Unit(s) SubCutaneous three times a day before meals  lactated ringers. 1000 milliLiter(s) (100 mL/Hr) IV Continuous <Continuous>  tamsulosin 0.4 milliGRAM(s) Oral at bedtime    MEDICATIONS  (PRN):  aluminum hydroxide/magnesium hydroxide/simethicone Suspension 30 milliLiter(s) Oral every 6 hours PRN Dyspepsia  dextrose 40% Gel 15 Gram(s) Oral once PRN Blood Glucose LESS THAN 70 milliGRAM(s)/deciliter  glucagon  Injectable 1 milliGRAM(s) IntraMuscular once PRN Glucose LESS THAN 70 milligrams/deciliter  guaiFENesin    Syrup 200 milliGRAM(s) Oral every 6 hours PRN Cough      Allergies    penicillins (Unknown)    Intolerances        REVIEW OF SYSTEMS:  CONSTITUTIONAL: No fever, weight loss, or fatigue  EYES: No eye pain, visual disturbances, or discharge  ENMT:  No difficulty hearing, tinnitus, vertigo; No sinus or throat pain  NECK: No pain or stiffness  BREASTS: No pain, masses, or nipple discharge  RESPIRATORY: No cough, wheezing, chills or hemoptysis; No shortness of breath  CARDIOVASCULAR: No chest pain, palpitations, dizziness, or leg swelling  GASTROINTESTINAL: No abdominal or epigastric pain. No nausea, vomiting, or hematemesis; No diarrhea or constipation. No melena or hematochezia.  GENITOURINARY: No dysuria, frequency, hematuria, or incontinence  NEUROLOGICAL: No headaches, memory loss, loss of strength, numbness, or tremors  SKIN: No itching, burning, rashes, or lesions   LYMPH NODES: No enlarged glands  ENDOCRINE: No heat or cold intolerance; No hair loss  MUSCULOSKELETAL: No joint pain or swelling; No muscle, back, or extremity pain  PSYCHIATRIC: No depression, anxiety, mood swings, or difficulty sleeping  HEME/LYMPH: No easy bruising, or bleeding gums  ALLERGY AND IMMUNOLOGIC: No hives or eczema    Vital Signs Last 24 Hrs  T(C): 36.8 (03 Mar 2019 04:44), Max: 37.1 (02 Mar 2019 16:18)  T(F): 98.2 (03 Mar 2019 04:44), Max: 98.7 (02 Mar 2019 16:18)  HR: 68 (03 Mar 2019 04:44) (68 - 89)  BP: 151/78 (03 Mar 2019 04:44) (111/78 - 166/94)  BP(mean): --  RR: 19 (03 Mar 2019 04:44) (16 - 19)  SpO2: 98% (03 Mar 2019 04:44) (95% - 100%)    PHYSICAL EXAM:  GENERAL: NAD, well-groomed, well-developed  HEAD:  Atraumatic, Normocephalic  EYES: EOMI, PERRLA, conjunctiva and sclera clear  ENMT: No tonsillar erythema, exudates, or enlargement; Moist mucous membranes, Good dentition, No lesions  NECK: Supple, No JVD, Normal thyroid  NERVOUS SYSTEM:  Alert & Oriented X3, Good concentration; Motor Strength 5/5 B/L upper and lower extremities; DTRs 2+ intact and symmetric  CHEST/LUNG: Clear to percussion bilaterally; No rales, rhonchi, wheezing, or rubs  HEART: Regular rate and rhythm; No murmurs, rubs, or gallops  ABDOMEN: Soft, Nontender, Nondistended; Bowel sounds present  EXTREMITIES:  2+ Peripheral Pulses, No clubbing, cyanosis, or edema  LYMPH: No lymphadenopathy noted  SKIN: No rashes or lesions    LABS:              CAPILLARY BLOOD GLUCOSE      POCT Blood Glucose.: 108 mg/dL (03 Mar 2019 08:29)  POCT Blood Glucose.: 226 mg/dL (02 Mar 2019 21:38)  POCT Blood Glucose.: 123 mg/dL (02 Mar 2019 17:19)  POCT Blood Glucose.: 180 mg/dL (02 Mar 2019 15:44)  POCT Blood Glucose.: 270 mg/dL (02 Mar 2019 13:02)    CULTURES:    HEMOGLOBIN A1C:    CHOLESTEROL:        RADIOLOGY & ADDITIONAL TESTS:
Patient is a 78y old  Male who presents with a chief complaint of change in mental status (03 Mar 2019 11:05)      INTERVAL HPI/OVERNIGHT EVENTS:    Pt is resting.   Denies of new complaints.   BG are fairly controlled.  No acute events overnight.   No hypoglycemic episodes.     MEDICATIONS  (STANDING):  ciprofloxacin     Tablet 500 milliGRAM(s) Oral every 12 hours  dextrose 5%. 1000 milliLiter(s) (50 mL/Hr) IV Continuous <Continuous>  dextrose 50% Injectable 12.5 Gram(s) IV Push once  dextrose 50% Injectable 25 Gram(s) IV Push once  dextrose 50% Injectable 25 Gram(s) IV Push once  donepezil 10 milliGRAM(s) Oral at bedtime  heparin  Injectable 5000 Unit(s) SubCutaneous every 12 hours  influenza   Vaccine 0.5 milliLiter(s) IntraMuscular once  insulin glargine Injectable (LANTUS) 10 Unit(s) SubCutaneous every morning  insulin glargine Injectable (LANTUS) 10 Unit(s) SubCutaneous at bedtime  insulin lispro (HumaLOG) corrective regimen sliding scale   SubCutaneous three times a day before meals  insulin lispro Injectable (HumaLOG) 5 Unit(s) SubCutaneous three times a day before meals  lactated ringers. 1000 milliLiter(s) (100 mL/Hr) IV Continuous <Continuous>  tamsulosin 0.4 milliGRAM(s) Oral at bedtime    MEDICATIONS  (PRN):  aluminum hydroxide/magnesium hydroxide/simethicone Suspension 30 milliLiter(s) Oral every 6 hours PRN Dyspepsia  dextrose 40% Gel 15 Gram(s) Oral once PRN Blood Glucose LESS THAN 70 milliGRAM(s)/deciliter  glucagon  Injectable 1 milliGRAM(s) IntraMuscular once PRN Glucose LESS THAN 70 milligrams/deciliter  guaiFENesin    Syrup 200 milliGRAM(s) Oral every 6 hours PRN Cough      REVIEW OF SYSTEMS:  CONSTITUTIONAL: No fever, weight loss, or fatigue  RESPIRATORY: No cough, wheezing, chills or hemoptysis; No shortness of breath  CARDIOVASCULAR: No chest pain, palpitations, dizziness, or leg swelling  GASTROINTESTINAL: No abdominal or epigastric pain. No nausea, vomiting, or hematemesis; No diarrhea or constipation. No melena or hematochezia.  ENDOCRINE: No heat or cold intolerance; No hair loss      Vital Signs Last 24 Hrs  T(C): 36.5 (04 Mar 2019 04:57), Max: 37.1 (03 Mar 2019 16:18)  T(F): 97.7 (04 Mar 2019 04:57), Max: 98.8 (03 Mar 2019 16:18)  HR: 79 (04 Mar 2019 04:57) (77 - 79)  BP: 134/79 (04 Mar 2019 04:57) (134/79 - 155/82)  BP(mean): --  RR: 18 (04 Mar 2019 04:57) (16 - 18)  SpO2: 99% (04 Mar 2019 04:57) (97% - 99%)    PHYSICAL EXAM:  GENERAL: NAD, well-groomed, well-developed  CHEST/LUNG: Clear to percussion bilaterally; No rales, rhonchi, wheezing, or rubs  HEART: Regular rate and rhythm; No murmurs, rubs, or gallops  ABDOMEN: Soft, Nontender, Nondistended; Bowel sounds present  EXTREMITIES:  2+ Peripheral Pulses, No clubbing, cyanosis, or edema      LABS:                        10.0   8.37  )-----------( 353      ( 04 Mar 2019 07:53 )             28.6     03-04    136  |  102  |  19  ----------------------------<  98  4.4   |  28  |  1.27    Ca    8.6      04 Mar 2019 07:53          CAPILLARY BLOOD GLUCOSE      POCT Blood Glucose.: 120 mg/dL (04 Mar 2019 07:47)  POCT Blood Glucose.: 185 mg/dL (03 Mar 2019 22:54)  POCT Blood Glucose.: 162 mg/dL (03 Mar 2019 15:53)  POCT Blood Glucose.: 211 mg/dL (03 Mar 2019 12:37)
Patient is a 78y old  Male who presents with a chief complaint of change in mental status (04 Mar 2019 08:45)      INTERVAL HPI/OVERNIGHT EVENTS:  Pt is resting.   Tolerating meals.   BG in the 80's this am.     MEDICATIONS  (STANDING):  ciprofloxacin     Tablet 500 milliGRAM(s) Oral every 12 hours  dextrose 5%. 1000 milliLiter(s) (50 mL/Hr) IV Continuous <Continuous>  dextrose 50% Injectable 12.5 Gram(s) IV Push once  dextrose 50% Injectable 25 Gram(s) IV Push once  dextrose 50% Injectable 25 Gram(s) IV Push once  donepezil 10 milliGRAM(s) Oral at bedtime  heparin  Injectable 5000 Unit(s) SubCutaneous every 12 hours  influenza   Vaccine 0.5 milliLiter(s) IntraMuscular once  insulin glargine Injectable (LANTUS) 10 Unit(s) SubCutaneous every morning  insulin glargine Injectable (LANTUS) 7 Unit(s) SubCutaneous at bedtime  insulin lispro (HumaLOG) corrective regimen sliding scale   SubCutaneous three times a day before meals  insulin lispro Injectable (HumaLOG) 4 Unit(s) SubCutaneous three times a day before meals  lactated ringers. 1000 milliLiter(s) (100 mL/Hr) IV Continuous <Continuous>  tamsulosin 0.4 milliGRAM(s) Oral at bedtime    MEDICATIONS  (PRN):  aluminum hydroxide/magnesium hydroxide/simethicone Suspension 30 milliLiter(s) Oral every 6 hours PRN Dyspepsia  dextrose 40% Gel 15 Gram(s) Oral once PRN Blood Glucose LESS THAN 70 milliGRAM(s)/deciliter  glucagon  Injectable 1 milliGRAM(s) IntraMuscular once PRN Glucose LESS THAN 70 milligrams/deciliter  guaiFENesin    Syrup 200 milliGRAM(s) Oral every 6 hours PRN Cough      REVIEW OF SYSTEMS:  CONSTITUTIONAL: No fever, weight loss, or fatigue  RESPIRATORY: No cough, wheezing, chills or hemoptysis; No shortness of breath  CARDIOVASCULAR: No chest pain, palpitations, dizziness, or leg swelling  GASTROINTESTINAL: No abdominal or epigastric pain. No nausea, vomiting, or hematemesis; No diarrhea or constipation. No melena or hematochezia.  ENDOCRINE: No heat or cold intolerance; No hair loss      Vital Signs Last 24 Hrs  T(C): 36.4 (05 Mar 2019 05:03), Max: 36.9 (04 Mar 2019 12:28)  T(F): 97.5 (05 Mar 2019 05:03), Max: 98.5 (04 Mar 2019 12:28)  HR: 80 (05 Mar 2019 06:18) (77 - 95)  BP: 160/85 (05 Mar 2019 06:18) (129/70 - 166/80)  BP(mean): --  RR: 18 (05 Mar 2019 05:03) (18 - 18)  SpO2: 97% (05 Mar 2019 05:03) (92% - 98%)    PHYSICAL EXAM:  GENERAL: NAD, well-groomed, well-developed  CHEST/LUNG: Clear to percussion bilaterally; No rales, rhonchi, wheezing, or rubs  HEART: Regular rate and rhythm; No murmurs, rubs, or gallops  ABDOMEN: Soft, Nontender, Nondistended; Bowel sounds present  EXTREMITIES:  2+ Peripheral Pulses, No clubbing, cyanosis, or edema      LABS:                        10.0   8.37  )-----------( 353      ( 04 Mar 2019 07:53 )             28.6     03-04    136  |  102  |  19  ----------------------------<  98  4.4   |  28  |  1.27    Ca    8.6      04 Mar 2019 07:53          CAPILLARY BLOOD GLUCOSE      POCT Blood Glucose.: 85 mg/dL (05 Mar 2019 08:51)  POCT Blood Glucose.: 89 mg/dL (05 Mar 2019 07:48)  POCT Blood Glucose.: 160 mg/dL (04 Mar 2019 22:00)  POCT Blood Glucose.: 86 mg/dL (04 Mar 2019 17:07)  POCT Blood Glucose.: 211 mg/dL (04 Mar 2019 11:29)
Patient is a 78y old  Male who presents with a chief complaint of change in mental status (05 Mar 2019 10:18)      INTERVAL HPI/OVERNIGHT EVENTS:  pt with no complaint  MEDICATIONS  (STANDING):  ciprofloxacin     Tablet 500 milliGRAM(s) Oral every 12 hours  dextrose 5%. 1000 milliLiter(s) (50 mL/Hr) IV Continuous <Continuous>  dextrose 50% Injectable 12.5 Gram(s) IV Push once  dextrose 50% Injectable 25 Gram(s) IV Push once  dextrose 50% Injectable 25 Gram(s) IV Push once  donepezil 10 milliGRAM(s) Oral at bedtime  heparin  Injectable 5000 Unit(s) SubCutaneous every 12 hours  influenza   Vaccine 0.5 milliLiter(s) IntraMuscular once  insulin glargine Injectable (LANTUS) 10 Unit(s) SubCutaneous every morning  insulin glargine Injectable (LANTUS) 7 Unit(s) SubCutaneous at bedtime  insulin lispro (HumaLOG) corrective regimen sliding scale   SubCutaneous three times a day before meals  insulin lispro Injectable (HumaLOG) 4 Unit(s) SubCutaneous three times a day before meals  lactated ringers. 1000 milliLiter(s) (100 mL/Hr) IV Continuous <Continuous>  tamsulosin 0.4 milliGRAM(s) Oral at bedtime    MEDICATIONS  (PRN):  aluminum hydroxide/magnesium hydroxide/simethicone Suspension 30 milliLiter(s) Oral every 6 hours PRN Dyspepsia  dextrose 40% Gel 15 Gram(s) Oral once PRN Blood Glucose LESS THAN 70 milliGRAM(s)/deciliter  glucagon  Injectable 1 milliGRAM(s) IntraMuscular once PRN Glucose LESS THAN 70 milligrams/deciliter  guaiFENesin    Syrup 200 milliGRAM(s) Oral every 6 hours PRN Cough      Allergies    penicillins (Unknown)    Intolerances        REVIEW OF SYSTEMS:  CONSTITUTIONAL: No fever, weight loss, or fatigue  EYES: No eye pain, visual disturbances, or discharge  ENMT:  No difficulty hearing, tinnitus, vertigo; No sinus or throat pain  NECK: No pain or stiffness  BREASTS: No pain, masses, or nipple discharge  RESPIRATORY: No cough, wheezing, chills or hemoptysis; No shortness of breath  CARDIOVASCULAR: No chest pain, palpitations, dizziness, or leg swelling  GASTROINTESTINAL: No abdominal or epigastric pain. No nausea, vomiting, or hematemesis; No diarrhea or constipation. No melena or hematochezia.  GENITOURINARY: No dysuria, frequency, hematuria, or incontinence  NEUROLOGICAL: No headaches, memory loss, loss of strength, numbness, or tremors  SKIN: No itching, burning, rashes, or lesions   LYMPH NODES: No enlarged glands  ENDOCRINE: No heat or cold intolerance; No hair loss  MUSCULOSKELETAL: No joint pain or swelling; No muscle, back, or extremity pain  PSYCHIATRIC: No depression, anxiety, mood swings, or difficulty sleeping  HEME/LYMPH: No easy bruising, or bleeding gums  ALLERGY AND IMMUNOLOGIC: No hives or eczema    Vital Signs Last 24 Hrs  T(C): 36.4 (05 Mar 2019 05:03), Max: 36.9 (04 Mar 2019 12:28)  T(F): 97.5 (05 Mar 2019 05:03), Max: 98.5 (04 Mar 2019 12:28)  HR: 80 (05 Mar 2019 06:18) (77 - 95)  BP: 160/85 (05 Mar 2019 06:18) (129/70 - 166/80)  BP(mean): --  RR: 18 (05 Mar 2019 05:03) (18 - 18)  SpO2: 97% (05 Mar 2019 05:03) (92% - 98%)    PHYSICAL EXAM:  GENERAL: NAD, well-groomed, well-developed  HEAD:  Atraumatic, Normocephalic  EYES: EOMI, PERRLA, conjunctiva and sclera clear  ENMT: No tonsillar erythema, exudates, or enlargement; Moist mucous membranes, Good dentition, No lesions  NECK: Supple, No JVD, Normal thyroid  NERVOUS SYSTEM:  Alert & Oriented X3, Good concentration; Motor Strength 5/5 B/L upper and lower extremities; DTRs 2+ intact and symmetric  CHEST/LUNG: Clear to percussion bilaterally; No rales, rhonchi, wheezing, or rubs  HEART: Regular rate and rhythm; No murmurs, rubs, or gallops  ABDOMEN: Soft, Nontender, Nondistended; Bowel sounds present  EXTREMITIES:  2+ Peripheral Pulses, No clubbing, cyanosis, or edema  LYMPH: No lymphadenopathy noted  SKIN: No rashes or lesions    LABS:                        10.0   8.37  )-----------( 353      ( 04 Mar 2019 07:53 )             28.6     03-04    136  |  102  |  19  ----------------------------<  98  4.4   |  28  |  1.27    Ca    8.6      04 Mar 2019 07:53          CAPILLARY BLOOD GLUCOSE      POCT Blood Glucose.: 85 mg/dL (05 Mar 2019 08:51)  POCT Blood Glucose.: 89 mg/dL (05 Mar 2019 07:48)  POCT Blood Glucose.: 160 mg/dL (04 Mar 2019 22:00)  POCT Blood Glucose.: 86 mg/dL (04 Mar 2019 17:07)  POCT Blood Glucose.: 211 mg/dL (04 Mar 2019 11:29)    CULTURES:    HEMOGLOBIN A1C:    CHOLESTEROL:        RADIOLOGY & ADDITIONAL TESTS:
Patient is a 78y old  Male who presents with a chief complaint of change in mental status (05 Mar 2019 10:45)      INTERVAL HPI/OVERNIGHT EVENTS:  pt with no complaint  MEDICATIONS  (STANDING):  ciprofloxacin     Tablet 500 milliGRAM(s) Oral every 12 hours  dextrose 5%. 1000 milliLiter(s) (50 mL/Hr) IV Continuous <Continuous>  dextrose 50% Injectable 12.5 Gram(s) IV Push once  dextrose 50% Injectable 25 Gram(s) IV Push once  dextrose 50% Injectable 25 Gram(s) IV Push once  donepezil 10 milliGRAM(s) Oral at bedtime  heparin  Injectable 5000 Unit(s) SubCutaneous every 12 hours  influenza   Vaccine 0.5 milliLiter(s) IntraMuscular once  insulin glargine Injectable (LANTUS) 10 Unit(s) SubCutaneous every morning  insulin glargine Injectable (LANTUS) 7 Unit(s) SubCutaneous at bedtime  insulin lispro (HumaLOG) corrective regimen sliding scale   SubCutaneous three times a day before meals  insulin lispro Injectable (HumaLOG) 4 Unit(s) SubCutaneous three times a day before meals  lactated ringers. 1000 milliLiter(s) (100 mL/Hr) IV Continuous <Continuous>  tamsulosin 0.4 milliGRAM(s) Oral at bedtime    MEDICATIONS  (PRN):  aluminum hydroxide/magnesium hydroxide/simethicone Suspension 30 milliLiter(s) Oral every 6 hours PRN Dyspepsia  dextrose 40% Gel 15 Gram(s) Oral once PRN Blood Glucose LESS THAN 70 milliGRAM(s)/deciliter  glucagon  Injectable 1 milliGRAM(s) IntraMuscular once PRN Glucose LESS THAN 70 milligrams/deciliter  guaiFENesin    Syrup 200 milliGRAM(s) Oral every 6 hours PRN Cough      Allergies    penicillins (Unknown)    Intolerances        REVIEW OF SYSTEMS:  CONSTITUTIONAL: No fever, weight loss, or fatigue  EYES: No eye pain, visual disturbances, or discharge  ENMT:  No difficulty hearing, tinnitus, vertigo; No sinus or throat pain  NECK: No pain or stiffness  BREASTS: No pain, masses, or nipple discharge  RESPIRATORY: No cough, wheezing, chills or hemoptysis; No shortness of breath  CARDIOVASCULAR: No chest pain, palpitations, dizziness, or leg swelling  GASTROINTESTINAL: No abdominal or epigastric pain. No nausea, vomiting, or hematemesis; No diarrhea or constipation. No melena or hematochezia.  GENITOURINARY: No dysuria, frequency, hematuria, or incontinence  NEUROLOGICAL: No headaches, memory loss, loss of strength, numbness, or tremors  SKIN: No itching, burning, rashes, or lesions   LYMPH NODES: No enlarged glands  ENDOCRINE: No heat or cold intolerance; No hair loss  MUSCULOSKELETAL: No joint pain or swelling; No muscle, back, or extremity pain  PSYCHIATRIC: No depression, anxiety, mood swings, or difficulty sleeping  HEME/LYMPH: No easy bruising, or bleeding gums  ALLERGY AND IMMUNOLOGIC: No hives or eczema    Vital Signs Last 24 Hrs  T(C): 36.7 (06 Mar 2019 05:27), Max: 36.9 (05 Mar 2019 23:20)  T(F): 98 (06 Mar 2019 05:27), Max: 98.4 (05 Mar 2019 23:20)  HR: 82 (06 Mar 2019 05:27) (78 - 89)  BP: 130/83 (06 Mar 2019 05:27) (130/76 - 142/80)  BP(mean): --  RR: 17 (06 Mar 2019 05:27) (17 - 18)  SpO2: 97% (06 Mar 2019 05:27) (95% - 97%)    PHYSICAL EXAM:  GENERAL: NAD, well-groomed, well-developed  HEAD:  Atraumatic, Normocephalic  EYES: EOMI, PERRLA, conjunctiva and sclera clear  ENMT: No tonsillar erythema, exudates, or enlargement; Moist mucous membranes, Good dentition, No lesions  NECK: Supple, No JVD, Normal thyroid  NERVOUS SYSTEM:  Alert & Oriented X3, Good concentration; Motor Strength 5/5 B/L upper and lower extremities; DTRs 2+ intact and symmetric  CHEST/LUNG: Clear to percussion bilaterally; No rales, rhonchi, wheezing, or rubs  HEART: Regular rate and rhythm; No murmurs, rubs, or gallops  ABDOMEN: Soft, Nontender, Nondistended; Bowel sounds present  EXTREMITIES:  2+ Peripheral Pulses, No clubbing, cyanosis, or edema  LYMPH: No lymphadenopathy noted  SKIN: No rashes or lesions    LABS:              CAPILLARY BLOOD GLUCOSE      POCT Blood Glucose.: 150 mg/dL (06 Mar 2019 08:14)  POCT Blood Glucose.: 161 mg/dL (05 Mar 2019 21:27)  POCT Blood Glucose.: 155 mg/dL (05 Mar 2019 17:39)  POCT Blood Glucose.: 157 mg/dL (05 Mar 2019 15:41)  POCT Blood Glucose.: 166 mg/dL (05 Mar 2019 11:27)    CULTURES:    HEMOGLOBIN A1C:    CHOLESTEROL:        RADIOLOGY & ADDITIONAL TESTS:
Patient is a 78y old  Male who presents with a chief complaint of change in mental status (06 Mar 2019 10:13)      Interval History: currently on Lantus 10 units and prandial lispro 5 units and Lispro sliding scale coverage with meals and Metformin 500 mg BID   finger sticks are well controlled and in mid 100's     MEDICATIONS  (STANDING):  ciprofloxacin     Tablet 500 milliGRAM(s) Oral every 12 hours  dextrose 5%. 1000 milliLiter(s) (50 mL/Hr) IV Continuous <Continuous>  dextrose 50% Injectable 12.5 Gram(s) IV Push once  dextrose 50% Injectable 25 Gram(s) IV Push once  dextrose 50% Injectable 25 Gram(s) IV Push once  donepezil 10 milliGRAM(s) Oral at bedtime  heparin  Injectable 5000 Unit(s) SubCutaneous every 12 hours  influenza   Vaccine 0.5 milliLiter(s) IntraMuscular once  insulin glargine Injectable (LANTUS) 10 Unit(s) SubCutaneous every morning  insulin glargine Injectable (LANTUS) 7 Unit(s) SubCutaneous at bedtime  insulin lispro (HumaLOG) corrective regimen sliding scale   SubCutaneous three times a day before meals  insulin lispro Injectable (HumaLOG) 4 Unit(s) SubCutaneous three times a day before meals  lactated ringers. 1000 milliLiter(s) (100 mL/Hr) IV Continuous <Continuous>  tamsulosin 0.4 milliGRAM(s) Oral at bedtime    MEDICATIONS  (PRN):  aluminum hydroxide/magnesium hydroxide/simethicone Suspension 30 milliLiter(s) Oral every 6 hours PRN Dyspepsia  dextrose 40% Gel 15 Gram(s) Oral once PRN Blood Glucose LESS THAN 70 milliGRAM(s)/deciliter  glucagon  Injectable 1 milliGRAM(s) IntraMuscular once PRN Glucose LESS THAN 70 milligrams/deciliter  guaiFENesin    Syrup 200 milliGRAM(s) Oral every 6 hours PRN Cough      Allergies    penicillins (Unknown)    Intolerances        REVIEW OF SYSTEMS:  CONSTITUTIONAL: no changes  EYES: No eye pain, visual disturbances, or discharge  ENMT:  No difficulty hearing, No sinus or throat pain  NECK: No pain or stiffness  RESPIRATORY: No cough, wheezing, chills or hemoptysis; No shortness of breath  CARDIOVASCULAR: No chest pain, palpitations or leg swelling  GASTROINTESTINAL: No abdominal or epigastric pain. No nausea, vomiting, or hematemesis; No diarrhea or constipation. No melena or hematochezia.  GENITOURINARY: No dysuria, frequency, hematuria, or incontinence  NEUROLOGICAL: No headaches, memory loss, loss of strength, numbness, or tremors  SKIN: No itching, burning, rashes, or lesions   ENDOCRINE: No heat or cold intolerance; No hair loss  MUSCULOSKELETAL: No joint pain or swelling; No muscle, back, or extremity pain  PSYCHIATRIC: No depression, anxiety, mood swings, or difficulty sleeping  HEME/LYMPH: No easy bruising, or bleeding gums  ALLERY AND IMMUNOLOGIC: No hives or eczema    Vital Signs Last 24 Hrs  T(C): 37.7 (06 Mar 2019 16:05), Max: 37.7 (06 Mar 2019 16:05)  T(F): 99.9 (06 Mar 2019 16:05), Max: 99.9 (06 Mar 2019 16:05)  HR: 86 (06 Mar 2019 16:05) (81 - 86)  BP: 145/81 (06 Mar 2019 16:05) (130/83 - 145/89)  BP(mean): --  RR: 17 (06 Mar 2019 16:05) (16 - 17)  SpO2: 98% (06 Mar 2019 16:05) (96% - 98%)    PHYSICAL EXAM:  GENERAL:   HEAD: Atraumatic, Normocephalic  EYES: PERRLA, conjunctiva and sclera clear  ENMT: No tonsillar erythema, exudates, or enlargement; Moist mucous membranes, Good dentition, No lesions  NECK: Supple, No JVD, Normal thyroid  NERVOUS SYSTEM:  Alert & Oriented X3, Good concentration; Motor Strength 5/5 B/L upper and lower extremities  CHEST/LUNG: Clear to auscultaion bilaterally; No rales, rhonchi, wheezing, or rubs  HEART: Regular rate and rhythm; No murmurs, rubs, or gallops  ABDOMEN: Soft, Nontender, Nondistended; Bowel sounds present  EXTREMITIES:  2+ Peripheral Pulses, no edema  SKIN: No rashes or lesions    LABS:        CAPILLARY BLOOD GLUCOSE      POCT Blood Glucose.: 174 mg/dL (06 Mar 2019 16:05)  POCT Blood Glucose.: 126 mg/dL (06 Mar 2019 11:15)  POCT Blood Glucose.: 150 mg/dL (06 Mar 2019 08:14)  POCT Blood Glucose.: 161 mg/dL (05 Mar 2019 21:27)  POCT Blood Glucose.: 155 mg/dL (05 Mar 2019 17:39)    Lipid panel:           Thyroid:  Diabetes Tests:  Parathyroid Panel:  Adrenals:  RADIOLOGY & ADDITIONAL TESTS:    Imaging Personally Reviewed:  [ ] YES  [ ] NO    Consultant(s) Notes Reviewed:  [ ] YES  [ ] NO    Care Discussed with Consultants/Other Providers [ ] YES  [ ] NO
Patient is a 78y old  Male who presents with a chief complaint of change in mental status (07 Mar 2019 12:07)      INTERVAL HPI/OVERNIGHT EVENTS:    Pt is resting.   Tolerating meals.   BG are controlled.   No hypoglycemic events.     MEDICATIONS  (STANDING):  ciprofloxacin     Tablet 500 milliGRAM(s) Oral every 12 hours  dextrose 5%. 1000 milliLiter(s) (50 mL/Hr) IV Continuous <Continuous>  dextrose 50% Injectable 12.5 Gram(s) IV Push once  dextrose 50% Injectable 25 Gram(s) IV Push once  dextrose 50% Injectable 25 Gram(s) IV Push once  donepezil 10 milliGRAM(s) Oral at bedtime  heparin  Injectable 5000 Unit(s) SubCutaneous every 12 hours  influenza   Vaccine 0.5 milliLiter(s) IntraMuscular once  insulin glargine Injectable (LANTUS) 10 Unit(s) SubCutaneous every morning  insulin glargine Injectable (LANTUS) 7 Unit(s) SubCutaneous at bedtime  insulin lispro (HumaLOG) corrective regimen sliding scale   SubCutaneous three times a day before meals  insulin lispro Injectable (HumaLOG) 4 Unit(s) SubCutaneous three times a day before meals  lactated ringers. 1000 milliLiter(s) (100 mL/Hr) IV Continuous <Continuous>  tamsulosin 0.4 milliGRAM(s) Oral at bedtime    MEDICATIONS  (PRN):  aluminum hydroxide/magnesium hydroxide/simethicone Suspension 30 milliLiter(s) Oral every 6 hours PRN Dyspepsia  dextrose 40% Gel 15 Gram(s) Oral once PRN Blood Glucose LESS THAN 70 milliGRAM(s)/deciliter  glucagon  Injectable 1 milliGRAM(s) IntraMuscular once PRN Glucose LESS THAN 70 milligrams/deciliter  guaiFENesin    Syrup 200 milliGRAM(s) Oral every 6 hours PRN Cough      REVIEW OF SYSTEMS:  CONSTITUTIONAL: No fever, weight loss, or fatigue  RESPIRATORY: No cough, wheezing, chills or hemoptysis; No shortness of breath  CARDIOVASCULAR: No chest pain, palpitations, dizziness, or leg swelling  GASTROINTESTINAL: No abdominal or epigastric pain. No nausea, vomiting, or hematemesis; No diarrhea or constipation. No melena or hematochezia.  ENDOCRINE: No heat or cold intolerance; No hair loss      Vital Signs Last 24 Hrs  T(C): 36.1 (07 Mar 2019 11:30), Max: 37.7 (06 Mar 2019 16:05)  T(F): 97 (07 Mar 2019 11:30), Max: 99.9 (06 Mar 2019 16:05)  HR: 74 (07 Mar 2019 11:30) (68 - 86)  BP: 144/78 (07 Mar 2019 11:30) (121/69 - 163/101)  BP(mean): --  RR: 20 (07 Mar 2019 11:30) (16 - 20)  SpO2: 99% (07 Mar 2019 11:14) (98% - 100%)    PHYSICAL EXAM:  GENERAL: NAD, well-groomed, well-developed  CHEST/LUNG: Clear to percussion bilaterally; No rales, rhonchi, wheezing, or rubs  HEART: Regular rate and rhythm; No murmurs, rubs, or gallops  ABDOMEN: Soft, Nontender, Nondistended; Bowel sounds present  EXTREMITIES:  2+ Peripheral Pulses, No clubbing, cyanosis, or edema      CAPILLARY BLOOD GLUCOSE      POCT Blood Glucose.: 249 mg/dL (07 Mar 2019 11:29)  POCT Blood Glucose.: 112 mg/dL (07 Mar 2019 07:52)  POCT Blood Glucose.: 152 mg/dL (06 Mar 2019 22:01)  POCT Blood Glucose.: 174 mg/dL (06 Mar 2019 16:05)
Patient is a 78y old  Male who presents with a chief complaint of change in mental status (2019 12:41)      INTERVAL HPI/OVERNIGHT EVENTS:  pt is doing better  MEDICATIONS  (STANDING):  ciprofloxacin   IVPB 200 milliGRAM(s) IV Intermittent every 12 hours  dextrose 5%. 1000 milliLiter(s) (50 mL/Hr) IV Continuous <Continuous>  dextrose 50% Injectable 12.5 Gram(s) IV Push once  dextrose 50% Injectable 25 Gram(s) IV Push once  dextrose 50% Injectable 25 Gram(s) IV Push once  donepezil 10 milliGRAM(s) Oral at bedtime  heparin  Injectable 5000 Unit(s) SubCutaneous every 12 hours  influenza   Vaccine 0.5 milliLiter(s) IntraMuscular once  insulin glargine Injectable (LANTUS) 10 Unit(s) SubCutaneous every morning  insulin glargine Injectable (LANTUS) 10 Unit(s) SubCutaneous at bedtime  insulin lispro (HumaLOG) corrective regimen sliding scale   SubCutaneous three times a day before meals  lactated ringers. 1000 milliLiter(s) (100 mL/Hr) IV Continuous <Continuous>  tamsulosin 0.4 milliGRAM(s) Oral at bedtime    MEDICATIONS  (PRN):  dextrose 40% Gel 15 Gram(s) Oral once PRN Blood Glucose LESS THAN 70 milliGRAM(s)/deciliter  glucagon  Injectable 1 milliGRAM(s) IntraMuscular once PRN Glucose LESS THAN 70 milligrams/deciliter  guaiFENesin    Syrup 200 milliGRAM(s) Oral every 6 hours PRN Cough      Allergies    penicillins (Unknown)    Intolerances        REVIEW OF SYSTEMS:  CONSTITUTIONAL: No fever, weight loss, or fatigue  EYES: No eye pain, visual disturbances, or discharge  ENMT:  No difficulty hearing, tinnitus, vertigo; No sinus or throat pain  NECK: No pain or stiffness  BREASTS: No pain, masses, or nipple discharge  RESPIRATORY: No cough, wheezing, chills or hemoptysis; No shortness of breath  CARDIOVASCULAR: No chest pain, palpitations, dizziness, or leg swelling  GASTROINTESTINAL: No abdominal or epigastric pain. No nausea, vomiting, or hematemesis; No diarrhea or constipation. No melena or hematochezia.  GENITOURINARY: No dysuria, frequency, hematuria, or incontinence  NEUROLOGICAL: No headaches, memory loss, loss of strength, numbness, or tremors  SKIN: No itching, burning, rashes, or lesions   LYMPH NODES: No enlarged glands  ENDOCRINE: No heat or cold intolerance; No hair loss  MUSCULOSKELETAL: No joint pain or swelling; No muscle, back, or extremity pain  PSYCHIATRIC: No depression, anxiety, mood swings, or difficulty sleeping  HEME/LYMPH: No easy bruising, or bleeding gums  ALLERGY AND IMMUNOLOGIC: No hives or eczema    Vital Signs Last 24 Hrs  T(C): 36.8 (2019 04:25), Max: 37.1 (2019 23:25)  T(F): 98.3 (2019 04:25), Max: 98.7 (2019 23:25)  HR: 84 (2019 04:) (70 - 97)  BP: 134/83 (2019 04:25) (125/69 - 164/84)  BP(mean): --  RR: 17 (2019 04:25) (15 - 17)  SpO2: 96% (2019 04:25) (96% - 99%)    PHYSICAL EXAM:  GENERAL: NAD, well-groomed, well-developed  HEAD:  Atraumatic, Normocephalic  EYES: EOMI, PERRLA, conjunctiva and sclera clear  ENMT: No tonsillar erythema, exudates, or enlargement; Moist mucous membranes, Good dentition, No lesions  NECK: Supple, No JVD, Normal thyroid  NERVOUS SYSTEM:  Alert & Oriented X3, Good concentration; Motor Strength 5/5 B/L upper and lower extremities; DTRs 2+ intact and symmetric  CHEST/LUNG: Clear to percussion bilaterally; No rales, rhonchi, wheezing, or rubs  HEART: Regular rate and rhythm; No murmurs, rubs, or gallops  ABDOMEN: Soft, Nontender, Nondistended; Bowel sounds present  EXTREMITIES:  2+ Peripheral Pulses, No clubbing, cyanosis, or edema  LYMPH: No lymphadenopathy noted  SKIN: No rashes or lesions    LABS:                        9.7    8.15  )-----------( 356      ( 2019 08:10 )             26.9     02-    138  |  108  |  34<H>  ----------------------------<  205<H>  5.1   |  23  |  1.95<H>    Ca    9.5      2019 08:10    TPro  7.8  /  Alb  2.4<L>  /  TBili  0.4  /  DBili  x   /  AST  34  /  ALT  30  /  AlkPhos  95  -    PT/INR - ( 2019 18:59 )   PT: 12.1 sec;   INR: 1.08 ratio         PTT - ( 2019 18:59 )  PTT:24.1 sec  Urinalysis Basic - ( 2019 22:45 )    Color: Yellow / Appearance: Clear / S.015 / pH: x  Gluc: x / Ketone: Negative  / Bili: Negative / Urobili: Negative mg/dL   Blood: x / Protein: 15 mg/dL / Nitrite: Negative   Leuk Esterase: Moderate / RBC: 6-10 /HPF / WBC 3-5   Sq Epi: x / Non Sq Epi: Moderate / Bacteria: Moderate      CAPILLARY BLOOD GLUCOSE      POCT Blood Glucose.: 253 mg/dL (2019 09:58)  POCT Blood Glucose.: 214 mg/dL (2019 08:33)  POCT Blood Glucose.: 232 mg/dL (2019 21:53)  POCT Blood Glucose.: 131 mg/dL (2019 17:23)  POCT Blood Glucose.: 148 mg/dL (2019 13:41)    CULTURES:  Culture Results:   No growth to date. ( @ 00:54)  Culture Results:   No growth to date. ( @ 00:54)    HEMOGLOBIN A1C:    CHOLESTEROL:    ABG - ( 2019 20:12 )  pH, Arterial: x     pH, Blood: 7.35  /  pCO2: 34    /  pO2: 101   / HCO3: 18    / Base Excess: -6.1  /  SaO2: 97                  RADIOLOGY & ADDITIONAL TESTS:
Patient is a 78y old  Male who presents with a chief complaint of change in mental status (24 Feb 2019 10:56)      INTERVAL HPI/OVERNIGHT EVENTS:  pt is doing better  MEDICATIONS  (STANDING):  ciprofloxacin   IVPB 200 milliGRAM(s) IV Intermittent every 12 hours  dextrose 5%. 1000 milliLiter(s) (50 mL/Hr) IV Continuous <Continuous>  dextrose 50% Injectable 12.5 Gram(s) IV Push once  dextrose 50% Injectable 25 Gram(s) IV Push once  dextrose 50% Injectable 25 Gram(s) IV Push once  donepezil 10 milliGRAM(s) Oral at bedtime  heparin  Injectable 5000 Unit(s) SubCutaneous every 12 hours  influenza   Vaccine 0.5 milliLiter(s) IntraMuscular once  insulin glargine Injectable (LANTUS) 10 Unit(s) SubCutaneous every morning  insulin glargine Injectable (LANTUS) 10 Unit(s) SubCutaneous at bedtime  insulin lispro (HumaLOG) corrective regimen sliding scale   SubCutaneous three times a day before meals  lactated ringers. 1000 milliLiter(s) (100 mL/Hr) IV Continuous <Continuous>  tamsulosin 0.4 milliGRAM(s) Oral at bedtime    MEDICATIONS  (PRN):  dextrose 40% Gel 15 Gram(s) Oral once PRN Blood Glucose LESS THAN 70 milliGRAM(s)/deciliter  glucagon  Injectable 1 milliGRAM(s) IntraMuscular once PRN Glucose LESS THAN 70 milligrams/deciliter  guaiFENesin    Syrup 200 milliGRAM(s) Oral every 6 hours PRN Cough      Allergies    penicillins (Unknown)    Intolerances        REVIEW OF SYSTEMS:  CONSTITUTIONAL: No fever, weight loss, or fatigue  EYES: No eye pain, visual disturbances, or discharge  ENMT:  No difficulty hearing, tinnitus, vertigo; No sinus or throat pain  NECK: No pain or stiffness  BREASTS: No pain, masses, or nipple discharge  RESPIRATORY: No cough, wheezing, chills or hemoptysis; No shortness of breath  CARDIOVASCULAR: No chest pain, palpitations, dizziness, or leg swelling  GASTROINTESTINAL: No abdominal or epigastric pain. No nausea, vomiting, or hematemesis; No diarrhea or constipation. No melena or hematochezia.  GENITOURINARY: No dysuria, frequency, hematuria, or incontinence  NEUROLOGICAL: No headaches, memory loss, loss of strength, numbness, or tremors  SKIN: No itching, burning, rashes, or lesions   LYMPH NODES: No enlarged glands  ENDOCRINE: No heat or cold intolerance; No hair loss  MUSCULOSKELETAL: No joint pain or swelling; No muscle, back, or extremity pain  PSYCHIATRIC: No depression, anxiety, mood swings, or difficulty sleeping  HEME/LYMPH: No easy bruising, or bleeding gums  ALLERGY AND IMMUNOLOGIC: No hives or eczema    Vital Signs Last 24 Hrs  T(C): 37 (25 Feb 2019 11:01), Max: 37.1 (24 Feb 2019 17:20)  T(F): 98.6 (25 Feb 2019 11:01), Max: 98.7 (24 Feb 2019 17:20)  HR: 95 (25 Feb 2019 11:01) (75 - 104)  BP: 112/73 (25 Feb 2019 11:01) (112/73 - 154/85)  BP(mean): --  RR: 17 (25 Feb 2019 11:01) (17 - 17)  SpO2: 98% (25 Feb 2019 11:01) (97% - 98%)    PHYSICAL EXAM:  GENERAL: NAD, well-groomed, well-developed  HEAD:  Atraumatic, Normocephalic  EYES: EOMI, PERRLA, conjunctiva and sclera clear  ENMT: No tonsillar erythema, exudates, or enlargement; Moist mucous membranes, Good dentition, No lesions  NECK: Supple, No JVD, Normal thyroid  NERVOUS SYSTEM:  Alert & Oriented X3, Good concentration; Motor Strength 5/5 B/L upper and lower extremities; DTRs 2+ intact and symmetric  CHEST/LUNG: Clear to percussion bilaterally; No rales, rhonchi, wheezing, or rubs  HEART: Regular rate and rhythm; No murmurs, rubs, or gallops  ABDOMEN: Soft, Nontender, Nondistended; Bowel sounds present  EXTREMITIES:  2+ Peripheral Pulses, No clubbing, cyanosis, or edema  LYMPH: No lymphadenopathy noted  SKIN: No rashes or lesions    LABS:                        9.9    7.38  )-----------( 367      ( 25 Feb 2019 07:59 )             28.4     02-25    140  |  107  |  25<H>  ----------------------------<  204<H>  4.5   |  22  |  1.60<H>    Ca    9.2      25 Feb 2019 07:59    TPro  7.8  /  Alb  2.4<L>  /  TBili  0.4  /  DBili  x   /  AST  34  /  ALT  30  /  AlkPhos  95  02-24        CAPILLARY BLOOD GLUCOSE      POCT Blood Glucose.: 243 mg/dL (25 Feb 2019 11:52)  POCT Blood Glucose.: 238 mg/dL (25 Feb 2019 07:52)  POCT Blood Glucose.: 223 mg/dL (24 Feb 2019 22:14)  POCT Blood Glucose.: 190 mg/dL (24 Feb 2019 17:52)    CULTURES:  Culture Results:   No growth to date. (02-23 @ 00:54)  Culture Results:   No growth to date. (02-23 @ 00:54)    HEMOGLOBIN A1C:  Hemoglobin A1C, Whole Blood: 12.5 % (02-24-19 @ 12:55)    CHOLESTEROL:        RADIOLOGY & ADDITIONAL TESTS:
Patient is a 78y old  Male who presents with a chief complaint of change in mental status (25 Feb 2019 12:23)      INTERVAL HPI/OVERNIGHT EVENTS:  pt is getting better  MEDICATIONS  (STANDING):  ciprofloxacin   IVPB 200 milliGRAM(s) IV Intermittent every 12 hours  dextrose 5%. 1000 milliLiter(s) (50 mL/Hr) IV Continuous <Continuous>  dextrose 50% Injectable 12.5 Gram(s) IV Push once  dextrose 50% Injectable 25 Gram(s) IV Push once  dextrose 50% Injectable 25 Gram(s) IV Push once  donepezil 10 milliGRAM(s) Oral at bedtime  heparin  Injectable 5000 Unit(s) SubCutaneous every 12 hours  influenza   Vaccine 0.5 milliLiter(s) IntraMuscular once  insulin glargine Injectable (LANTUS) 10 Unit(s) SubCutaneous every morning  insulin glargine Injectable (LANTUS) 10 Unit(s) SubCutaneous at bedtime  insulin lispro (HumaLOG) corrective regimen sliding scale   SubCutaneous three times a day before meals  lactated ringers. 1000 milliLiter(s) (100 mL/Hr) IV Continuous <Continuous>  tamsulosin 0.4 milliGRAM(s) Oral at bedtime    MEDICATIONS  (PRN):  dextrose 40% Gel 15 Gram(s) Oral once PRN Blood Glucose LESS THAN 70 milliGRAM(s)/deciliter  glucagon  Injectable 1 milliGRAM(s) IntraMuscular once PRN Glucose LESS THAN 70 milligrams/deciliter  guaiFENesin    Syrup 200 milliGRAM(s) Oral every 6 hours PRN Cough      Allergies    penicillins (Unknown)    Intolerances        REVIEW OF SYSTEMS:  CONSTITUTIONAL: No fever, weight loss, or fatigue  EYES: No eye pain, visual disturbances, or discharge  ENMT:  No difficulty hearing, tinnitus, vertigo; No sinus or throat pain  NECK: No pain or stiffness  BREASTS: No pain, masses, or nipple discharge  RESPIRATORY: No cough, wheezing, chills or hemoptysis; No shortness of breath  CARDIOVASCULAR: No chest pain, palpitations, dizziness, or leg swelling  GASTROINTESTINAL: No abdominal or epigastric pain. No nausea, vomiting, or hematemesis; No diarrhea or constipation. No melena or hematochezia.  GENITOURINARY: No dysuria, frequency, hematuria, or incontinence  NEUROLOGICAL: No headaches, memory loss, loss of strength, numbness, or tremors  SKIN: No itching, burning, rashes, or lesions   LYMPH NODES: No enlarged glands  ENDOCRINE: No heat or cold intolerance; No hair loss  MUSCULOSKELETAL: No joint pain or swelling; No muscle, back, or extremity pain  PSYCHIATRIC: No depression, anxiety, mood swings, or difficulty sleeping  HEME/LYMPH: No easy bruising, or bleeding gums  ALLERGY AND IMMUNOLOGIC: No hives or eczema    Vital Signs Last 24 Hrs  T(C): 36.5 (26 Feb 2019 05:16), Max: 37.8 (25 Feb 2019 16:58)  T(F): 97.7 (26 Feb 2019 05:16), Max: 100 (25 Feb 2019 16:58)  HR: 76 (26 Feb 2019 05:16) (76 - 104)  BP: 117/85 (26 Feb 2019 05:16) (112/73 - 149/88)  BP(mean): --  RR: 18 (26 Feb 2019 05:16) (17 - 18)  SpO2: 98% (26 Feb 2019 05:16) (97% - 99%)    PHYSICAL EXAM:  GENERAL: NAD, well-groomed, well-developed  HEAD:  Atraumatic, Normocephalic  EYES: EOMI, PERRLA, conjunctiva and sclera clear  ENMT: No tonsillar erythema, exudates, or enlargement; Moist mucous membranes, Good dentition, No lesions  NECK: Supple, No JVD, Normal thyroid  NERVOUS SYSTEM:  Alert & Oriented X3, Good concentration; Motor Strength 5/5 B/L upper and lower extremities; DTRs 2+ intact and symmetric  CHEST/LUNG: Clear to percussion bilaterally; No rales, rhonchi, wheezing, or rubs  HEART: Regular rate and rhythm; No murmurs, rubs, or gallops  ABDOMEN: Soft, Nontender, Nondistended; Bowel sounds present  EXTREMITIES:  2+ Peripheral Pulses, No clubbing, cyanosis, or edema  LYMPH: No lymphadenopathy noted  SKIN: No rashes or lesions    LABS:                        9.9    7.38  )-----------( 367      ( 25 Feb 2019 07:59 )             28.4     02-25    140  |  107  |  25<H>  ----------------------------<  204<H>  4.5   |  22  |  1.60<H>    Ca    9.2      25 Feb 2019 07:59    TPro  7.8  /  Alb  2.4<L>  /  TBili  0.4  /  DBili  x   /  AST  34  /  ALT  30  /  AlkPhos  95  02-24        CAPILLARY BLOOD GLUCOSE      POCT Blood Glucose.: 232 mg/dL (25 Feb 2019 21:53)  POCT Blood Glucose.: 267 mg/dL (25 Feb 2019 16:32)  POCT Blood Glucose.: 243 mg/dL (25 Feb 2019 11:52)  POCT Blood Glucose.: 238 mg/dL (25 Feb 2019 07:52)    CULTURES:    HEMOGLOBIN A1C:    CHOLESTEROL:        RADIOLOGY & ADDITIONAL TESTS:
Patient is a 78y old  Male who presents with a chief complaint of change in mental status (26 Feb 2019 11:13)      Interval History: finger sticks are in low 200's   on prandial lispro 5 units added and Lantus 10 units     MEDICATIONS  (STANDING):  ciprofloxacin   IVPB 200 milliGRAM(s) IV Intermittent every 12 hours  dextrose 5%. 1000 milliLiter(s) (50 mL/Hr) IV Continuous <Continuous>  dextrose 50% Injectable 12.5 Gram(s) IV Push once  dextrose 50% Injectable 25 Gram(s) IV Push once  dextrose 50% Injectable 25 Gram(s) IV Push once  donepezil 10 milliGRAM(s) Oral at bedtime  heparin  Injectable 5000 Unit(s) SubCutaneous every 12 hours  influenza   Vaccine 0.5 milliLiter(s) IntraMuscular once  insulin glargine Injectable (LANTUS) 10 Unit(s) SubCutaneous every morning  insulin glargine Injectable (LANTUS) 10 Unit(s) SubCutaneous at bedtime  insulin lispro (HumaLOG) corrective regimen sliding scale   SubCutaneous three times a day before meals  insulin lispro Injectable (HumaLOG) 5 Unit(s) SubCutaneous three times a day before meals  lactated ringers. 1000 milliLiter(s) (100 mL/Hr) IV Continuous <Continuous>  tamsulosin 0.4 milliGRAM(s) Oral at bedtime    MEDICATIONS  (PRN):  dextrose 40% Gel 15 Gram(s) Oral once PRN Blood Glucose LESS THAN 70 milliGRAM(s)/deciliter  glucagon  Injectable 1 milliGRAM(s) IntraMuscular once PRN Glucose LESS THAN 70 milligrams/deciliter  guaiFENesin    Syrup 200 milliGRAM(s) Oral every 6 hours PRN Cough      Allergies    penicillins (Unknown)    Intolerances        REVIEW OF SYSTEMS:  CONSTITUTIONAL: no changes  EYES: No eye pain, visual disturbances, or discharge  ENMT:  No difficulty hearing, No sinus or throat pain  NECK: No pain or stiffness  RESPIRATORY: No cough, wheezing, chills or hemoptysis; No shortness of breath  CARDIOVASCULAR: No chest pain, palpitations or leg swelling  GASTROINTESTINAL: No abdominal or epigastric pain. No nausea, vomiting, or hematemesis; No diarrhea or constipation. No melena or hematochezia.  GENITOURINARY: No dysuria, frequency, hematuria, or incontinence  NEUROLOGICAL: No headaches, memory loss, loss of strength, numbness, or tremors  SKIN: No itching, burning, rashes, or lesions   ENDOCRINE: No heat or cold intolerance; No hair loss  MUSCULOSKELETAL: No joint pain or swelling; No muscle, back, or extremity pain  PSYCHIATRIC: No depression, anxiety, mood swings, or difficulty sleeping  HEME/LYMPH: No easy bruising, or bleeding gums  ALLERY AND IMMUNOLOGIC: No hives or eczema    Vital Signs Last 24 Hrs  T(C): 36.6 (27 Feb 2019 11:50), Max: 37.3 (26 Feb 2019 16:18)  T(F): 97.9 (27 Feb 2019 11:50), Max: 99.1 (26 Feb 2019 16:18)  HR: 75 (27 Feb 2019 11:50) (75 - 91)  BP: 144/84 (27 Feb 2019 11:50) (144/84 - 153/92)  BP(mean): --  RR: 18 (27 Feb 2019 11:50) (17 - 18)  SpO2: 96% (27 Feb 2019 11:50) (94% - 100%)    PHYSICAL EXAM:  GENERAL:   HEAD: Atraumatic, Normocephalic  EYES: PERRLA, conjunctiva and sclera clear  ENMT: No tonsillar erythema, exudates, or enlargement; Moist mucous membranes, Good dentition, No lesions  NECK: Supple, No JVD, Normal thyroid  NERVOUS SYSTEM:  Alert & Oriented X3, Good concentration; Motor Strength 5/5 B/L upper and lower extremities  CHEST/LUNG: Clear to auscultaion bilaterally; No rales, rhonchi, wheezing, or rubs  HEART: Regular rate and rhythm; No murmurs, rubs, or gallops  ABDOMEN: Soft, Nontender, Nondistended; Bowel sounds present  EXTREMITIES:  2+ Peripheral Pulses, no edema  SKIN: No rashes or lesions    LABS:        CAPILLARY BLOOD GLUCOSE      POCT Blood Glucose.: 272 mg/dL (27 Feb 2019 11:29)  POCT Blood Glucose.: 172 mg/dL (27 Feb 2019 07:57)  POCT Blood Glucose.: 190 mg/dL (26 Feb 2019 21:53)  POCT Blood Glucose.: 183 mg/dL (26 Feb 2019 15:53)    Lipid panel:           Thyroid:  Diabetes Tests:  Parathyroid Panel:  Adrenals:  RADIOLOGY & ADDITIONAL TESTS:    Imaging Personally Reviewed:  [ ] YES  [ ] NO    Consultant(s) Notes Reviewed:  [ ] YES  [ ] NO    Care Discussed with Consultants/Other Providers [ ] YES  [ ] NO
Patient is a 78y old  Male who presents with a chief complaint of change in mental status (28 Feb 2019 11:44)      INTERVAL HPI/OVERNIGHT EVENTS:  pt with no complaints  eating well good appetite  glucose better    MEDICATIONS  (STANDING):  ciprofloxacin   IVPB 200 milliGRAM(s) IV Intermittent every 12 hours  dextrose 5%. 1000 milliLiter(s) (50 mL/Hr) IV Continuous <Continuous>  dextrose 50% Injectable 12.5 Gram(s) IV Push once  dextrose 50% Injectable 25 Gram(s) IV Push once  dextrose 50% Injectable 25 Gram(s) IV Push once  donepezil 10 milliGRAM(s) Oral at bedtime  heparin  Injectable 5000 Unit(s) SubCutaneous every 12 hours  influenza   Vaccine 0.5 milliLiter(s) IntraMuscular once  insulin glargine Injectable (LANTUS) 10 Unit(s) SubCutaneous every morning  insulin glargine Injectable (LANTUS) 10 Unit(s) SubCutaneous at bedtime  insulin lispro (HumaLOG) corrective regimen sliding scale   SubCutaneous three times a day before meals  insulin lispro Injectable (HumaLOG) 5 Unit(s) SubCutaneous three times a day before meals  lactated ringers. 1000 milliLiter(s) (100 mL/Hr) IV Continuous <Continuous>  tamsulosin 0.4 milliGRAM(s) Oral at bedtime    MEDICATIONS  (PRN):  dextrose 40% Gel 15 Gram(s) Oral once PRN Blood Glucose LESS THAN 70 milliGRAM(s)/deciliter  glucagon  Injectable 1 milliGRAM(s) IntraMuscular once PRN Glucose LESS THAN 70 milligrams/deciliter  guaiFENesin    Syrup 200 milliGRAM(s) Oral every 6 hours PRN Cough      REVIEW OF SYSTEMS:  CONSTITUTIONAL: No fever, weight loss, or fatigue  RESPIRATORY: No cough, wheezing, chills or hemoptysis; No shortness of breath  CARDIOVASCULAR: No chest pain, palpitations, dizziness, or leg swelling  GASTROINTESTINAL: No abdominal or epigastric pain. No nausea, vomiting, or hematemesis; No diarrhea or constipation. No melena or hematochezia.  ENDOCRINE: No heat or cold intolerance; No hair loss      Vital Signs Last 24 Hrs  T(C): 37.1 (01 Mar 2019 10:38), Max: 37.1 (28 Feb 2019 16:30)  T(F): 98.7 (01 Mar 2019 10:38), Max: 98.7 (28 Feb 2019 16:30)  HR: 99 (01 Mar 2019 10:38) (76 - 99)  BP: 159/95 (01 Mar 2019 10:38) (119/68 - 173/80)  BP(mean): --  RR: 18 (01 Mar 2019 10:38) (18 - 18)  SpO2: 99% (01 Mar 2019 10:38) (98% - 100%)    PHYSICAL EXAM:  GENERAL: NAD, well-groomed, well-developed  CHEST/LUNG: Clear to percussion bilaterally; No rales, rhonchi, wheezing, or rubs        LABS:              CAPILLARY BLOOD GLUCOSE      POCT Blood Glucose.: 100 mg/dL (01 Mar 2019 07:44)  POCT Blood Glucose.: 174 mg/dL (28 Feb 2019 22:02)  POCT Blood Glucose.: 189 mg/dL (28 Feb 2019 21:03)  POCT Blood Glucose.: 184 mg/dL (28 Feb 2019 15:59)  POCT Blood Glucose.: 239 mg/dL (28 Feb 2019 11:10)    Lipid panel:               RADIOLOGY & ADDITIONAL TESTS:
Patient is a 78y old  Male who presents with a chief complaint of change in mental status (27 Feb 2019 20:26)      Interval History: finger sticks are increased   discharge planning is on to rehab     MEDICATIONS  (STANDING):  ciprofloxacin   IVPB 200 milliGRAM(s) IV Intermittent every 12 hours  dextrose 5%. 1000 milliLiter(s) (50 mL/Hr) IV Continuous <Continuous>  dextrose 50% Injectable 12.5 Gram(s) IV Push once  dextrose 50% Injectable 25 Gram(s) IV Push once  dextrose 50% Injectable 25 Gram(s) IV Push once  donepezil 10 milliGRAM(s) Oral at bedtime  heparin  Injectable 5000 Unit(s) SubCutaneous every 12 hours  influenza   Vaccine 0.5 milliLiter(s) IntraMuscular once  insulin glargine Injectable (LANTUS) 10 Unit(s) SubCutaneous every morning  insulin glargine Injectable (LANTUS) 10 Unit(s) SubCutaneous at bedtime  insulin lispro (HumaLOG) corrective regimen sliding scale   SubCutaneous three times a day before meals  insulin lispro Injectable (HumaLOG) 5 Unit(s) SubCutaneous three times a day before meals  lactated ringers. 1000 milliLiter(s) (100 mL/Hr) IV Continuous <Continuous>  tamsulosin 0.4 milliGRAM(s) Oral at bedtime    MEDICATIONS  (PRN):  dextrose 40% Gel 15 Gram(s) Oral once PRN Blood Glucose LESS THAN 70 milliGRAM(s)/deciliter  glucagon  Injectable 1 milliGRAM(s) IntraMuscular once PRN Glucose LESS THAN 70 milligrams/deciliter  guaiFENesin    Syrup 200 milliGRAM(s) Oral every 6 hours PRN Cough      Allergies    penicillins (Unknown)    Intolerances        REVIEW OF SYSTEMS:  CONSTITUTIONAL: no changes  EYES: No eye pain, visual disturbances, or discharge  ENMT:  No difficulty hearing, No sinus or throat pain  NECK: No pain or stiffness  RESPIRATORY: No cough, wheezing, chills or hemoptysis; No shortness of breath  CARDIOVASCULAR: No chest pain, palpitations or leg swelling  GASTROINTESTINAL: No abdominal or epigastric pain. No nausea, vomiting, or hematemesis; No diarrhea or constipation. No melena or hematochezia.  GENITOURINARY: No dysuria, frequency, hematuria, or incontinence  NEUROLOGICAL: No headaches, memory loss, loss of strength, numbness, or tremors  SKIN: No itching, burning, rashes, or lesions   ENDOCRINE: No heat or cold intolerance; No hair loss  MUSCULOSKELETAL: No joint pain or swelling; No muscle, back, or extremity pain  PSYCHIATRIC: No depression, anxiety, mood swings, or difficulty sleeping  HEME/LYMPH: No easy bruising, or bleeding gums  ALLERY AND IMMUNOLOGIC: No hives or eczema    Vital Signs Last 24 Hrs  T(C): 36.8 (28 Feb 2019 04:32), Max: 36.8 (27 Feb 2019 16:30)  T(F): 98.2 (28 Feb 2019 04:32), Max: 98.2 (27 Feb 2019 16:30)  HR: 85 (28 Feb 2019 04:32) (75 - 90)  BP: 129/81 (28 Feb 2019 04:32) (129/81 - 151/80)  BP(mean): --  RR: 19 (28 Feb 2019 04:32) (18 - 19)  SpO2: 99% (28 Feb 2019 04:32) (96% - 99%)    PHYSICAL EXAM:  GENERAL:   HEAD: Atraumatic, Normocephalic  EYES: PERRLA, conjunctiva and sclera clear  ENMT: No tonsillar erythema, exudates, or enlargement; Moist mucous membranes, Good dentition, No lesions  NECK: Supple, No JVD, Normal thyroid  NERVOUS SYSTEM:  Alert & Oriented X3, Good concentration; Motor Strength 5/5 B/L upper and lower extremities  CHEST/LUNG: Clear to auscultaion bilaterally; No rales, rhonchi, wheezing, or rubs  HEART: Regular rate and rhythm; No murmurs, rubs, or gallops  ABDOMEN: Soft, Nontender, Nondistended; Bowel sounds present  EXTREMITIES:  2+ Peripheral Pulses, no edema  SKIN: No rashes or lesions    LABS:        CAPILLARY BLOOD GLUCOSE      POCT Blood Glucose.: 239 mg/dL (28 Feb 2019 11:10)  POCT Blood Glucose.: 123 mg/dL (28 Feb 2019 07:42)  POCT Blood Glucose.: 178 mg/dL (27 Feb 2019 20:59)  POCT Blood Glucose.: 197 mg/dL (27 Feb 2019 17:04)    Lipid panel:           Thyroid:  Diabetes Tests:  Parathyroid Panel:  Adrenals:  RADIOLOGY & ADDITIONAL TESTS:    Imaging Personally Reviewed:  [ ] YES  [ ] NO    Consultant(s) Notes Reviewed:  [ ] YES  [ ] NO    Care Discussed with Consultants/Other Providers [ ] YES  [ ] NO
Patient is a 78y old  Male who presents with a chief complaint of change in mental status (2019 23:12)      INTERVAL HPI/OVERNIGHT EVENTS:  pt is doing little better  MEDICATIONS  (STANDING):  ciprofloxacin   IVPB 200 milliGRAM(s) IV Intermittent every 12 hours  dextrose 5%. 1000 milliLiter(s) (50 mL/Hr) IV Continuous <Continuous>  dextrose 50% Injectable 12.5 Gram(s) IV Push once  dextrose 50% Injectable 25 Gram(s) IV Push once  dextrose 50% Injectable 25 Gram(s) IV Push once  donepezil 10 milliGRAM(s) Oral at bedtime  heparin  Injectable 5000 Unit(s) SubCutaneous every 12 hours  influenza   Vaccine 0.5 milliLiter(s) IntraMuscular once  insulin glargine Injectable (LANTUS) 10 Unit(s) SubCutaneous every morning  insulin glargine Injectable (LANTUS) 10 Unit(s) SubCutaneous at bedtime  insulin lispro (HumaLOG) corrective regimen sliding scale   SubCutaneous three times a day before meals  lactated ringers. 1000 milliLiter(s) (100 mL/Hr) IV Continuous <Continuous>  tamsulosin 0.4 milliGRAM(s) Oral at bedtime    MEDICATIONS  (PRN):  dextrose 40% Gel 15 Gram(s) Oral once PRN Blood Glucose LESS THAN 70 milliGRAM(s)/deciliter  glucagon  Injectable 1 milliGRAM(s) IntraMuscular once PRN Glucose LESS THAN 70 milligrams/deciliter  guaiFENesin    Syrup 200 milliGRAM(s) Oral every 6 hours PRN Cough      Allergies    penicillins (Unknown)    Intolerances        REVIEW OF SYSTEMS:  CONSTITUTIONAL: No fever, weight loss, or fatigue  EYES: No eye pain, visual disturbances, or discharge  ENMT:  No difficulty hearing, tinnitus, vertigo; No sinus or throat pain  NECK: No pain or stiffness  BREASTS: No pain, masses, or nipple discharge  RESPIRATORY: No cough, wheezing, chills or hemoptysis; No shortness of breath  CARDIOVASCULAR: No chest pain, palpitations, dizziness, or leg swelling  GASTROINTESTINAL: No abdominal or epigastric pain. No nausea, vomiting, or hematemesis; No diarrhea or constipation. No melena or hematochezia.  GENITOURINARY: No dysuria, frequency, hematuria, or incontinence  NEUROLOGICAL: No headaches, memory loss, loss of strength, numbness, or tremors  SKIN: No itching, burning, rashes, or lesions   LYMPH NODES: No enlarged glands  ENDOCRINE: No heat or cold intolerance; No hair loss  MUSCULOSKELETAL: No joint pain or swelling; No muscle, back, or extremity pain  PSYCHIATRIC: No depression, anxiety, mood swings, or difficulty sleeping  HEME/LYMPH: No easy bruising, or bleeding gums  ALLERGY AND IMMUNOLOGIC: No hives or eczema    Vital Signs Last 24 Hrs  T(C): 36.4 (2019 11:03), Max: 36.7 (2019 16:38)  T(F): 97.6 (2019 11:03), Max: 98.1 (2019 02:23)  HR: 85 (2019 11:03) (78 - 86)  BP: 132/72 (2019 11:03) (111/59 - 135/77)  BP(mean): --  RR: 15 (2019 11:03) (15 - 20)  SpO2: 96% (2019 11:03) (96% - 100%)    PHYSICAL EXAM:  GENERAL: NAD, well-groomed, well-developed  HEAD:  Atraumatic, Normocephalic  EYES: EOMI, PERRLA, conjunctiva and sclera clear  ENMT: No tonsillar erythema, exudates, or enlargement; Moist mucous membranes, Good dentition, No lesions  NECK: Supple, No JVD, Normal thyroid  NERVOUS SYSTEM:  Alert & Oriented X3, Good concentration; Motor Strength 5/5 B/L upper and lower extremities; DTRs 2+ intact and symmetric  CHEST/LUNG: Clear to percussion bilaterally; No rales, rhonchi, wheezing, or rubs  HEART: Regular rate and rhythm; No murmurs, rubs, or gallops  ABDOMEN: Soft, Nontender, Nondistended; Bowel sounds present  EXTREMITIES:  2+ Peripheral Pulses, No clubbing, cyanosis, or edema  LYMPH: No lymphadenopathy noted  SKIN: No rashes or lesions    LABS:                        9.8    9.72  )-----------( 375      ( 2019 09:07 )             27.7     02-    138  |  107  |  56<H>  ----------------------------<  266<H>  5.4<H>   |  20<L>  |  2.80<H>    Ca    9.6      2019 09:07    TPro  8.8<H>  /  Alb  3.0<L>  /  TBili  0.4  /  DBili  x   /  AST  23  /  ALT  40  /  AlkPhos  118  02-    PT/INR - ( 2019 18:59 )   PT: 12.1 sec;   INR: 1.08 ratio         PTT - ( 2019 18:59 )  PTT:24.1 sec  Urinalysis Basic - ( 2019 22:45 )    Color: Yellow / Appearance: Clear / S.015 / pH: x  Gluc: x / Ketone: Negative  / Bili: Negative / Urobili: Negative mg/dL   Blood: x / Protein: 15 mg/dL / Nitrite: Negative   Leuk Esterase: Moderate / RBC: 6-10 /HPF / WBC 3-5   Sq Epi: x / Non Sq Epi: Moderate / Bacteria: Moderate      CAPILLARY BLOOD GLUCOSE      POCT Blood Glucose.: 263 mg/dL (2019 07:52)  POCT Blood Glucose.: 343 mg/dL (2019 01:06)  POCT Blood Glucose.: >600 mg/dL (2019 22:20)  POCT Blood Glucose.: 541 mg/dL (2019 22:19)  POCT Blood Glucose.: >600 mg/dL (2019 18:01)  POCT Blood Glucose.: >600 mg/dL (2019 18:00)    CULTURES:    HEMOGLOBIN A1C:    CHOLESTEROL:    ABG - ( 2019 20:12 )  pH, Arterial: x     pH, Blood: 7.35  /  pCO2: 34    /  pO2: 101   / HCO3: 18    / Base Excess: -6.1  /  SaO2: 97                  RADIOLOGY & ADDITIONAL TESTS:

## 2019-03-07 NOTE — DISCHARGE NOTE NURSING/CASE MANAGEMENT/SOCIAL WORK - NSDCDPATPORTLINK_GEN_ALL_CORE
You can access the [facility] Patient Portal, offered by [facility], by registering with the following website: [email or web address]

## 2019-03-13 DIAGNOSIS — K21.9 GASTRO-ESOPHAGEAL REFLUX DISEASE WITHOUT ESOPHAGITIS: ICD-10-CM

## 2019-03-13 DIAGNOSIS — N18.4 CHRONIC KIDNEY DISEASE, STAGE 4 (SEVERE): ICD-10-CM

## 2019-03-13 DIAGNOSIS — N17.9 ACUTE KIDNEY FAILURE, UNSPECIFIED: ICD-10-CM

## 2019-03-13 DIAGNOSIS — Z88.0 ALLERGY STATUS TO PENICILLIN: ICD-10-CM

## 2019-03-13 DIAGNOSIS — I12.9 HYPERTENSIVE CHRONIC KIDNEY DISEASE WITH STAGE 1 THROUGH STAGE 4 CHRONIC KIDNEY DISEASE, OR UNSPECIFIED CHRONIC KIDNEY DISEASE: ICD-10-CM

## 2019-03-13 DIAGNOSIS — N39.0 URINARY TRACT INFECTION, SITE NOT SPECIFIED: ICD-10-CM

## 2019-03-13 DIAGNOSIS — E66.9 OBESITY, UNSPECIFIED: ICD-10-CM

## 2019-03-13 DIAGNOSIS — I73.9 PERIPHERAL VASCULAR DISEASE, UNSPECIFIED: ICD-10-CM

## 2019-03-13 DIAGNOSIS — E78.5 HYPERLIPIDEMIA, UNSPECIFIED: ICD-10-CM

## 2019-03-13 DIAGNOSIS — E11.65 TYPE 2 DIABETES MELLITUS WITH HYPERGLYCEMIA: ICD-10-CM

## 2019-03-13 DIAGNOSIS — N40.0 BENIGN PROSTATIC HYPERPLASIA WITHOUT LOWER URINARY TRACT SYMPTOMS: ICD-10-CM

## 2019-03-13 DIAGNOSIS — G93.41 METABOLIC ENCEPHALOPATHY: ICD-10-CM

## 2019-03-13 DIAGNOSIS — E11.22 TYPE 2 DIABETES MELLITUS WITH DIABETIC CHRONIC KIDNEY DISEASE: ICD-10-CM

## 2019-04-10 ENCOUNTER — APPOINTMENT (OUTPATIENT)
Dept: WOUND CARE | Facility: CLINIC | Age: 79
End: 2019-04-10
Payer: MEDICARE

## 2019-04-10 DIAGNOSIS — E11.628 TYPE 2 DIABETES MELLITUS WITH OTHER SKIN COMPLICATIONS: ICD-10-CM

## 2019-04-10 PROCEDURE — 99213 OFFICE O/P EST LOW 20 MIN: CPT

## 2019-04-10 NOTE — CONSULT LETTER
[Please see my note below.] : Please see my note below. [Consult Closing:] : Thank you very much for allowing me to participate in the care of this patient.  If you have any questions, please do not hesitate to contact me. [Consult Letter:] : I had the pleasure of evaluating your patient, [unfilled]. [Sincerely,] : Sincerely,

## 2019-04-11 PROBLEM — E11.628 DIABETES WITH SKIN COMPLICATION: Status: ACTIVE | Noted: 2018-06-04

## 2019-04-11 NOTE — ASSESSMENT
[FreeTextEntry1] : 77 year old male is being treated for a right distal  Achilles wounds.  No clinical signs of infection at this time.  \par Wound healed since skin sub\par continue compression stocking\par patient and daughter are pleased with his care\par s/p Apligraf previously applied prior to patient’s right Achilles. \par Patient to f/u in 1 week.\par \par

## 2019-04-11 NOTE — HISTORY OF PRESENT ILLNESS
[FreeTextEntry1] :  78 yo diabetic male with chronic wound of right Achilles area , venous disease\par Has non reconstructible arterial disease, and declines advice for f/u vascular evaluation with now a healed wound\par has used justyna and apligraf\par 7/27/18- Daughter reports definite improvement with Apligraf \par \par Currently no active issues at this time. \par \par \par

## 2019-04-11 NOTE — REASON FOR VISIT
[Follow-Up: _____] : a [unfilled] follow-up visit [Family Member] : family member [FreeTextEntry1] : 77 yr old right ankle achilles wound

## 2019-04-11 NOTE — PHYSICAL EXAM
[0] : right 0 [Alert] : alert [Ankle Swelling On The Right] : mild [] : present [JVD] : no jugular venous distention  [Ankle Swelling (On Exam)] : not present [de-identified] : ambulates with crutch, Flat affect- uses narcotic for chronic wound pain [de-identified] : non labored [FreeTextEntry1] : no overt ischemia right lower leg or foot

## 2019-05-13 ENCOUNTER — INPATIENT (INPATIENT)
Facility: HOSPITAL | Age: 79
LOS: 3 days | Discharge: HOME | End: 2019-05-17
Attending: INTERNAL MEDICINE | Admitting: INTERNAL MEDICINE
Payer: MEDICARE

## 2019-05-13 VITALS
HEART RATE: 81 BPM | RESPIRATION RATE: 18 BRPM | OXYGEN SATURATION: 98 % | WEIGHT: 190.04 LBS | DIASTOLIC BLOOD PRESSURE: 67 MMHG | TEMPERATURE: 98 F | SYSTOLIC BLOOD PRESSURE: 109 MMHG

## 2019-05-13 DIAGNOSIS — K21.9 GASTRO-ESOPHAGEAL REFLUX DISEASE WITHOUT ESOPHAGITIS: ICD-10-CM

## 2019-05-13 DIAGNOSIS — E11.9 TYPE 2 DIABETES MELLITUS WITHOUT COMPLICATIONS: ICD-10-CM

## 2019-05-13 DIAGNOSIS — R56.9 UNSPECIFIED CONVULSIONS: ICD-10-CM

## 2019-05-13 LAB
ALBUMIN SERPL ELPH-MCNC: 3.3 G/DL — SIGNIFICANT CHANGE UP (ref 3.3–5)
ALP SERPL-CCNC: 85 U/L — SIGNIFICANT CHANGE UP (ref 40–120)
ALT FLD-CCNC: 13 U/L — SIGNIFICANT CHANGE UP (ref 12–78)
ANION GAP SERPL CALC-SCNC: 9 MMOL/L — SIGNIFICANT CHANGE UP (ref 5–17)
APPEARANCE UR: CLEAR — SIGNIFICANT CHANGE UP
AST SERPL-CCNC: 13 U/L — LOW (ref 15–37)
BASOPHILS # BLD AUTO: 0.03 K/UL — SIGNIFICANT CHANGE UP (ref 0–0.2)
BASOPHILS NFR BLD AUTO: 0.5 % — SIGNIFICANT CHANGE UP (ref 0–2)
BILIRUB SERPL-MCNC: 0.3 MG/DL — SIGNIFICANT CHANGE UP (ref 0.2–1.2)
BILIRUB UR-MCNC: NEGATIVE — SIGNIFICANT CHANGE UP
BUN SERPL-MCNC: 30 MG/DL — HIGH (ref 7–23)
CALCIUM SERPL-MCNC: 9.7 MG/DL — SIGNIFICANT CHANGE UP (ref 8.5–10.1)
CHLORIDE SERPL-SCNC: 105 MMOL/L — SIGNIFICANT CHANGE UP (ref 96–108)
CO2 SERPL-SCNC: 29 MMOL/L — SIGNIFICANT CHANGE UP (ref 22–31)
COLOR SPEC: YELLOW — SIGNIFICANT CHANGE UP
CREAT SERPL-MCNC: 2.87 MG/DL — HIGH (ref 0.5–1.3)
DIFF PNL FLD: NEGATIVE — SIGNIFICANT CHANGE UP
EOSINOPHIL # BLD AUTO: 0.07 K/UL — SIGNIFICANT CHANGE UP (ref 0–0.5)
EOSINOPHIL NFR BLD AUTO: 1.2 % — SIGNIFICANT CHANGE UP (ref 0–6)
GLUCOSE BLDC GLUCOMTR-MCNC: 140 MG/DL — HIGH (ref 70–99)
GLUCOSE BLDC GLUCOMTR-MCNC: 141 MG/DL — HIGH (ref 70–99)
GLUCOSE SERPL-MCNC: 176 MG/DL — HIGH (ref 70–99)
GLUCOSE UR QL: NEGATIVE MG/DL — SIGNIFICANT CHANGE UP
HCT VFR BLD CALC: 31.6 % — LOW (ref 39–50)
HGB BLD-MCNC: 10.9 G/DL — LOW (ref 13–17)
IMM GRANULOCYTES NFR BLD AUTO: 0.3 % — SIGNIFICANT CHANGE UP (ref 0–1.5)
KETONES UR-MCNC: NEGATIVE — SIGNIFICANT CHANGE UP
LEUKOCYTE ESTERASE UR-ACNC: NEGATIVE — SIGNIFICANT CHANGE UP
LYMPHOCYTES # BLD AUTO: 1.55 K/UL — SIGNIFICANT CHANGE UP (ref 1–3.3)
LYMPHOCYTES # BLD AUTO: 26.6 % — SIGNIFICANT CHANGE UP (ref 13–44)
MCHC RBC-ENTMCNC: 30.4 PG — SIGNIFICANT CHANGE UP (ref 27–34)
MCHC RBC-ENTMCNC: 34.5 GM/DL — SIGNIFICANT CHANGE UP (ref 32–36)
MCV RBC AUTO: 88.3 FL — SIGNIFICANT CHANGE UP (ref 80–100)
MONOCYTES # BLD AUTO: 0.47 K/UL — SIGNIFICANT CHANGE UP (ref 0–0.9)
MONOCYTES NFR BLD AUTO: 8.1 % — SIGNIFICANT CHANGE UP (ref 2–14)
NEUTROPHILS # BLD AUTO: 3.69 K/UL — SIGNIFICANT CHANGE UP (ref 1.8–7.4)
NEUTROPHILS NFR BLD AUTO: 63.3 % — SIGNIFICANT CHANGE UP (ref 43–77)
NITRITE UR-MCNC: NEGATIVE — SIGNIFICANT CHANGE UP
NRBC # BLD: 0 /100 WBCS — SIGNIFICANT CHANGE UP (ref 0–0)
PH UR: 7 — SIGNIFICANT CHANGE UP (ref 5–8)
PLATELET # BLD AUTO: 256 K/UL — SIGNIFICANT CHANGE UP (ref 150–400)
POTASSIUM SERPL-MCNC: 4.4 MMOL/L — SIGNIFICANT CHANGE UP (ref 3.5–5.3)
POTASSIUM SERPL-SCNC: 4.4 MMOL/L — SIGNIFICANT CHANGE UP (ref 3.5–5.3)
PROT SERPL-MCNC: 8 GM/DL — SIGNIFICANT CHANGE UP (ref 6–8.3)
PROT UR-MCNC: NEGATIVE MG/DL — SIGNIFICANT CHANGE UP
RBC # BLD: 3.58 M/UL — LOW (ref 4.2–5.8)
RBC # FLD: 13.2 % — SIGNIFICANT CHANGE UP (ref 10.3–14.5)
SODIUM SERPL-SCNC: 143 MMOL/L — SIGNIFICANT CHANGE UP (ref 135–145)
SP GR SPEC: 1 — LOW (ref 1.01–1.02)
UROBILINOGEN FLD QL: NEGATIVE MG/DL — SIGNIFICANT CHANGE UP
WBC # BLD: 5.83 K/UL — SIGNIFICANT CHANGE UP (ref 3.8–10.5)
WBC # FLD AUTO: 5.83 K/UL — SIGNIFICANT CHANGE UP (ref 3.8–10.5)

## 2019-05-13 PROCEDURE — 70450 CT HEAD/BRAIN W/O DYE: CPT | Mod: 26

## 2019-05-13 PROCEDURE — 93010 ELECTROCARDIOGRAM REPORT: CPT

## 2019-05-13 PROCEDURE — 99285 EMERGENCY DEPT VISIT HI MDM: CPT

## 2019-05-13 RX ORDER — DEXTROSE 50 % IN WATER 50 %
25 SYRINGE (ML) INTRAVENOUS ONCE
Refills: 0 | Status: DISCONTINUED | OUTPATIENT
Start: 2019-05-13 | End: 2019-05-17

## 2019-05-13 RX ORDER — SODIUM CHLORIDE 9 MG/ML
1000 INJECTION, SOLUTION INTRAVENOUS
Refills: 0 | Status: DISCONTINUED | OUTPATIENT
Start: 2019-05-13 | End: 2019-05-17

## 2019-05-13 RX ORDER — PANTOPRAZOLE SODIUM 20 MG/1
40 TABLET, DELAYED RELEASE ORAL
Refills: 0 | Status: DISCONTINUED | OUTPATIENT
Start: 2019-05-13 | End: 2019-05-17

## 2019-05-13 RX ORDER — CILOSTAZOL 100 MG/1
100 TABLET ORAL
Refills: 0 | Status: DISCONTINUED | OUTPATIENT
Start: 2019-05-13 | End: 2019-05-17

## 2019-05-13 RX ORDER — FUROSEMIDE 40 MG
40 TABLET ORAL
Refills: 0 | Status: DISCONTINUED | OUTPATIENT
Start: 2019-05-13 | End: 2019-05-17

## 2019-05-13 RX ORDER — HEPARIN SODIUM 5000 [USP'U]/ML
5000 INJECTION INTRAVENOUS; SUBCUTANEOUS EVERY 12 HOURS
Refills: 0 | Status: DISCONTINUED | OUTPATIENT
Start: 2019-05-13 | End: 2019-05-17

## 2019-05-13 RX ORDER — DEXTROSE 50 % IN WATER 50 %
15 SYRINGE (ML) INTRAVENOUS ONCE
Refills: 0 | Status: DISCONTINUED | OUTPATIENT
Start: 2019-05-13 | End: 2019-05-17

## 2019-05-13 RX ORDER — SODIUM CHLORIDE 9 MG/ML
1000 INJECTION INTRAMUSCULAR; INTRAVENOUS; SUBCUTANEOUS ONCE
Refills: 0 | Status: COMPLETED | OUTPATIENT
Start: 2019-05-13 | End: 2019-05-13

## 2019-05-13 RX ORDER — GLUCAGON INJECTION, SOLUTION 0.5 MG/.1ML
1 INJECTION, SOLUTION SUBCUTANEOUS ONCE
Refills: 0 | Status: DISCONTINUED | OUTPATIENT
Start: 2019-05-13 | End: 2019-05-17

## 2019-05-13 RX ORDER — INSULIN LISPRO 100/ML
VIAL (ML) SUBCUTANEOUS
Refills: 0 | Status: DISCONTINUED | OUTPATIENT
Start: 2019-05-13 | End: 2019-05-17

## 2019-05-13 RX ORDER — LOSARTAN POTASSIUM 100 MG/1
100 TABLET, FILM COATED ORAL DAILY
Refills: 0 | Status: DISCONTINUED | OUTPATIENT
Start: 2019-05-13 | End: 2019-05-17

## 2019-05-13 RX ORDER — OXYBUTYNIN CHLORIDE 5 MG
5 TABLET ORAL
Refills: 0 | Status: DISCONTINUED | OUTPATIENT
Start: 2019-05-13 | End: 2019-05-17

## 2019-05-13 RX ORDER — ATENOLOL 25 MG/1
50 TABLET ORAL DAILY
Refills: 0 | Status: DISCONTINUED | OUTPATIENT
Start: 2019-05-13 | End: 2019-05-14

## 2019-05-13 RX ORDER — OXYBUTYNIN CHLORIDE 5 MG
10 TABLET ORAL DAILY
Refills: 0 | Status: DISCONTINUED | OUTPATIENT
Start: 2019-05-13 | End: 2019-05-13

## 2019-05-13 RX ORDER — ASPIRIN/CALCIUM CARB/MAGNESIUM 324 MG
81 TABLET ORAL DAILY
Refills: 0 | Status: DISCONTINUED | OUTPATIENT
Start: 2019-05-13 | End: 2019-05-17

## 2019-05-13 RX ORDER — DEXTROSE 50 % IN WATER 50 %
12.5 SYRINGE (ML) INTRAVENOUS ONCE
Refills: 0 | Status: DISCONTINUED | OUTPATIENT
Start: 2019-05-13 | End: 2019-05-17

## 2019-05-13 RX ORDER — INSULIN LISPRO 100/ML
VIAL (ML) SUBCUTANEOUS AT BEDTIME
Refills: 0 | Status: DISCONTINUED | OUTPATIENT
Start: 2019-05-13 | End: 2019-05-17

## 2019-05-13 RX ORDER — TAMSULOSIN HYDROCHLORIDE 0.4 MG/1
0.4 CAPSULE ORAL AT BEDTIME
Refills: 0 | Status: DISCONTINUED | OUTPATIENT
Start: 2019-05-13 | End: 2019-05-17

## 2019-05-13 RX ORDER — INSULIN GLARGINE 100 [IU]/ML
15 INJECTION, SOLUTION SUBCUTANEOUS AT BEDTIME
Refills: 0 | Status: DISCONTINUED | OUTPATIENT
Start: 2019-05-13 | End: 2019-05-17

## 2019-05-13 RX ORDER — DONEPEZIL HYDROCHLORIDE 10 MG/1
10 TABLET, FILM COATED ORAL AT BEDTIME
Refills: 0 | Status: DISCONTINUED | OUTPATIENT
Start: 2019-05-13 | End: 2019-05-17

## 2019-05-13 RX ADMIN — SODIUM CHLORIDE 1000 MILLILITER(S): 9 INJECTION INTRAMUSCULAR; INTRAVENOUS; SUBCUTANEOUS at 10:12

## 2019-05-13 RX ADMIN — CILOSTAZOL 100 MILLIGRAM(S): 100 TABLET ORAL at 19:29

## 2019-05-13 RX ADMIN — TAMSULOSIN HYDROCHLORIDE 0.4 MILLIGRAM(S): 0.4 CAPSULE ORAL at 21:23

## 2019-05-13 RX ADMIN — DONEPEZIL HYDROCHLORIDE 10 MILLIGRAM(S): 10 TABLET, FILM COATED ORAL at 21:23

## 2019-05-13 RX ADMIN — INSULIN GLARGINE 15 UNIT(S): 100 INJECTION, SOLUTION SUBCUTANEOUS at 21:23

## 2019-05-13 RX ADMIN — HEPARIN SODIUM 5000 UNIT(S): 5000 INJECTION INTRAVENOUS; SUBCUTANEOUS at 19:29

## 2019-05-13 RX ADMIN — Medication 40 MILLIGRAM(S): at 19:29

## 2019-05-13 RX ADMIN — Medication 5 MILLIGRAM(S): at 19:29

## 2019-05-13 NOTE — ED ADULT TRIAGE NOTE - CHIEF COMPLAINT QUOTE
Pt BIBA with c/o seizure like activity this AM x 1  known hx, A & O upon arrival, compliant with medication, alert in triage, seated while seizing no head trauma, no incontinence, no biting of the tongue,    in triage

## 2019-05-13 NOTE — H&P ADULT - ASSESSMENT
78 year old male presents today biba accompanied with his daughter who reports that he did have seizure activitiy this morning, she states that he was sitting at the table when he started to lean over, his hands started shaking and his eyes twitching lasting ten minutes, during this he was making sense at times other times not, (-) urinary or bowel incontinence (-) tongue bites

## 2019-05-13 NOTE — H&P ADULT - NSICDXFAMILYHX_GEN_ALL_CORE_FT
FAMILY HISTORY:  Mother  Still living? No  Family history of schizophrenia, Age at diagnosis: Age Unknown

## 2019-05-13 NOTE — ED PROVIDER NOTE - OBJECTIVE STATEMENT
78 year old male presents today biba accompanied with his daughter who reports that he did have seizure activitiy this morning, she states that he was sitting at the table when he started to lean over, his hands started shaking and his eyes twitching lasting ten minutes, during this he was making sense at times other times not, (-) urinary or bowel incontinence (-) tongue bites (-) headache (-) nausea or vomiting  (-) chest pain (-) sob

## 2019-05-13 NOTE — ED ADULT NURSE NOTE - OBJECTIVE STATEMENT
pt A&Ox3 with C/O seizure that last about 10 mins per daughter. pt states he does not remember anything after coming out. PMH Seizures x1 year. Diabetes, PAD, Dementia , HTN, Prostate Ca with radiation in 2010.

## 2019-05-13 NOTE — H&P ADULT - NSHPLABSRESULTS_GEN_ALL_CORE
10.9   5.83  )-----------( 256      ( 13 May 2019 09:13 )             31.6     05-13    143  |  105  |  30<H>  ----------------------------<  176<H>  4.4   |  29  |  2.87<H>    Ca    9.7      13 May 2019 09:13    TPro  8.0  /  Alb  3.3  /  TBili  0.3  /  DBili  x   /  AST  13<L>  /  ALT  13  /  AlkPhos  85  05-13

## 2019-05-14 LAB
ANION GAP SERPL CALC-SCNC: 8 MMOL/L — SIGNIFICANT CHANGE UP (ref 5–17)
BUN SERPL-MCNC: 27 MG/DL — HIGH (ref 7–23)
CALCIUM SERPL-MCNC: 9.8 MG/DL — SIGNIFICANT CHANGE UP (ref 8.5–10.1)
CHLORIDE SERPL-SCNC: 103 MMOL/L — SIGNIFICANT CHANGE UP (ref 96–108)
CO2 SERPL-SCNC: 30 MMOL/L — SIGNIFICANT CHANGE UP (ref 22–31)
CREAT SERPL-MCNC: 2.25 MG/DL — HIGH (ref 0.5–1.3)
CULTURE RESULTS: NO GROWTH — SIGNIFICANT CHANGE UP
GLUCOSE BLDC GLUCOMTR-MCNC: 126 MG/DL — HIGH (ref 70–99)
GLUCOSE BLDC GLUCOMTR-MCNC: 130 MG/DL — HIGH (ref 70–99)
GLUCOSE BLDC GLUCOMTR-MCNC: 131 MG/DL — HIGH (ref 70–99)
GLUCOSE BLDC GLUCOMTR-MCNC: 180 MG/DL — HIGH (ref 70–99)
GLUCOSE BLDC GLUCOMTR-MCNC: 39 MG/DL — CRITICAL LOW (ref 70–99)
GLUCOSE BLDC GLUCOMTR-MCNC: 45 MG/DL — CRITICAL LOW (ref 70–99)
GLUCOSE SERPL-MCNC: 67 MG/DL — LOW (ref 70–99)
HBA1C BLD-MCNC: 7.4 % — HIGH (ref 4–5.6)
HCT VFR BLD CALC: 33.6 % — LOW (ref 39–50)
HGB BLD-MCNC: 11.4 G/DL — LOW (ref 13–17)
MAGNESIUM SERPL-MCNC: 2.1 MG/DL — SIGNIFICANT CHANGE UP (ref 1.6–2.6)
MCHC RBC-ENTMCNC: 29.8 PG — SIGNIFICANT CHANGE UP (ref 27–34)
MCHC RBC-ENTMCNC: 33.9 GM/DL — SIGNIFICANT CHANGE UP (ref 32–36)
MCV RBC AUTO: 87.7 FL — SIGNIFICANT CHANGE UP (ref 80–100)
NRBC # BLD: 0 /100 WBCS — SIGNIFICANT CHANGE UP (ref 0–0)
PHOSPHATE SERPL-MCNC: 4.2 MG/DL — SIGNIFICANT CHANGE UP (ref 2.5–4.5)
PLATELET # BLD AUTO: 275 K/UL — SIGNIFICANT CHANGE UP (ref 150–400)
POTASSIUM SERPL-MCNC: 3.8 MMOL/L — SIGNIFICANT CHANGE UP (ref 3.5–5.3)
POTASSIUM SERPL-SCNC: 3.8 MMOL/L — SIGNIFICANT CHANGE UP (ref 3.5–5.3)
RBC # BLD: 3.83 M/UL — LOW (ref 4.2–5.8)
RBC # FLD: 13.1 % — SIGNIFICANT CHANGE UP (ref 10.3–14.5)
SODIUM SERPL-SCNC: 141 MMOL/L — SIGNIFICANT CHANGE UP (ref 135–145)
SPECIMEN SOURCE: SIGNIFICANT CHANGE UP
TSH SERPL-MCNC: 1.67 UIU/ML — SIGNIFICANT CHANGE UP (ref 0.36–3.74)
WBC # BLD: 6.43 K/UL — SIGNIFICANT CHANGE UP (ref 3.8–10.5)
WBC # FLD AUTO: 6.43 K/UL — SIGNIFICANT CHANGE UP (ref 3.8–10.5)

## 2019-05-14 RX ORDER — DEXTROSE 50 % IN WATER 50 %
12.5 SYRINGE (ML) INTRAVENOUS ONCE
Refills: 0 | Status: COMPLETED | OUTPATIENT
Start: 2019-05-14 | End: 2019-05-14

## 2019-05-14 RX ORDER — ATENOLOL 25 MG/1
25 TABLET ORAL DAILY
Refills: 0 | Status: DISCONTINUED | OUTPATIENT
Start: 2019-05-14 | End: 2019-05-17

## 2019-05-14 RX ADMIN — Medication 40 MILLIGRAM(S): at 18:15

## 2019-05-14 RX ADMIN — LOSARTAN POTASSIUM 100 MILLIGRAM(S): 100 TABLET, FILM COATED ORAL at 05:05

## 2019-05-14 RX ADMIN — Medication 12.5 GRAM(S): at 08:17

## 2019-05-14 RX ADMIN — PANTOPRAZOLE SODIUM 40 MILLIGRAM(S): 20 TABLET, DELAYED RELEASE ORAL at 05:06

## 2019-05-14 RX ADMIN — TAMSULOSIN HYDROCHLORIDE 0.4 MILLIGRAM(S): 0.4 CAPSULE ORAL at 22:27

## 2019-05-14 RX ADMIN — DONEPEZIL HYDROCHLORIDE 10 MILLIGRAM(S): 10 TABLET, FILM COATED ORAL at 22:27

## 2019-05-14 RX ADMIN — Medication 5 MILLIGRAM(S): at 18:16

## 2019-05-14 RX ADMIN — HEPARIN SODIUM 5000 UNIT(S): 5000 INJECTION INTRAVENOUS; SUBCUTANEOUS at 18:15

## 2019-05-14 RX ADMIN — Medication 5 MILLIGRAM(S): at 05:06

## 2019-05-14 RX ADMIN — Medication 81 MILLIGRAM(S): at 12:40

## 2019-05-14 RX ADMIN — CILOSTAZOL 100 MILLIGRAM(S): 100 TABLET ORAL at 05:06

## 2019-05-14 RX ADMIN — CILOSTAZOL 100 MILLIGRAM(S): 100 TABLET ORAL at 18:15

## 2019-05-14 RX ADMIN — Medication 40 MILLIGRAM(S): at 05:06

## 2019-05-14 RX ADMIN — INSULIN GLARGINE 15 UNIT(S): 100 INJECTION, SOLUTION SUBCUTANEOUS at 22:28

## 2019-05-14 RX ADMIN — HEPARIN SODIUM 5000 UNIT(S): 5000 INJECTION INTRAVENOUS; SUBCUTANEOUS at 05:05

## 2019-05-15 LAB
GLUCOSE BLDC GLUCOMTR-MCNC: 137 MG/DL — HIGH (ref 70–99)
GLUCOSE BLDC GLUCOMTR-MCNC: 143 MG/DL — HIGH (ref 70–99)
GLUCOSE BLDC GLUCOMTR-MCNC: 184 MG/DL — HIGH (ref 70–99)
GLUCOSE BLDC GLUCOMTR-MCNC: 240 MG/DL — HIGH (ref 70–99)

## 2019-05-15 PROCEDURE — 70551 MRI BRAIN STEM W/O DYE: CPT | Mod: 26

## 2019-05-15 RX ADMIN — TAMSULOSIN HYDROCHLORIDE 0.4 MILLIGRAM(S): 0.4 CAPSULE ORAL at 22:22

## 2019-05-15 RX ADMIN — CILOSTAZOL 100 MILLIGRAM(S): 100 TABLET ORAL at 06:28

## 2019-05-15 RX ADMIN — Medication 5 MILLIGRAM(S): at 17:20

## 2019-05-15 RX ADMIN — CILOSTAZOL 100 MILLIGRAM(S): 100 TABLET ORAL at 17:20

## 2019-05-15 RX ADMIN — DONEPEZIL HYDROCHLORIDE 10 MILLIGRAM(S): 10 TABLET, FILM COATED ORAL at 22:22

## 2019-05-15 RX ADMIN — Medication 5 MILLIGRAM(S): at 06:28

## 2019-05-15 RX ADMIN — Medication 81 MILLIGRAM(S): at 11:58

## 2019-05-15 RX ADMIN — PANTOPRAZOLE SODIUM 40 MILLIGRAM(S): 20 TABLET, DELAYED RELEASE ORAL at 06:28

## 2019-05-15 RX ADMIN — Medication 40 MILLIGRAM(S): at 17:20

## 2019-05-15 RX ADMIN — HEPARIN SODIUM 5000 UNIT(S): 5000 INJECTION INTRAVENOUS; SUBCUTANEOUS at 06:28

## 2019-05-15 RX ADMIN — INSULIN GLARGINE 15 UNIT(S): 100 INJECTION, SOLUTION SUBCUTANEOUS at 22:22

## 2019-05-15 RX ADMIN — HEPARIN SODIUM 5000 UNIT(S): 5000 INJECTION INTRAVENOUS; SUBCUTANEOUS at 17:20

## 2019-05-15 RX ADMIN — LOSARTAN POTASSIUM 100 MILLIGRAM(S): 100 TABLET, FILM COATED ORAL at 06:28

## 2019-05-15 RX ADMIN — Medication 40 MILLIGRAM(S): at 06:28

## 2019-05-15 RX ADMIN — Medication 4: at 11:56

## 2019-05-16 ENCOUNTER — TRANSCRIPTION ENCOUNTER (OUTPATIENT)
Age: 79
End: 2019-05-16

## 2019-05-16 DIAGNOSIS — N17.9 ACUTE KIDNEY FAILURE, UNSPECIFIED: ICD-10-CM

## 2019-05-16 LAB
GLUCOSE BLDC GLUCOMTR-MCNC: 149 MG/DL — HIGH (ref 70–99)
GLUCOSE BLDC GLUCOMTR-MCNC: 159 MG/DL — HIGH (ref 70–99)
GLUCOSE BLDC GLUCOMTR-MCNC: 167 MG/DL — HIGH (ref 70–99)
GLUCOSE BLDC GLUCOMTR-MCNC: 179 MG/DL — HIGH (ref 70–99)

## 2019-05-16 RX ORDER — DOXAZOSIN MESYLATE 4 MG
1 TABLET ORAL
Qty: 0 | Refills: 0 | DISCHARGE

## 2019-05-16 RX ORDER — ATENOLOL 25 MG/1
1 TABLET ORAL
Qty: 0 | Refills: 0 | DISCHARGE

## 2019-05-16 RX ORDER — LEVETIRACETAM 250 MG/1
500 TABLET, FILM COATED ORAL
Refills: 0 | Status: DISCONTINUED | OUTPATIENT
Start: 2019-05-16 | End: 2019-05-17

## 2019-05-16 RX ADMIN — Medication 2: at 13:00

## 2019-05-16 RX ADMIN — PANTOPRAZOLE SODIUM 40 MILLIGRAM(S): 20 TABLET, DELAYED RELEASE ORAL at 06:28

## 2019-05-16 RX ADMIN — Medication 2: at 18:09

## 2019-05-16 RX ADMIN — Medication 5 MILLIGRAM(S): at 18:10

## 2019-05-16 RX ADMIN — HEPARIN SODIUM 5000 UNIT(S): 5000 INJECTION INTRAVENOUS; SUBCUTANEOUS at 06:27

## 2019-05-16 RX ADMIN — DONEPEZIL HYDROCHLORIDE 10 MILLIGRAM(S): 10 TABLET, FILM COATED ORAL at 21:07

## 2019-05-16 RX ADMIN — INSULIN GLARGINE 15 UNIT(S): 100 INJECTION, SOLUTION SUBCUTANEOUS at 21:07

## 2019-05-16 RX ADMIN — LOSARTAN POTASSIUM 100 MILLIGRAM(S): 100 TABLET, FILM COATED ORAL at 06:28

## 2019-05-16 RX ADMIN — HEPARIN SODIUM 5000 UNIT(S): 5000 INJECTION INTRAVENOUS; SUBCUTANEOUS at 18:09

## 2019-05-16 RX ADMIN — LEVETIRACETAM 500 MILLIGRAM(S): 250 TABLET, FILM COATED ORAL at 15:34

## 2019-05-16 RX ADMIN — Medication 40 MILLIGRAM(S): at 06:28

## 2019-05-16 RX ADMIN — CILOSTAZOL 100 MILLIGRAM(S): 100 TABLET ORAL at 06:28

## 2019-05-16 RX ADMIN — CILOSTAZOL 100 MILLIGRAM(S): 100 TABLET ORAL at 18:10

## 2019-05-16 RX ADMIN — ATENOLOL 25 MILLIGRAM(S): 25 TABLET ORAL at 06:29

## 2019-05-16 RX ADMIN — TAMSULOSIN HYDROCHLORIDE 0.4 MILLIGRAM(S): 0.4 CAPSULE ORAL at 21:07

## 2019-05-16 RX ADMIN — Medication 5 MILLIGRAM(S): at 06:28

## 2019-05-16 RX ADMIN — Medication 81 MILLIGRAM(S): at 13:07

## 2019-05-16 NOTE — DISCHARGE NOTE PROVIDER - HOSPITAL COURSE
78 year old male presents today biba accompanied with his daughter who reports that he did have seizure activitiy

## 2019-05-16 NOTE — PHYSICAL THERAPY INITIAL EVALUATION ADULT - PERTINENT HX OF CURRENT PROBLEM, REHAB EVAL
pt admitted secondary to seizure. Pt states he falls at times when his blood sugar is low and seizure. Pt's daughter checks blood sugar 2x/wk

## 2019-05-16 NOTE — PROGRESS NOTE ADULT - PROBLEM SELECTOR PROBLEM 3
Gastroesophageal reflux disease without esophagitis
Type 2 diabetes mellitus without complication, with long-term current use of insulin

## 2019-05-16 NOTE — PROGRESS NOTE ADULT - PROBLEM SELECTOR PROBLEM 2
Type 2 diabetes mellitus without complication, with long-term current use of insulin
Gastroesophageal reflux disease without esophagitis
Type 2 diabetes mellitus without complication, with long-term current use of insulin

## 2019-05-16 NOTE — PHYSICAL THERAPY INITIAL EVALUATION ADULT - ACTIVE RANGE OF MOTION EXAMINATION, REHAB EVAL
bilateral lower extremity Active ROM was WNL (within normal limits)/sherry. upper extremity Active ROM was WNL (within normal limits)

## 2019-05-16 NOTE — PHYSICAL THERAPY INITIAL EVALUATION ADULT - BALANCE TRAINING, PT EVAL
Pt will increase static/dynamic standing balance to WFL to perform all functional mobility without LOB, by 2 weeks

## 2019-05-16 NOTE — PROGRESS NOTE ADULT - SUBJECTIVE AND OBJECTIVE BOX
Patient is a 78y old  Male who presents with a chief complaint of seizure (16 May 2019 09:55)      INTERVAL HPI/OVERNIGHT EVENTS:  pt with no complaint  MEDICATIONS  (STANDING):  aspirin  chewable 81 milliGRAM(s) Oral daily  ATENolol  Tablet 25 milliGRAM(s) Oral daily  cilostazol 100 milliGRAM(s) Oral two times a day  dextrose 5%. 1000 milliLiter(s) (50 mL/Hr) IV Continuous <Continuous>  dextrose 50% Injectable 12.5 Gram(s) IV Push once  dextrose 50% Injectable 25 Gram(s) IV Push once  dextrose 50% Injectable 25 Gram(s) IV Push once  donepezil 10 milliGRAM(s) Oral at bedtime  furosemide    Tablet 40 milliGRAM(s) Oral two times a day  heparin  Injectable 5000 Unit(s) SubCutaneous every 12 hours  insulin glargine Injectable (LANTUS) 15 Unit(s) SubCutaneous at bedtime  insulin lispro (HumaLOG) corrective regimen sliding scale   SubCutaneous three times a day before meals  insulin lispro (HumaLOG) corrective regimen sliding scale   SubCutaneous at bedtime  losartan 100 milliGRAM(s) Oral daily  oxybutynin 5 milliGRAM(s) Oral two times a day  pantoprazole    Tablet 40 milliGRAM(s) Oral before breakfast  tamsulosin 0.4 milliGRAM(s) Oral at bedtime    MEDICATIONS  (PRN):  dextrose 40% Gel 15 Gram(s) Oral once PRN Blood Glucose LESS THAN 70 milliGRAM(s)/deciliter  glucagon  Injectable 1 milliGRAM(s) IntraMuscular once PRN Glucose LESS THAN 70 milligrams/deciliter      Allergies    Cipro (Hives)  codeine (Other)  penicillins (Unknown)    Intolerances        REVIEW OF SYSTEMS:  CONSTITUTIONAL: No fever, weight loss, or fatigue  EYES: No eye pain, visual disturbances, or discharge  ENMT:  No difficulty hearing, tinnitus, vertigo; No sinus or throat pain  NECK: No pain or stiffness  BREASTS: No pain, masses, or nipple discharge  RESPIRATORY: No cough, wheezing, chills or hemoptysis; No shortness of breath  CARDIOVASCULAR: No chest pain, palpitations, dizziness, or leg swelling  GASTROINTESTINAL: No abdominal or epigastric pain. No nausea, vomiting, or hematemesis; No diarrhea or constipation. No melena or hematochezia.  GENITOURINARY: No dysuria, frequency, hematuria, or incontinence  NEUROLOGICAL: No headaches, memory loss, loss of strength, numbness, or tremors  SKIN: No itching, burning, rashes, or lesions   LYMPH NODES: No enlarged glands  ENDOCRINE: No heat or cold intolerance; No hair loss  MUSCULOSKELETAL: No joint pain or swelling; No muscle, back, or extremity pain  PSYCHIATRIC: No depression, anxiety, mood swings, or difficulty sleeping  HEME/LYMPH: No easy bruising, or bleeding gums  ALLERGY AND IMMUNOLOGIC: No hives or eczema    Vital Signs Last 24 Hrs  T(C): 36.7 (16 May 2019 12:23), Max: 36.7 (15 May 2019 17:07)  T(F): 98 (16 May 2019 12:23), Max: 98 (15 May 2019 17:07)  HR: 67 (16 May 2019 12:23) (67 - 82)  BP: 117/63 (16 May 2019 12:23) (105/67 - 131/64)  BP(mean): --  RR: 18 (16 May 2019 12:23) (18 - 18)  SpO2: 97% (16 May 2019 12:23) (97% - 99%)    PHYSICAL EXAM:  GENERAL: NAD, well-groomed, well-developed  HEAD:  Atraumatic, Normocephalic  EYES: EOMI, PERRLA, conjunctiva and sclera clear  ENMT: No tonsillar erythema, exudates, or enlargement; Moist mucous membranes, Good dentition, No lesions  NECK: Supple, No JVD, Normal thyroid  NERVOUS SYSTEM:  Alert & Oriented X3, Good concentration; Motor Strength 5/5 B/L upper and lower extremities; DTRs 2+ intact and symmetric  CHEST/LUNG: Clear to percussion bilaterally; No rales, rhonchi, wheezing, or rubs  HEART: Regular rate and rhythm; No murmurs, rubs, or gallops  ABDOMEN: Soft, Nontender, Nondistended; Bowel sounds present  EXTREMITIES:  2+ Peripheral Pulses, No clubbing, cyanosis, or edema  LYMPH: No lymphadenopathy noted  SKIN: No rashes or lesions    LABS:              CAPILLARY BLOOD GLUCOSE      POCT Blood Glucose.: 179 mg/dL (16 May 2019 12:59)  POCT Blood Glucose.: 149 mg/dL (16 May 2019 07:23)  POCT Blood Glucose.: 184 mg/dL (15 May 2019 22:19)  POCT Blood Glucose.: 137 mg/dL (15 May 2019 16:15)    CULTURES:  Culture Results:   No growth (05-13 @ 21:21)    HEMOGLOBIN A1C:    CHOLESTEROL:        RADIOLOGY & ADDITIONAL TESTS:
Patient is a 78y old  Male who presents with a chief complaint of seizure (14 May 2019 09:19)      INTERVAL HPI/OVERNIGHT EVENTS:  pt is doing better  MEDICATIONS  (STANDING):  aspirin  chewable 81 milliGRAM(s) Oral daily  ATENolol  Tablet 25 milliGRAM(s) Oral daily  cilostazol 100 milliGRAM(s) Oral two times a day  dextrose 5%. 1000 milliLiter(s) (50 mL/Hr) IV Continuous <Continuous>  dextrose 50% Injectable 12.5 Gram(s) IV Push once  dextrose 50% Injectable 25 Gram(s) IV Push once  dextrose 50% Injectable 25 Gram(s) IV Push once  donepezil 10 milliGRAM(s) Oral at bedtime  furosemide    Tablet 40 milliGRAM(s) Oral two times a day  heparin  Injectable 5000 Unit(s) SubCutaneous every 12 hours  insulin glargine Injectable (LANTUS) 15 Unit(s) SubCutaneous at bedtime  insulin lispro (HumaLOG) corrective regimen sliding scale   SubCutaneous three times a day before meals  insulin lispro (HumaLOG) corrective regimen sliding scale   SubCutaneous at bedtime  losartan 100 milliGRAM(s) Oral daily  oxybutynin 5 milliGRAM(s) Oral two times a day  pantoprazole    Tablet 40 milliGRAM(s) Oral before breakfast  tamsulosin 0.4 milliGRAM(s) Oral at bedtime    MEDICATIONS  (PRN):  dextrose 40% Gel 15 Gram(s) Oral once PRN Blood Glucose LESS THAN 70 milliGRAM(s)/deciliter  glucagon  Injectable 1 milliGRAM(s) IntraMuscular once PRN Glucose LESS THAN 70 milligrams/deciliter      Allergies    Cipro (Hives)  codeine (Other)  penicillins (Unknown)    Intolerances        REVIEW OF SYSTEMS:  CONSTITUTIONAL: No fever, weight loss, or fatigue  EYES: No eye pain, visual disturbances, or discharge  ENMT:  No difficulty hearing, tinnitus, vertigo; No sinus or throat pain  NECK: No pain or stiffness  BREASTS: No pain, masses, or nipple discharge  RESPIRATORY: No cough, wheezing, chills or hemoptysis; No shortness of breath  CARDIOVASCULAR: No chest pain, palpitations, dizziness, or leg swelling  GASTROINTESTINAL: No abdominal or epigastric pain. No nausea, vomiting, or hematemesis; No diarrhea or constipation. No melena or hematochezia.  GENITOURINARY: No dysuria, frequency, hematuria, or incontinence  NEUROLOGICAL: No headaches, memory loss, loss of strength, numbness, or tremors  SKIN: No itching, burning, rashes, or lesions   LYMPH NODES: No enlarged glands  ENDOCRINE: No heat or cold intolerance; No hair loss  MUSCULOSKELETAL: No joint pain or swelling; No muscle, back, or extremity pain  PSYCHIATRIC: No depression, anxiety, mood swings, or difficulty sleeping  HEME/LYMPH: No easy bruising, or bleeding gums  ALLERGY AND IMMUNOLOGIC: No hives or eczema    Vital Signs Last 24 Hrs  T(C): 36.7 (14 May 2019 11:55), Max: 36.7 (14 May 2019 11:55)  T(F): 98 (14 May 2019 11:55), Max: 98 (14 May 2019 11:55)  HR: 61 (14 May 2019 11:55) (50 - 74)  BP: 126/78 (14 May 2019 11:55) (110/58 - 143/84)  BP(mean): --  RR: 16 (14 May 2019 11:55) (15 - 18)  SpO2: 93% (14 May 2019 11:55) (93% - 99%)    PHYSICAL EXAM:  GENERAL: NAD, well-groomed, well-developed  HEAD:  Atraumatic, Normocephalic  EYES: EOMI, PERRLA, conjunctiva and sclera clear  ENMT: No tonsillar erythema, exudates, or enlargement; Moist mucous membranes, Good dentition, No lesions  NECK: Supple, No JVD, Normal thyroid  NERVOUS SYSTEM:  Alert & Oriented X3, Good concentration; Motor Strength 5/5 B/L upper and lower extremities; DTRs 2+ intact and symmetric  CHEST/LUNG: Clear to percussion bilaterally; No rales, rhonchi, wheezing, or rubs  HEART: Regular rate and rhythm; No murmurs, rubs, or gallops  ABDOMEN: Soft, Nontender, Nondistended; Bowel sounds present  EXTREMITIES:  2+ Peripheral Pulses, No clubbing, cyanosis, or edema  LYMPH: No lymphadenopathy noted  SKIN: No rashes or lesions    LABS:                        11.4   6.43  )-----------( 275      ( 14 May 2019 06:19 )             33.6     05-14    141  |  103  |  27<H>  ----------------------------<  67<L>  3.8   |  30  |  2.25<H>    Ca    9.8      14 May 2019 06:19  Phos  4.2       Mg     2.1         TPro  8.0  /  Alb  3.3  /  TBili  0.3  /  DBili  x   /  AST  13<L>  /  ALT  13  /  AlkPhos  85  05-      Urinalysis Basic - ( 13 May 2019 16:01 )    Color: Yellow / Appearance: Clear / S.005 / pH: x  Gluc: x / Ketone: Negative  / Bili: Negative / Urobili: Negative mg/dL   Blood: x / Protein: Negative mg/dL / Nitrite: Negative   Leuk Esterase: Negative / RBC: x / WBC x   Sq Epi: x / Non Sq Epi: x / Bacteria: x      CAPILLARY BLOOD GLUCOSE      POCT Blood Glucose.: 126 mg/dL (14 May 2019 12:39)  POCT Blood Glucose.: 131 mg/dL (14 May 2019 08:12)  POCT Blood Glucose.: 45 mg/dL (14 May 2019 08:06)  POCT Blood Glucose.: 39 mg/dL (14 May 2019 08:03)  POCT Blood Glucose.: 141 mg/dL (13 May 2019 21:21)    CULTURES:    HEMOGLOBIN A1C:  Hemoglobin A1C, Whole Blood: 7.4 % (19 @ 08:48)    CHOLESTEROL:        RADIOLOGY & ADDITIONAL TESTS:
Patient is a 78y old  Male who presents with a chief complaint of seizure (14 May 2019 13:56)      INTERVAL HPI/OVERNIGHT EVENTS:  pt is doing better  MEDICATIONS  (STANDING):  aspirin  chewable 81 milliGRAM(s) Oral daily  ATENolol  Tablet 25 milliGRAM(s) Oral daily  cilostazol 100 milliGRAM(s) Oral two times a day  dextrose 5%. 1000 milliLiter(s) (50 mL/Hr) IV Continuous <Continuous>  dextrose 50% Injectable 12.5 Gram(s) IV Push once  dextrose 50% Injectable 25 Gram(s) IV Push once  dextrose 50% Injectable 25 Gram(s) IV Push once  donepezil 10 milliGRAM(s) Oral at bedtime  furosemide    Tablet 40 milliGRAM(s) Oral two times a day  heparin  Injectable 5000 Unit(s) SubCutaneous every 12 hours  insulin glargine Injectable (LANTUS) 15 Unit(s) SubCutaneous at bedtime  insulin lispro (HumaLOG) corrective regimen sliding scale   SubCutaneous three times a day before meals  insulin lispro (HumaLOG) corrective regimen sliding scale   SubCutaneous at bedtime  losartan 100 milliGRAM(s) Oral daily  oxybutynin 5 milliGRAM(s) Oral two times a day  pantoprazole    Tablet 40 milliGRAM(s) Oral before breakfast  tamsulosin 0.4 milliGRAM(s) Oral at bedtime    MEDICATIONS  (PRN):  dextrose 40% Gel 15 Gram(s) Oral once PRN Blood Glucose LESS THAN 70 milliGRAM(s)/deciliter  glucagon  Injectable 1 milliGRAM(s) IntraMuscular once PRN Glucose LESS THAN 70 milligrams/deciliter      Allergies    Cipro (Hives)  codeine (Other)  penicillins (Unknown)    Intolerances        REVIEW OF SYSTEMS:  CONSTITUTIONAL: No fever, weight loss, or fatigue  EYES: No eye pain, visual disturbances, or discharge  ENMT:  No difficulty hearing, tinnitus, vertigo; No sinus or throat pain  NECK: No pain or stiffness  BREASTS: No pain, masses, or nipple discharge  RESPIRATORY: No cough, wheezing, chills or hemoptysis; No shortness of breath  CARDIOVASCULAR: No chest pain, palpitations, dizziness, or leg swelling  GASTROINTESTINAL: No abdominal or epigastric pain. No nausea, vomiting, or hematemesis; No diarrhea or constipation. No melena or hematochezia.  GENITOURINARY: No dysuria, frequency, hematuria, or incontinence  NEUROLOGICAL: No headaches, memory loss, loss of strength, numbness, or tremors  SKIN: No itching, burning, rashes, or lesions   LYMPH NODES: No enlarged glands  ENDOCRINE: No heat or cold intolerance; No hair loss  MUSCULOSKELETAL: No joint pain or swelling; No muscle, back, or extremity pain  PSYCHIATRIC: No depression, anxiety, mood swings, or difficulty sleeping  HEME/LYMPH: No easy bruising, or bleeding gums  ALLERGY AND IMMUNOLOGIC: No hives or eczema    Vital Signs Last 24 Hrs  T(C): 35.9 (15 May 2019 11:18), Max: 36.9 (15 May 2019 05:11)  T(F): 96.7 (15 May 2019 11:18), Max: 98.5 (15 May 2019 05:11)  HR: 71 (15 May 2019 11:18) (56 - 76)  BP: 109/61 (15 May 2019 11:18) (109/61 - 133/77)  BP(mean): --  RR: 17 (15 May 2019 11:18) (17 - 18)  SpO2: 98% (15 May 2019 11:18) (95% - 98%)    PHYSICAL EXAM:  GENERAL: NAD, well-groomed, well-developed  HEAD:  Atraumatic, Normocephalic  EYES: EOMI, PERRLA, conjunctiva and sclera clear  ENMT: No tonsillar erythema, exudates, or enlargement; Moist mucous membranes, Good dentition, No lesions  NECK: Supple, No JVD, Normal thyroid  NERVOUS SYSTEM:  Alert & Oriented X3, Good concentration; Motor Strength 5/5 B/L upper and lower extremities; DTRs 2+ intact and symmetric  CHEST/LUNG: Clear to percussion bilaterally; No rales, rhonchi, wheezing, or rubs  HEART: Regular rate and rhythm; No murmurs, rubs, or gallops  ABDOMEN: Soft, Nontender, Nondistended; Bowel sounds present  EXTREMITIES:  2+ Peripheral Pulses, No clubbing, cyanosis, or edema  LYMPH: No lymphadenopathy noted  SKIN: No rashes or lesions    LABS:                        11.4   6.43  )-----------( 275      ( 14 May 2019 06:19 )             33.6     05-14    141  |  103  |  27<H>  ----------------------------<  67<L>  3.8   |  30  |  2.25<H>    Ca    9.8      14 May 2019 06:19  Phos  4.2     -  Mg     2.1     -14        Urinalysis Basic - ( 13 May 2019 16:01 )    Color: Yellow / Appearance: Clear / S.005 / pH: x  Gluc: x / Ketone: Negative  / Bili: Negative / Urobili: Negative mg/dL   Blood: x / Protein: Negative mg/dL / Nitrite: Negative   Leuk Esterase: Negative / RBC: x / WBC x   Sq Epi: x / Non Sq Epi: x / Bacteria: x      CAPILLARY BLOOD GLUCOSE      POCT Blood Glucose.: 240 mg/dL (15 May 2019 11:15)  POCT Blood Glucose.: 143 mg/dL (15 May 2019 08:51)  POCT Blood Glucose.: 180 mg/dL (14 May 2019 22:25)  POCT Blood Glucose.: 130 mg/dL (14 May 2019 16:14)  POCT Blood Glucose.: 126 mg/dL (14 May 2019 12:39)    CULTURES:  Culture Results:   No growth ( @ 21:21)    HEMOGLOBIN A1C:    CHOLESTEROL:        RADIOLOGY & ADDITIONAL TESTS:
Subjective Complaints:  Historian:     record and patient     REVIEW OF SYSTEMS:  Eyes:  Good vision, no reported pain  ENT:  No sore throat, pain, runny nose, dysphagia  CV:  No pain, palpitatioins, hypo/hypertension  Resp:  No dyspnea, cough, tachypnea, wheezing  GI:  No pain, nausea, vomiting, diarrhea, constipatiion  Muscle:  No pain, weakness  Neuro:  No weakness, tingling, memory problems  Psych:  No fatigue, insomnia, mood problems, depression  Endocrine:  No polyuria, polydypsia, cold/heat intolerance    Vital Signs Last 24 Hrs  T(C): 36.4 (16 May 2019 05:30), Max: 36.7 (15 May 2019 17:07)  T(F): 97.5 (16 May 2019 05:30), Max: 98 (15 May 2019 17:07)  HR: 75 (16 May 2019 05:30) (71 - 82)  BP: 131/64 (16 May 2019 05:30) (105/67 - 131/64)  BP(mean): --  RR: 18 (16 May 2019 05:30) (17 - 18)  SpO2: 99% (16 May 2019 05:30) (98% - 99%)    GENERAL PHYSICAL EXAM:  General:  Appears stated age, well-groomed, well-nourished, no distress  HEENT:  NC/AT, patent nares w/ pink mucosa, OP clear w/o lesions, PERRL, EOMI, conjunctivae clear, no thyromegaly, nodules, adenopathy, no JVD  Chest:  Full & symmetric excursion, no increased effort, breath sounds clear  Cardiovascular:  Regular rhythm, S1, S2, no murmur/rub/S3/S4, no carotid/femoral/abdominal bruit, radial/pedal pulses 2+, no edema  Abdomen:  Soft, non-tender, non-distended, normoactive bowel sounds, no HSM  Extremities:  Gait & station:   Digits:   Nails:   Joints, Bones, Muscles:   ROM:   Stability:  Skin:  No rash/erythema/ecchymoses/petechiae/wounds/abscess/warm/dry  Musculoskeletal:  Full ROM in all joints w/o swelling/tenderness/effusion    NEUROLOGICAL EXAM:  HENT:  Normocephalic head; atraumatic head.  Neck supple.  ENT: normal looking.  Mental State:    Alert.  Fully oriented to person, place and date.  Coherent.  Speech clear and intact.  Cooperative.  Responds appropriately.  memory is impaired  Cranial Nerves:  II-XII:   Pupils round and reactive to light and accommodation.  Extraocular movements full.  Visual fields full (no homonymous hemianopsia).  Visual acuity wnl.  Facial symmetry intact.  Tongue midline.  Motor Functions:  Moves all extremities.  No pronator drift of UE.  Claps hand well. motor strength is 4/5    Sensory Functions:   Intact to touch and pinprick to face and extremities.    Reflexes:  Deep tendon reflexes normoactive to biceps, knees and ankles.  Babinski absent (present).  Cerebellar Testing:    Finger to nose intact.  Nystagmus absent.  Gait : unremarkable      LABS:                  RADIOLOGY & ADDITIONAL STUDIES:    POCT  Blood Glucose: 11:15 (05-15 @ 11:24)  MR Head No Cont: Urgent   Indication: cva  Transport: Stretcher-Crib  Exam Completed  Provider's Contact #: [phone] (05-15 @ 14:54)  EEG Awake or Drowsy:   Hemorrhage:No  Brain Death: No  MI:No  Sickle Cell:No   Stimulants:No  Anti-Convulsants:No   Anti-Depressants:No  Contr Substances:No   Ordering Provider's Pager/Contact #[phone] (05-15 @ 14:54)  POCT  Blood Glucose: 16:15 (05-15 @ 16:15)  POCT  Blood Glucose: 22:19 (05-15 @ 22:20)  POCT  Blood Glucose: 07:23 (05-16 @ 07:48)  DX Seizure   BRAIN MRI IS UNREMARKABLE   Recommendations:   Carotid doppler.  Echocardiogram.  EEG.   DVT prophylaxis as ordered

## 2019-05-16 NOTE — PHYSICAL THERAPY INITIAL EVALUATION ADULT - ADDITIONAL COMMENTS
As per daughter (Cydney), patient lives c her in a pvt house c 4 entry steps c B/L rails, 1 flight of stairs c R rail up to go to the basement where his bedroom and bathroom located. Independent c all ADL's and household ambulation without device. Use crutches for community ambulation.

## 2019-05-16 NOTE — DISCHARGE NOTE PROVIDER - CARE PROVIDER_API CALL
Kenneth Rivera (DO)  Internal Medicine  [address] [place] [address]  Phone: [phone]  Fax: [phone]  Follow Up Time: Kenneth Rivera (DO)  Internal Medicine  [address] [place] [address]  Phone: [phone]  Fax: [phone]  Follow Up Time:     Ban Nielson)  Neurology  [address] [facility], [place] [address]  Phone: [phone]  Fax: [phone]  Follow Up Time: 1 week

## 2019-05-16 NOTE — DISCHARGE NOTE PROVIDER - PROVIDER TOKENS
BRIEF OPERATIVE NOTE    Date of Procedure: 4/19/2018   Preoperative Diagnosis: Non-rheumatic mitral regurgitation [I34.0]  Paroxysmal atrial fibrillation (HCC) [I48.0]  Postoperative Diagnosis: Non-rheumatic mitral regurgitation [V93. 0]Non-rheumatic mitral regurgitation [I34.0]  Paroxysmal atrial fibrillation ([address] [I48.0]    Procedure(s):  MITRAL VALVE REPAIR with quadrant resection P2, primary closure and 32 mm Granado ring annuloplasty(MVR) MINIMALLY INVASIVE. CRYOABLATION OF RIGHT INTERCOSTAL SPACES  MAZE PROCEDURE WITH CRYOABLATION  MALLORY/ANES PROBE  DR GARCIA FOR ANES  Surgeon(s) and Role:     * Miguelangel Cope MD - Primary     * Jaiden Vázquez MD - Assisting         Surgical Assistant: Dr Adriel Oconnor [employer]    Surgical Staff:  Circ-1: Reina Avery RN  Circ-2: Eric Cohen  Perfusionist: Phylicia Luevano  Scrub Tech-1: Glen Ahumada  Scrub Tech-2: Dorma Lipoma  Scrub RN-1: Daren Ontiveros RN  Event Time In   Incision Start 0284   Incision Close 1403     Anesthesia: General   Estimated Blood Loss: minimal  Specimens:   ID Type Source Tests Collected by Time Destination   1 : Mitral valve anterior leaflets Preservative Mitral Valve  Miguelangel Cope MD 4/19/2018 1200 Pathology      Findings:     Complications:    Implants:   Implant Name Type Inv.  Item Serial No.  Lot No. LRB No. Used Action   RING ANNULPLSTY MITRL PHY 32MM --  - [id number]   RING ANNULPLSTY MITRL PHY 32MM --  [id number]   N/A 1 Implanted PROVIDER:[TOKEN:[[id number]]] PROVIDER:[TOKEN:[[id number]]],PROVIDER:[TOKEN:[[id number]],FOLLOWUP:[1 week]]

## 2019-05-16 NOTE — DISCHARGE NOTE PROVIDER - NSDCCPCAREPLAN_GEN_ALL_CORE_FT
PRINCIPAL DISCHARGE DIAGNOSIS  Diagnosis: Seizure  Assessment and Plan of Treatment:       SECONDARY DISCHARGE DIAGNOSES  Diagnosis: Gastroesophageal reflux disease without esophagitis  Assessment and Plan of Treatment: Gastroesophageal reflux disease without esophagitis    Diagnosis: Gastroesophageal reflux disease without esophagitis  Assessment and Plan of Treatment: Gastroesophageal reflux disease without esophagitis    Diagnosis: Type 2 diabetes mellitus without complication, with long-term current use of insulin  Assessment and Plan of Treatment: Type 2 diabetes mellitus without complication, with long-term current use of insulin

## 2019-05-17 ENCOUNTER — TRANSCRIPTION ENCOUNTER (OUTPATIENT)
Age: 79
End: 2019-05-17

## 2019-05-17 VITALS
OXYGEN SATURATION: 96 % | TEMPERATURE: 97 F | SYSTOLIC BLOOD PRESSURE: 109 MMHG | DIASTOLIC BLOOD PRESSURE: 67 MMHG | RESPIRATION RATE: 17 BRPM | HEART RATE: 64 BPM

## 2019-05-17 LAB
ANION GAP SERPL CALC-SCNC: 11 MMOL/L — SIGNIFICANT CHANGE UP (ref 5–17)
BUN SERPL-MCNC: 57 MG/DL — HIGH (ref 7–23)
CALCIUM SERPL-MCNC: 9.8 MG/DL — SIGNIFICANT CHANGE UP (ref 8.5–10.1)
CHLORIDE SERPL-SCNC: 104 MMOL/L — SIGNIFICANT CHANGE UP (ref 96–108)
CO2 SERPL-SCNC: 24 MMOL/L — SIGNIFICANT CHANGE UP (ref 22–31)
CREAT SERPL-MCNC: 2.95 MG/DL — HIGH (ref 0.5–1.3)
GLUCOSE BLDC GLUCOMTR-MCNC: 130 MG/DL — HIGH (ref 70–99)
GLUCOSE BLDC GLUCOMTR-MCNC: 160 MG/DL — HIGH (ref 70–99)
GLUCOSE SERPL-MCNC: 148 MG/DL — HIGH (ref 70–99)
HCT VFR BLD CALC: 35.2 % — LOW (ref 39–50)
HGB BLD-MCNC: 12.1 G/DL — LOW (ref 13–17)
MCHC RBC-ENTMCNC: 29.9 PG — SIGNIFICANT CHANGE UP (ref 27–34)
MCHC RBC-ENTMCNC: 34.4 GM/DL — SIGNIFICANT CHANGE UP (ref 32–36)
MCV RBC AUTO: 86.9 FL — SIGNIFICANT CHANGE UP (ref 80–100)
NRBC # BLD: 0 /100 WBCS — SIGNIFICANT CHANGE UP (ref 0–0)
PLATELET # BLD AUTO: 276 K/UL — SIGNIFICANT CHANGE UP (ref 150–400)
POTASSIUM SERPL-MCNC: 4.4 MMOL/L — SIGNIFICANT CHANGE UP (ref 3.5–5.3)
POTASSIUM SERPL-SCNC: 4.4 MMOL/L — SIGNIFICANT CHANGE UP (ref 3.5–5.3)
RBC # BLD: 4.05 M/UL — LOW (ref 4.2–5.8)
RBC # FLD: 13.1 % — SIGNIFICANT CHANGE UP (ref 10.3–14.5)
SODIUM SERPL-SCNC: 139 MMOL/L — SIGNIFICANT CHANGE UP (ref 135–145)
WBC # BLD: 6.05 K/UL — SIGNIFICANT CHANGE UP (ref 3.8–10.5)
WBC # FLD AUTO: 6.05 K/UL — SIGNIFICANT CHANGE UP (ref 3.8–10.5)

## 2019-05-17 RX ORDER — TAMSULOSIN HYDROCHLORIDE 0.4 MG/1
1 CAPSULE ORAL
Qty: 0 | Refills: 0 | DISCHARGE
Start: 2019-05-17

## 2019-05-17 RX ORDER — ATENOLOL 25 MG/1
1 TABLET ORAL
Qty: 0 | Refills: 0 | DISCHARGE
Start: 2019-05-17

## 2019-05-17 RX ORDER — LEVETIRACETAM 250 MG/1
1 TABLET, FILM COATED ORAL
Qty: 60 | Refills: 0
Start: 2019-05-17 | End: 2019-06-15

## 2019-05-17 RX ADMIN — LEVETIRACETAM 500 MILLIGRAM(S): 250 TABLET, FILM COATED ORAL at 05:34

## 2019-05-17 RX ADMIN — Medication 40 MILLIGRAM(S): at 05:34

## 2019-05-17 RX ADMIN — Medication 2: at 11:50

## 2019-05-17 RX ADMIN — ATENOLOL 25 MILLIGRAM(S): 25 TABLET ORAL at 05:32

## 2019-05-17 RX ADMIN — Medication 81 MILLIGRAM(S): at 11:50

## 2019-05-17 RX ADMIN — Medication 5 MILLIGRAM(S): at 05:35

## 2019-05-17 RX ADMIN — PANTOPRAZOLE SODIUM 40 MILLIGRAM(S): 20 TABLET, DELAYED RELEASE ORAL at 08:33

## 2019-05-17 RX ADMIN — HEPARIN SODIUM 5000 UNIT(S): 5000 INJECTION INTRAVENOUS; SUBCUTANEOUS at 05:35

## 2019-05-17 RX ADMIN — CILOSTAZOL 100 MILLIGRAM(S): 100 TABLET ORAL at 05:34

## 2019-05-17 RX ADMIN — LOSARTAN POTASSIUM 100 MILLIGRAM(S): 100 TABLET, FILM COATED ORAL at 05:34

## 2019-05-17 NOTE — DISCHARGE NOTE NURSING/CASE MANAGEMENT/SOCIAL WORK - NSDCDPATPORTLINK_GEN_ALL_CORE
You can access the [facility] Patient Portal, offered by [facility], by registering with the following website: [email or web address]

## 2019-05-22 DIAGNOSIS — Z88.0 ALLERGY STATUS TO PENICILLIN: ICD-10-CM

## 2019-05-22 DIAGNOSIS — Z79.82 LONG TERM (CURRENT) USE OF ASPIRIN: ICD-10-CM

## 2019-05-22 DIAGNOSIS — Z79.4 LONG TERM (CURRENT) USE OF INSULIN: ICD-10-CM

## 2019-05-22 DIAGNOSIS — N40.0 BENIGN PROSTATIC HYPERPLASIA WITHOUT LOWER URINARY TRACT SYMPTOMS: ICD-10-CM

## 2019-05-22 DIAGNOSIS — K21.9 GASTRO-ESOPHAGEAL REFLUX DISEASE WITHOUT ESOPHAGITIS: ICD-10-CM

## 2019-05-22 DIAGNOSIS — Z88.1 ALLERGY STATUS TO OTHER ANTIBIOTIC AGENTS STATUS: ICD-10-CM

## 2019-05-22 DIAGNOSIS — E11.51 TYPE 2 DIABETES MELLITUS WITH DIABETIC PERIPHERAL ANGIOPATHY WITHOUT GANGRENE: ICD-10-CM

## 2019-05-22 DIAGNOSIS — E78.5 HYPERLIPIDEMIA, UNSPECIFIED: ICD-10-CM

## 2019-05-22 DIAGNOSIS — I10 ESSENTIAL (PRIMARY) HYPERTENSION: ICD-10-CM

## 2019-05-22 DIAGNOSIS — N17.9 ACUTE KIDNEY FAILURE, UNSPECIFIED: ICD-10-CM

## 2019-05-22 DIAGNOSIS — R56.9 UNSPECIFIED CONVULSIONS: ICD-10-CM

## 2019-05-22 DIAGNOSIS — Z88.5 ALLERGY STATUS TO NARCOTIC AGENT: ICD-10-CM

## 2019-06-03 ENCOUNTER — EMERGENCY (EMERGENCY)
Facility: HOSPITAL | Age: 79
LOS: 0 days | Discharge: HOME | End: 2019-06-03
Attending: EMERGENCY MEDICINE
Payer: MEDICARE

## 2019-06-03 VITALS
WEIGHT: 190.04 LBS | SYSTOLIC BLOOD PRESSURE: 90 MMHG | HEIGHT: 68 IN | TEMPERATURE: 98 F | HEART RATE: 71 BPM | RESPIRATION RATE: 19 BRPM | DIASTOLIC BLOOD PRESSURE: 46 MMHG | OXYGEN SATURATION: 95 %

## 2019-06-03 VITALS
OXYGEN SATURATION: 100 % | TEMPERATURE: 97 F | HEART RATE: 55 BPM | SYSTOLIC BLOOD PRESSURE: 141 MMHG | DIASTOLIC BLOOD PRESSURE: 77 MMHG | RESPIRATION RATE: 18 BRPM

## 2019-06-03 DIAGNOSIS — G40.909 EPILEPSY, UNSPECIFIED, NOT INTRACTABLE, WITHOUT STATUS EPILEPTICUS: ICD-10-CM

## 2019-06-03 DIAGNOSIS — Z88.0 ALLERGY STATUS TO PENICILLIN: ICD-10-CM

## 2019-06-03 LAB
BASOPHILS # BLD AUTO: 0.04 K/UL — SIGNIFICANT CHANGE UP (ref 0–0.2)
BASOPHILS NFR BLD AUTO: 0.6 % — SIGNIFICANT CHANGE UP (ref 0–2)
EOSINOPHIL # BLD AUTO: 0.15 K/UL — SIGNIFICANT CHANGE UP (ref 0–0.5)
EOSINOPHIL NFR BLD AUTO: 2.3 % — SIGNIFICANT CHANGE UP (ref 0–6)
GLUCOSE BLDC GLUCOMTR-MCNC: 190 MG/DL — HIGH (ref 70–99)
HCT VFR BLD CALC: 32.9 % — LOW (ref 39–50)
HGB BLD-MCNC: 11.3 G/DL — LOW (ref 13–17)
IMM GRANULOCYTES NFR BLD AUTO: 0.2 % — SIGNIFICANT CHANGE UP (ref 0–1.5)
LYMPHOCYTES # BLD AUTO: 1.82 K/UL — SIGNIFICANT CHANGE UP (ref 1–3.3)
LYMPHOCYTES # BLD AUTO: 28.2 % — SIGNIFICANT CHANGE UP (ref 13–44)
MCHC RBC-ENTMCNC: 29.8 PG — SIGNIFICANT CHANGE UP (ref 27–34)
MCHC RBC-ENTMCNC: 34.3 GM/DL — SIGNIFICANT CHANGE UP (ref 32–36)
MCV RBC AUTO: 86.8 FL — SIGNIFICANT CHANGE UP (ref 80–100)
MONOCYTES # BLD AUTO: 0.47 K/UL — SIGNIFICANT CHANGE UP (ref 0–0.9)
MONOCYTES NFR BLD AUTO: 7.3 % — SIGNIFICANT CHANGE UP (ref 2–14)
NEUTROPHILS # BLD AUTO: 3.97 K/UL — SIGNIFICANT CHANGE UP (ref 1.8–7.4)
NEUTROPHILS NFR BLD AUTO: 61.4 % — SIGNIFICANT CHANGE UP (ref 43–77)
NRBC # BLD: 0 /100 WBCS — SIGNIFICANT CHANGE UP (ref 0–0)
PLATELET # BLD AUTO: 225 K/UL — SIGNIFICANT CHANGE UP (ref 150–400)
RBC # BLD: 3.79 M/UL — LOW (ref 4.2–5.8)
RBC # FLD: 13.2 % — SIGNIFICANT CHANGE UP (ref 10.3–14.5)
WBC # BLD: 6.46 K/UL — SIGNIFICANT CHANGE UP (ref 3.8–10.5)
WBC # FLD AUTO: 6.46 K/UL — SIGNIFICANT CHANGE UP (ref 3.8–10.5)

## 2019-06-03 PROCEDURE — 99284 EMERGENCY DEPT VISIT MOD MDM: CPT

## 2019-06-03 RX ORDER — LEVETIRACETAM 250 MG/1
500 TABLET, FILM COATED ORAL ONCE
Refills: 0 | Status: DISCONTINUED | OUTPATIENT
Start: 2019-06-03 | End: 2019-06-03

## 2019-06-03 NOTE — ED ADULT NURSE NOTE - OBJECTIVE STATEMENT
Patient had seizure this morning after breakfast. Daughter states he fell off chair and is unsure if patient hit his head. Patient has Hx of DM,PAD,HTN dementia. Wounds on left heel and right ankle. Denies n/v. denies chest pain.

## 2019-06-03 NOTE — ED PROVIDER NOTE - OBJECTIVE STATEMENT
Pt is a 77 yo gentleman with a pmhx of HTN, DM, szr disorder, dementia who presents to the ED with a seizure today. Lasted several minutes, similar to usual seizure semiology. Back to baseline now. Has been compliant with medications. No fevers, no cough, no dysuria. No head strike.

## 2019-06-03 NOTE — ED PROVIDER NOTE - CLINICAL SUMMARY MEDICAL DECISION MAKING FREE TEXT BOX
Ddx: normal seizure/ no evidence of fevers to suggest pna or uti  Plan: observe, cbc, cmp, keppra, f/u w neurology

## 2019-06-03 NOTE — ED ADULT TRIAGE NOTE - CHIEF COMPLAINT QUOTE
patient BIBA , patient had an episode of seizure today , fall from the chair hit his head , denied headache denied N/V denied dizziness no chest pain , A&Ox3 at the time of triage

## 2019-06-03 NOTE — ED ADULT NURSE NOTE - NSIMPLEMENTINTERV_GEN_ALL_ED
Implemented All Fall Risk Interventions:  [place] to call system. Call bell, personal items and telephone within reach. Instruct patient to call for assistance. Room bathroom lighting operational. Non-slip footwear when patient is off stretcher. Physically safe environment: no spills, clutter or unnecessary equipment. Stretcher in lowest position, wheels locked, appropriate side rails in place. Provide visual cue, wrist band, yellow gown, etc. Monitor gait and stability. Monitor for mental status changes and reorient to person, place, and time. Review medications for side effects contributing to fall risk. Reinforce activity limits and safety measures with patient and family.

## 2019-07-01 ENCOUNTER — APPOINTMENT (OUTPATIENT)
Dept: WOUND CARE | Facility: CLINIC | Age: 79
End: 2019-07-01
Payer: MEDICARE

## 2019-07-01 ENCOUNTER — OUTPATIENT (OUTPATIENT)
Dept: OUTPATIENT SERVICES | Facility: HOSPITAL | Age: 79
LOS: 1 days | End: 2019-07-01
Payer: MEDICARE

## 2019-07-01 DIAGNOSIS — I87.313 CHRONIC VENOUS HYPERTENSION (IDIOPATHIC) WITH ULCER OF BILATERAL LOWER EXTREMITY: ICD-10-CM

## 2019-07-01 DIAGNOSIS — L97.919 CHRONIC VENOUS HYPERTENSION (IDIOPATHIC) WITH ULCER OF BILATERAL LOWER EXTREMITY: ICD-10-CM

## 2019-07-01 DIAGNOSIS — I70.229 ATHEROSCLEROSIS OF NATIVE ARTERIES OF EXTREMITIES WITH REST PAIN, UNSPECIFIED EXTREMITY: ICD-10-CM

## 2019-07-01 DIAGNOSIS — L97.929 CHRONIC VENOUS HYPERTENSION (IDIOPATHIC) WITH ULCER OF BILATERAL LOWER EXTREMITY: ICD-10-CM

## 2019-07-01 PROCEDURE — G9001: CPT

## 2019-07-01 PROCEDURE — 11042 DBRDMT SUBQ TIS 1ST 20SQCM/<: CPT

## 2019-07-01 NOTE — PHYSICAL EXAM
[0] : right 0 [] : present [Ankle Swelling On The Right] : mild [Alert] : alert [JVD] : no jugular venous distention  [Ankle Swelling (On Exam)] : not present [de-identified] : ambulates with crutch, Flat affect- uses narcotic for chronic wound pain [de-identified] : non labored [FreeTextEntry1] : medial calf [FreeTextEntry2] : 2 [FreeTextEntry3] : 0.6 [FreeTextEntry4] : 0.2 [FreeTextEntry5] : curette [de-identified] : skin and subcutaneous [de-identified] : justyna

## 2019-07-01 NOTE — ASSESSMENT
[FreeTextEntry1] : 77 year old male is being treated for a right distal  Achilles wounds.  No clinical signs of infection at this time.  \par \par Small opening right medial lower calf, debrided, justyna, compression boot\par Plan - has nurse orders given, justyna over wound, compression boot

## 2019-07-08 DIAGNOSIS — Z71.89 OTHER SPECIFIED COUNSELING: ICD-10-CM

## 2019-07-25 ENCOUNTER — APPOINTMENT (OUTPATIENT)
Dept: VASCULAR SURGERY | Facility: CLINIC | Age: 79
End: 2019-07-25
Payer: MEDICARE

## 2019-07-25 VITALS
DIASTOLIC BLOOD PRESSURE: 81 MMHG | HEIGHT: 68 IN | WEIGHT: 189 LBS | SYSTOLIC BLOOD PRESSURE: 147 MMHG | HEART RATE: 59 BPM | BODY MASS INDEX: 28.64 KG/M2

## 2019-07-25 VITALS — DIASTOLIC BLOOD PRESSURE: 74 MMHG | HEART RATE: 62 BPM | SYSTOLIC BLOOD PRESSURE: 132 MMHG

## 2019-07-25 VITALS — TEMPERATURE: 97.8 F

## 2019-07-25 PROCEDURE — 99212 OFFICE O/P EST SF 10 MIN: CPT

## 2019-07-25 PROCEDURE — 93923 UPR/LXTR ART STDY 3+ LVLS: CPT

## 2019-07-25 PROCEDURE — 93971 EXTREMITY STUDY: CPT

## 2019-07-26 NOTE — ASSESSMENT
[FreeTextEntry1] : 79 year old male presents today for RLE wound which has been present for the past 3 months. Denies any trauma/injury, fever or chills. He has had ulcers in that leg in the past which resolved with local wound care and compression dressings. \par \par Patient was last seen in 3/2016 for LLE arterial wounds. Diagnostic angiogram with no revascularization options. Patient was referred back to wound care. Since then, the LLE wounds have healed. \par \par PMH: HTN, DM, prostate CA, LE venous stasis disease, non-smoker\par \par Legs warm and well perfused. 1cm wound just above right medial malleolus, no significant discharge, no s/s of infection. Ankle swelling and stasis present. \par \par CRISTIN today as follows R 0.85 L 0.64. \par RLE VLE negative for DVT. Deep reflux present in common femoral to popliteal vein. Reflux also seen in R GSV, however, vein is small in caliber. \par \par Recommend continuing with local wound care and conservative measures including leg elevation and CS. He is scheduled for dressing changes tomorrow. CRISTIN acceptable. No endovenous interventions at this time. Follow up as needed.

## 2019-07-26 NOTE — HISTORY OF PRESENT ILLNESS
[FreeTextEntry1] : 79 year old male presents today for RLE wound which has been present for the past 3 months. Denies any trauma/injury, fever or chills. He has had ulcers in that leg in the past which resolved with local wound care and compression dressings. \par \par Patient was last seen in 3/2016 for LLE arterial wounds. Diagnostic angiogram with no revascularization options. Patient was referred back to wound care. Since then, the LLE wounds have healed. \par \par PMH: HTN, DM, prostate CA, LE venous stasis disease, non-smoker

## 2019-07-26 NOTE — PHYSICAL EXAM
[2+] : left 2+ [0] : right 0 [Alert] : alert [Calm] : calm [de-identified] : NAD [de-identified] : unlabored [FreeTextEntry1] : Legs warm and well perfused. 1cm wound just above medial malleolus, no significant discharge, no s/s of infection. Ankle swelling and stasis present.

## 2019-07-26 NOTE — REVIEW OF SYSTEMS
[Skin Wound] : skin wound [Lower Ext Edema] : lower extremity edema [Negative] : Constitutional [Limb Weakness] : no limb weakness [Difficulty Walking] : no difficulty walking

## 2019-08-02 ENCOUNTER — APPOINTMENT (OUTPATIENT)
Dept: WOUND CARE | Facility: CLINIC | Age: 79
End: 2019-08-02
Payer: MEDICARE

## 2019-08-02 DIAGNOSIS — L97.522 NON-PRESSURE CHRONIC ULCER OF OTHER PART OF LEFT FOOT WITH FAT LAYER EXPOSED: ICD-10-CM

## 2019-08-02 PROCEDURE — 11042 DBRDMT SUBQ TIS 1ST 20SQCM/<: CPT

## 2019-08-04 PROBLEM — L97.522 CHRONIC FOOT ULCER, LEFT, WITH FAT LAYER EXPOSED: Status: ACTIVE | Noted: 2017-05-19

## 2019-08-04 NOTE — REASON FOR VISIT
[Family Member] : family member [Follow-Up: _____] : a [unfilled] follow-up visit [FreeTextEntry1] : 77 yr old right ankle achilles wound

## 2019-08-04 NOTE — PHYSICAL EXAM
[0] : right 0 [] : present [Ankle Swelling On The Right] : mild [Alert] : alert [JVD] : no jugular venous distention  [Ankle Swelling (On Exam)] : not present [de-identified] : ambulates with crutch, Flat affect- uses narcotic for chronic wound pain [de-identified] : non labored [FreeTextEntry1] : medial calf [FreeTextEntry3] : 0.5 [FreeTextEntry2] : 1.6 [FreeTextEntry4] : 0.2 [FreeTextEntry5] : curette [de-identified] : skin and subcutaneous [de-identified] : justyna

## 2019-08-28 ENCOUNTER — APPOINTMENT (OUTPATIENT)
Dept: WOUND CARE | Facility: CLINIC | Age: 79
End: 2019-08-28
Payer: MEDICARE

## 2019-08-28 VITALS
BODY MASS INDEX: 28.79 KG/M2 | DIASTOLIC BLOOD PRESSURE: 82 MMHG | HEIGHT: 68 IN | HEART RATE: 88 BPM | SYSTOLIC BLOOD PRESSURE: 150 MMHG | WEIGHT: 190 LBS

## 2019-08-28 DIAGNOSIS — T14.90XA INJURY, UNSPECIFIED, INITIAL ENCOUNTER: ICD-10-CM

## 2019-08-28 DIAGNOSIS — E11.621 TYPE 2 DIABETES MELLITUS WITH FOOT ULCER: ICD-10-CM

## 2019-08-28 DIAGNOSIS — L97.509 TYPE 2 DIABETES MELLITUS WITH FOOT ULCER: ICD-10-CM

## 2019-08-28 DIAGNOSIS — S81.801D UNSPECIFIED OPEN WOUND, RIGHT LOWER LEG, SUBSEQUENT ENCOUNTER: ICD-10-CM

## 2019-08-28 PROCEDURE — 99213 OFFICE O/P EST LOW 20 MIN: CPT

## 2019-08-28 NOTE — ASSESSMENT
[FreeTextEntry1] : 77 year old male is being treated for a right distal  Achilles wounds.  No clinical signs of infection at this time.  \par \par Small opening right medial lower calf, debrided, justyna, compression boot\par Plan - has nurse orders given, justyna over wound, compression boot\par 8/28- wounds of right ankle are closed\par Will apply Cavilon , and support hose \par Understand possibility of recurrent wound given VI right GSV

## 2019-08-28 NOTE — CONSULT LETTER
[Consult Letter:] : I had the pleasure of evaluating your patient, [unfilled]. [Please see my note below.] : Please see my note below. [Sincerely,] : Sincerely, [Consult Closing:] : Thank you very much for allowing me to participate in the care of this patient.  If you have any questions, please do not hesitate to contact me.

## 2019-08-28 NOTE — HISTORY OF PRESENT ILLNESS
[FreeTextEntry1] :  78 yo diabetic male with chronic wound of right Achilles area , venous disease\par Has non reconstructible arterial disease, and declines advice for f/u vascular evaluation with now a healed wound\par has used justyna and apligraf\par 7/27/18- Daughter reports definite improvement with Apligraf \par \par Currently no active issues at this time.\par 8/28- discussed vascular eval - GL \par \par \par

## 2019-08-28 NOTE — PHYSICAL EXAM
[0] : right 0 [] : present [Alert] : alert [Ankle Swelling On The Right] : mild [JVD] : no jugular venous distention  [Ankle Swelling (On Exam)] : not present [de-identified] : ambulates with crutch, Flat affect- uses narcotic for chronic wound pain [de-identified] : non labored [de-identified] : ambulatory with crutch [FreeTextEntry1] : right medial calf HEALED [FreeTextEntry5] : simone  [de-identified] : CAVILON [de-identified] : 1.6/0.5 [FreeTextEntry7] : Achilles - healed [de-identified] : Cavilon [TWNoteComboBox1] : Right [TWNoteComboBox4] : False [TWNoteComboBox5] : False [TWNoteComboBox6] : False [TWNoteComboBox7] : Jeanie [de-identified] : False [de-identified] : False [TWNoteComboBox9] : Right

## 2019-09-30 NOTE — PATIENT PROFILE ADULT - INFORMATION PROVIDED TO:
[facility] Ins Benefits as of 9/30/2019    Member: JENNIFER BUSH  YOB: 2014  Subscriber ID: [id number]  Product Name:   Plan Code: [id number]  Please Note: Member must be eligible at date of service to receive benefit.  In Network Coverage Frequency  Category Benefit Eligibility Frequency  Exam Available 1 every 12 month(s)  Selection Contact Lens Fit Available 1 every 12 month(s)  Frame Available 1 every 12 month(s)  Lenses Available 1 every 12 month(s)  EHB Selection Contact Lenses - Biweekly Wearï Available Every 12 month(s)  EHB Selection Contact Lenses - Monthly Wearï Available Every 12 month(s)   Dilated Fundus Exam: Patient History Currently Unavailable  ï Contact Lenses are in Lieu of Eyeglasses  In Network Coverage  [employer] Patient Responsibility  (includes applicable copay)  Professional Services  Exam $10.00  Selection Contact Lens Fit Covered-in-Full for Ages 0-18  
patient

## 2020-01-12 NOTE — ED ADULT NURSE NOTE - NEURO SENSATION
OT IRP Treatment      Primary Rehabilitation Diagnosis: Non-traumatic Brain dysfunction, glioblastoma  Expected Discharge Date: 01/21/20  Planned Discharge Destination: Home    SUBJECTIVE: Subjective: Pt is agreeable to therapy session. (01/12/20 1300)  Subjective/Objective Comments: Pt awaiting transport back to room via rehab aide. (01/12/20 1300)    OBJECTIVE:  Precautions  Seizure Precautions: Yes (01/06/20 0700)  Other Precautions: left contraversive pushing with fatigue, shoes and LLE NDT splint on with activity  (01/03/20 0900)  Precautions Comments: left inattention  (01/05/20 1502)    See below for current functional status overview.  See OT flowsheet for full details regarding the OT therapy provided.    ASSESSMENT:   Treatment today focused on LUE neuromuscular reeducation, pivot transfers (see flowsheet for details). Patient limited at this time by attention to task, left sided weakess.  Patient will benefit from further skilled OT  for continued training with functional transfer/mobility training, neuromuscular reeducation, and ADL retraining to help the patient meet goal of maximizing functional independence.    OT Identified Barriers to Discharge: balance, cognitive changes, left inattention, proprioceptive and sensory deficits, coordination     This patient participated in all scheduled occupational therapy time with this therapist today.    EDUCATION:   On this date, education was provided to patient regarding  upper extremity facilitation  The response to education was/were: Needs reinforcement    PLAN:   Continue skilled OT, including the following Treatment Interventions:  retraining;UE strengthening/ROM;Endurance training;Patient/Family training;Neuro muscular reeducation (01/06/20 1400)   OT Frequency: 7 days/week (01/10/20 0900), Frequency Comments: 60 min per day at least 5 days per week (01/10/20 0900)    Treatment Plan for Next Session: AM: shower or sponge bathing at sink, dressing PM:  KASSANDRA neuro re-education/grasp and release, standing balance/tolerance,            RECOMMENDATIONS FOR DISCHARGE:  Recommendations for Discharge: OT WI: Home therapy    PT/OT Mobility Equipment for Discharge: will benefit from manual wheelchair (LMN completed 1/9), owns SPC (01/10/20 1400)  PT/OT ADL Equipment for Discharge: Pt wife purchasing ETB. Pt has commode and son installing grab bars by shower and toliet  (01/10/20 1400)      FUNCTIONAL DATA OVERVIEW LAST 24 HOURS  ADLs   Self Cares/ADL's  Self Cares/ADL's Comments #1: ADLs completed with nursing staff this AM. (01/12/20 0930)    Household mobility  Household Mobility  Supine to Sit: Supervision (01/12/20 1300)  Sit to Supine: Supervision (01/12/20 1300)  Sit to Stand: Partial/Moderate Assistance (01/12/20 1300)  Stand to Sit: Partial/Moderate Assistance (01/12/20 1300)  Stand Pivot Transfers: Partial/Moderate Assistance (01/12/20 1300)  Transfer Equipment: gait belt, no AD (01/12/20 1300)  Sitting - Static: Modified Independent (01/12/20 1300)  Sitting - Dynamic: Supervision (01/12/20 1300)  Standing - Static: Partial/Moderate Assistance (01/12/20 1300)  Standing - Dynamic: Partial/Moderate Assistance (01/12/20 1300)  Household Mobility Comments #1: Pt required partial/mod assist to perform sit to stand, stand pivot transfer from w/c <> mat table, w/c to bed to the right. Pt unable to motor plan pivot with steps to the left, therefore w/c was rearranged to perform pivot to left. Pt fatigued after PT session this AM. (01/12/20 0930)  Household Mobility Comments #2: Pt required partial/mod assist to perform sit to stand, stand pivot transfer from w/c <> mat table, supervision for safety with supine <> sitting on mat table,  (01/12/20 1300)    Home Management       Tolerance  OT Activity Tolerance  Activity Tolerance: 1:1 Activity to rest (01/12/20 1300)  Activity Tolerance Comments: fair (01/12/20 1300)    Cognition       Interventions  Other Interventions 1:  While sitting on mat table unsupported with mirror for feedback, performed scapular elevation/depression, scapular protraction/retraction with assistance, 2 sets x 10 reps each. Pt tolerated 10 reps scapular mobilizations while seated on mat table but has a very difficult time focusing on task long enough for carry over. Performed 3 sets x 10 reps forward shoulder flexion/extension with PVC pipe frame with rest in between. Pt performed lateral trunk flexion 4 sets x 5 reps each but pt complaining of buttock pain (\"like when you sit on the toilet for an hour\"), focusing on midline orientation with max cues. Overall, pt with difficulty attending to tasks and orienting to midline. (01/12/20 0930)  Other Interventions 2: While seated on mat table, performed 2 sets x 10 reps forward trunk flexion on large yoga ball, focusing on sustained shoulder stretch and gentle weight bearing through LUE. Pt then begins to perseverate on medial groin/upper quad pain. Attempted several stretches, including forward trunk flexion towards the floor (sustained hold). Due to patient's pushing tendencies and poor midline orientation, suspect patient is weight bearing through LLE and is firing his medial quad/groin. Attempted supine stretching per patient request with little relief. Suggested trying warm blanket/heating pad-pt receptive. While supine, performed self ROM shoulder flexion/extension towards ceiling along with shoulder abduction/adduction, 2 sets x 10 reps each with assist from OT writer. Performed sidelying scapular mobilizations 2 sets x 10 reps each with good activity tolerance. Pt with improvements in strength in LUE but continues to have proprioceptive deficits. (01/12/20 1300)        sensory intact

## 2020-01-13 NOTE — H&P ADULT - NSHPREVIEWOFSYSTEMS_GEN_ALL_CORE
CONSTITUTIONAL: No fever, weight loss, or fatigue  EYES: No eye pain, visual disturbances, or discharge  ENMT:  No difficulty hearing, tinnitus, vertigo; No sinus or throat pain  NECK: No pain or stiffness  BREASTS: No pain, masses, or nipple discharge  RESPIRATORY: No cough, wheezing, chills or hemoptysis; No shortness of breath  CARDIOVASCULAR: No chest pain, palpitations, dizziness, or leg swelling  GASTROINTESTINAL: No abdominal or epigastric pain. No nausea, vomiting, or hematemesis; No diarrhea or constipation. No melena or hematochezia.  GENITOURINARY: No dysuria, frequency, hematuria, or incontinence  NEUROLOGICAL: No headaches, memory loss, loss of strength, numbness, or tremors  SKIN: No itching, burning, rashes, or lesions   LYMPH NODES: No enlarged glands  ENDOCRINE: No heat or cold intolerance; No hair loss  MUSCULOSKELETAL: No joint pain or swelling; No muscle, back, or extremity pain  PSYCHIATRIC: No depression, anxiety, mood swings, or difficulty sleeping  HEME/LYMPH: No easy bruising, or bleeding gums  ALLERGY AND IMMUNOLOGIC: No hives or eczema
Immediate family member

## 2020-01-29 ENCOUNTER — INPATIENT (INPATIENT)
Facility: HOSPITAL | Age: 80
LOS: 8 days | Discharge: NURSING, REHAB OR LONG-TERM CARE | End: 2020-02-07
Attending: STUDENT IN AN ORGANIZED HEALTH CARE EDUCATION/TRAINING PROGRAM | Admitting: STUDENT IN AN ORGANIZED HEALTH CARE EDUCATION/TRAINING PROGRAM
Payer: MEDICARE

## 2020-01-29 VITALS
HEART RATE: 85 BPM | OXYGEN SATURATION: 100 % | TEMPERATURE: 98 F | WEIGHT: 184.97 LBS | HEIGHT: 68 IN | DIASTOLIC BLOOD PRESSURE: 78 MMHG | RESPIRATION RATE: 19 BRPM | SYSTOLIC BLOOD PRESSURE: 120 MMHG

## 2020-01-29 LAB
ALBUMIN SERPL ELPH-MCNC: 3 G/DL — LOW (ref 3.3–5)
ALP SERPL-CCNC: 106 U/L — SIGNIFICANT CHANGE UP (ref 40–120)
ALT FLD-CCNC: 22 U/L — SIGNIFICANT CHANGE UP (ref 12–78)
ANION GAP SERPL CALC-SCNC: 7 MMOL/L — SIGNIFICANT CHANGE UP (ref 5–17)
APTT BLD: 31.6 SEC — SIGNIFICANT CHANGE UP (ref 27.5–36.3)
AST SERPL-CCNC: 47 U/L — HIGH (ref 15–37)
BASOPHILS # BLD AUTO: 0.03 K/UL — SIGNIFICANT CHANGE UP (ref 0–0.2)
BASOPHILS NFR BLD AUTO: 0.4 % — SIGNIFICANT CHANGE UP (ref 0–2)
BILIRUB SERPL-MCNC: 0.3 MG/DL — SIGNIFICANT CHANGE UP (ref 0.2–1.2)
BUN SERPL-MCNC: 31 MG/DL — HIGH (ref 7–23)
CALCIUM SERPL-MCNC: 9.7 MG/DL — SIGNIFICANT CHANGE UP (ref 8.5–10.1)
CHLORIDE SERPL-SCNC: 103 MMOL/L — SIGNIFICANT CHANGE UP (ref 96–108)
CO2 SERPL-SCNC: 29 MMOL/L — SIGNIFICANT CHANGE UP (ref 22–31)
CREAT SERPL-MCNC: 3.17 MG/DL — HIGH (ref 0.5–1.3)
EOSINOPHIL # BLD AUTO: 0.11 K/UL — SIGNIFICANT CHANGE UP (ref 0–0.5)
EOSINOPHIL NFR BLD AUTO: 1.4 % — SIGNIFICANT CHANGE UP (ref 0–6)
GLUCOSE SERPL-MCNC: 126 MG/DL — HIGH (ref 70–99)
HCT VFR BLD CALC: 32.2 % — LOW (ref 39–50)
HGB BLD-MCNC: 11.2 G/DL — LOW (ref 13–17)
IMM GRANULOCYTES NFR BLD AUTO: 0.2 % — SIGNIFICANT CHANGE UP (ref 0–1.5)
INR BLD: 1.25 RATIO — HIGH (ref 0.88–1.16)
LACTATE SERPL-SCNC: 1.8 MMOL/L — SIGNIFICANT CHANGE UP (ref 0.7–2)
LYMPHOCYTES # BLD AUTO: 1.66 K/UL — SIGNIFICANT CHANGE UP (ref 1–3.3)
LYMPHOCYTES # BLD AUTO: 20.7 % — SIGNIFICANT CHANGE UP (ref 13–44)
MCHC RBC-ENTMCNC: 30.2 PG — SIGNIFICANT CHANGE UP (ref 27–34)
MCHC RBC-ENTMCNC: 34.8 GM/DL — SIGNIFICANT CHANGE UP (ref 32–36)
MCV RBC AUTO: 86.8 FL — SIGNIFICANT CHANGE UP (ref 80–100)
MONOCYTES # BLD AUTO: 0.81 K/UL — SIGNIFICANT CHANGE UP (ref 0–0.9)
MONOCYTES NFR BLD AUTO: 10.1 % — SIGNIFICANT CHANGE UP (ref 2–14)
NEUTROPHILS # BLD AUTO: 5.38 K/UL — SIGNIFICANT CHANGE UP (ref 1.8–7.4)
NEUTROPHILS NFR BLD AUTO: 67.2 % — SIGNIFICANT CHANGE UP (ref 43–77)
NRBC # BLD: 0 /100 WBCS — SIGNIFICANT CHANGE UP (ref 0–0)
PLATELET # BLD AUTO: 274 K/UL — SIGNIFICANT CHANGE UP (ref 150–400)
POTASSIUM SERPL-MCNC: 4.4 MMOL/L — SIGNIFICANT CHANGE UP (ref 3.5–5.3)
POTASSIUM SERPL-SCNC: 4.4 MMOL/L — SIGNIFICANT CHANGE UP (ref 3.5–5.3)
PROT SERPL-MCNC: 8.8 GM/DL — HIGH (ref 6–8.3)
PROTHROM AB SERPL-ACNC: 14.1 SEC — HIGH (ref 10–12.9)
RBC # BLD: 3.71 M/UL — LOW (ref 4.2–5.8)
RBC # FLD: 13.5 % — SIGNIFICANT CHANGE UP (ref 10.3–14.5)
SODIUM SERPL-SCNC: 139 MMOL/L — SIGNIFICANT CHANGE UP (ref 135–145)
TROPONIN I SERPL-MCNC: <.015 NG/ML — SIGNIFICANT CHANGE UP (ref 0.01–0.04)
WBC # BLD: 8.01 K/UL — SIGNIFICANT CHANGE UP (ref 3.8–10.5)
WBC # FLD AUTO: 8.01 K/UL — SIGNIFICANT CHANGE UP (ref 3.8–10.5)

## 2020-01-29 PROCEDURE — 71045 X-RAY EXAM CHEST 1 VIEW: CPT | Mod: 26

## 2020-01-29 PROCEDURE — 99285 EMERGENCY DEPT VISIT HI MDM: CPT

## 2020-01-29 PROCEDURE — 93010 ELECTROCARDIOGRAM REPORT: CPT

## 2020-01-29 NOTE — ED ADULT TRIAGE NOTE - CHIEF COMPLAINT QUOTE
BIBA from home general malaise. Usually ambulating at home but noted weakness increasing since Thursday. . HX dementia

## 2020-01-29 NOTE — ED PROVIDER NOTE - CARE PLAN
Principal Discharge DX:	Cellulitis  Secondary Diagnosis:	Weakness  Secondary Diagnosis:	Acute on chronic renal insufficiency

## 2020-01-29 NOTE — ED PROVIDER NOTE - PHYSICAL EXAMINATION
Gen: Alert, NAD  Head: NC, AT, PERRL, EOMI, normal lids/conjunctiva  ENT: normal hearing, patent oropharynx, MMM  Neck: supple, no tenderness/meningismus, FROM, Trachea midline  Pulm: Bilateral clear BS, normal resp effort, no wheeze/stridor/retractions  CV: RRR, no M/R/G, +dist pulses  Abd: soft, NT/ND, +BS, no guarding/rebound tenderness  Mskel: +edema/erythema of RLE ankle and foot, no areas of fluctuance, no purulent drainage, sensation intact  Skin: thickened skin in LE, +erythema of RLE  Neuro: AAOx3, no sensory/motor deficits, CN 2-12 intact

## 2020-01-29 NOTE — ED ADULT NURSE NOTE - NSIMPLEMENTINTERV_GEN_ALL_ED
Implemented All Fall with Harm Risk Interventions:  [place] to call system. Call bell, personal items and telephone within reach. Instruct patient to call for assistance. Room bathroom lighting operational. Non-slip footwear when patient is off stretcher. Physically safe environment: no spills, clutter or unnecessary equipment. Stretcher in lowest position, wheels locked, appropriate side rails in place. Provide visual cue, wrist band, yellow gown, etc. Monitor gait and stability. Monitor for mental status changes and reorient to person, place, and time. Review medications for side effects contributing to fall risk. Reinforce activity limits and safety measures with patient and family. Provide visual clues: red socks.

## 2020-01-29 NOTE — ED ADULT NURSE NOTE - OBJECTIVE STATEMENT
pt seen at bedside with daughter who states she is his care taker at home, pt has periods of confusion and c/o weakness x 1 week with cold symptoms.

## 2020-01-29 NOTE — ED PROVIDER NOTE - OBJECTIVE STATEMENT
Pertinent PMH/PSH/FHx/SHx and Review of Systems contained within:      No fever/chills, No photophobia/eye pain/changes in vision, No ear pain/sore throat/dysphagia, No chest pain/palpitations, no SOB/cough/wheeze/stridor, No abdominal pain, No N/V/D, no dysuria/frequency/discharge, No neck/back pain, no rash, no changes in neurological status/function. Pertinent PMH/PSH/FHx/SHx and Review of Systems contained within:  78 y/o male w/PMH of mild dementia, HLD, HTN, DM, seizure disorder presents to the ED today for evaluation of weakness. Pt states 'spiders bit me'. Pt now has weakness. Family at bedsides stats pt has had cough, treated empirically w/Levaquin, pt took x1 dose and symptoms started. Pt denies fever/chills, CP, SOB, N/V/D or fall. Pt reports pain in right foot and ankle where he believes a spider bit him.     No fever/chills, No photophobia/eye pain/changes in vision, No ear pain/sore throat/dysphagia, No chest pain/palpitations, no SOB/cough/wheeze/stridor, No abdominal pain, No N/V/D, no dysuria/frequency/discharge, No neck/back pain, no rash, no changes in neurological status/function, +right foot/ankle pain, +weakness Pertinent PMH/PSH/FHx/SHx and Review of Systems contained within:    78 y/o male w/PMH of mild dementia, HLD, HTN, DM, seizure disorder presents to the ED today for evaluation of weakness.  Pt states 'spiders bit me' & now has weakness, is unable to ambulate. Family at bedsides stats pt has had cough few days prior, treated empirically w/Levaquin, pt took x1 dose and symptoms started.  Pt denies fever/chills, CP, SOB, N/V/D or fall. Pt reports pain in right foot and ankle where he believes a spider bit him.     No fever/chills, No photophobia/eye pain/changes in vision, No ear pain/sore throat/dysphagia, No chest pain/palpitations, no SOB/cough/wheeze/stridor, No abdominal pain, No neck/back pain, no rash, +right foot/ankle pain, +weakness

## 2020-01-30 DIAGNOSIS — L03.115 CELLULITIS OF RIGHT LOWER LIMB: ICD-10-CM

## 2020-01-30 DIAGNOSIS — N28.9 DISORDER OF KIDNEY AND URETER, UNSPECIFIED: ICD-10-CM

## 2020-01-30 DIAGNOSIS — K21.9 GASTRO-ESOPHAGEAL REFLUX DISEASE WITHOUT ESOPHAGITIS: ICD-10-CM

## 2020-01-30 DIAGNOSIS — I73.9 PERIPHERAL VASCULAR DISEASE, UNSPECIFIED: ICD-10-CM

## 2020-01-30 DIAGNOSIS — E11.21 TYPE 2 DIABETES MELLITUS WITH DIABETIC NEPHROPATHY: ICD-10-CM

## 2020-01-30 DIAGNOSIS — I10 ESSENTIAL (PRIMARY) HYPERTENSION: ICD-10-CM

## 2020-01-30 LAB
ANION GAP SERPL CALC-SCNC: 8 MMOL/L — SIGNIFICANT CHANGE UP (ref 5–17)
APPEARANCE UR: CLEAR — SIGNIFICANT CHANGE UP
BASOPHILS # BLD AUTO: 0.04 K/UL — SIGNIFICANT CHANGE UP (ref 0–0.2)
BASOPHILS NFR BLD AUTO: 0.6 % — SIGNIFICANT CHANGE UP (ref 0–2)
BILIRUB UR-MCNC: NEGATIVE — SIGNIFICANT CHANGE UP
BUN SERPL-MCNC: 29 MG/DL — HIGH (ref 7–23)
CALCIUM SERPL-MCNC: 9.2 MG/DL — SIGNIFICANT CHANGE UP (ref 8.5–10.1)
CHLORIDE SERPL-SCNC: 105 MMOL/L — SIGNIFICANT CHANGE UP (ref 96–108)
CO2 SERPL-SCNC: 28 MMOL/L — SIGNIFICANT CHANGE UP (ref 22–31)
COLOR SPEC: YELLOW — SIGNIFICANT CHANGE UP
CREAT SERPL-MCNC: 2.63 MG/DL — HIGH (ref 0.5–1.3)
CRP SERPL-MCNC: 2.81 MG/DL — HIGH (ref 0–0.4)
DIFF PNL FLD: NEGATIVE — SIGNIFICANT CHANGE UP
EOSINOPHIL # BLD AUTO: 0.17 K/UL — SIGNIFICANT CHANGE UP (ref 0–0.5)
EOSINOPHIL NFR BLD AUTO: 2.3 % — SIGNIFICANT CHANGE UP (ref 0–6)
FLU A RESULT: SIGNIFICANT CHANGE UP
FLU A RESULT: SIGNIFICANT CHANGE UP
FLUAV AG NPH QL: SIGNIFICANT CHANGE UP
FLUBV AG NPH QL: SIGNIFICANT CHANGE UP
GLUCOSE BLDC GLUCOMTR-MCNC: 101 MG/DL — HIGH (ref 70–99)
GLUCOSE BLDC GLUCOMTR-MCNC: 172 MG/DL — HIGH (ref 70–99)
GLUCOSE BLDC GLUCOMTR-MCNC: 64 MG/DL — LOW (ref 70–99)
GLUCOSE BLDC GLUCOMTR-MCNC: 65 MG/DL — LOW (ref 70–99)
GLUCOSE BLDC GLUCOMTR-MCNC: 87 MG/DL — SIGNIFICANT CHANGE UP (ref 70–99)
GLUCOSE BLDC GLUCOMTR-MCNC: 88 MG/DL — SIGNIFICANT CHANGE UP (ref 70–99)
GLUCOSE SERPL-MCNC: 103 MG/DL — HIGH (ref 70–99)
GLUCOSE UR QL: NEGATIVE MG/DL — SIGNIFICANT CHANGE UP
HCT VFR BLD CALC: 31.1 % — LOW (ref 39–50)
HGB BLD-MCNC: 10.9 G/DL — LOW (ref 13–17)
IMM GRANULOCYTES NFR BLD AUTO: 0.4 % — SIGNIFICANT CHANGE UP (ref 0–1.5)
KETONES UR-MCNC: NEGATIVE — SIGNIFICANT CHANGE UP
LEUKOCYTE ESTERASE UR-ACNC: NEGATIVE — SIGNIFICANT CHANGE UP
LYMPHOCYTES # BLD AUTO: 1.72 K/UL — SIGNIFICANT CHANGE UP (ref 1–3.3)
LYMPHOCYTES # BLD AUTO: 23.7 % — SIGNIFICANT CHANGE UP (ref 13–44)
MCHC RBC-ENTMCNC: 29.6 PG — SIGNIFICANT CHANGE UP (ref 27–34)
MCHC RBC-ENTMCNC: 35 GM/DL — SIGNIFICANT CHANGE UP (ref 32–36)
MCV RBC AUTO: 84.5 FL — SIGNIFICANT CHANGE UP (ref 80–100)
MONOCYTES # BLD AUTO: 0.7 K/UL — SIGNIFICANT CHANGE UP (ref 0–0.9)
MONOCYTES NFR BLD AUTO: 9.6 % — SIGNIFICANT CHANGE UP (ref 2–14)
NEUTROPHILS # BLD AUTO: 4.6 K/UL — SIGNIFICANT CHANGE UP (ref 1.8–7.4)
NEUTROPHILS NFR BLD AUTO: 63.4 % — SIGNIFICANT CHANGE UP (ref 43–77)
NITRITE UR-MCNC: NEGATIVE — SIGNIFICANT CHANGE UP
NRBC # BLD: 0 /100 WBCS — SIGNIFICANT CHANGE UP (ref 0–0)
PH UR: 6 — SIGNIFICANT CHANGE UP (ref 5–8)
PLATELET # BLD AUTO: 268 K/UL — SIGNIFICANT CHANGE UP (ref 150–400)
POTASSIUM SERPL-MCNC: 3.6 MMOL/L — SIGNIFICANT CHANGE UP (ref 3.5–5.3)
POTASSIUM SERPL-SCNC: 3.6 MMOL/L — SIGNIFICANT CHANGE UP (ref 3.5–5.3)
PROT UR-MCNC: 15 MG/DL
RBC # BLD: 3.68 M/UL — LOW (ref 4.2–5.8)
RBC # FLD: 13.4 % — SIGNIFICANT CHANGE UP (ref 10.3–14.5)
RBC CASTS # UR COMP ASSIST: NEGATIVE /HPF — SIGNIFICANT CHANGE UP (ref 0–4)
RSV RESULT: SIGNIFICANT CHANGE UP
RSV RNA RESP QL NAA+PROBE: SIGNIFICANT CHANGE UP
SODIUM SERPL-SCNC: 141 MMOL/L — SIGNIFICANT CHANGE UP (ref 135–145)
SP GR SPEC: 1.01 — SIGNIFICANT CHANGE UP (ref 1.01–1.02)
UROBILINOGEN FLD QL: NEGATIVE MG/DL — SIGNIFICANT CHANGE UP
WBC # BLD: 7.26 K/UL — SIGNIFICANT CHANGE UP (ref 3.8–10.5)
WBC # FLD AUTO: 7.26 K/UL — SIGNIFICANT CHANGE UP (ref 3.8–10.5)
WBC UR QL: SIGNIFICANT CHANGE UP

## 2020-01-30 PROCEDURE — 73630 X-RAY EXAM OF FOOT: CPT | Mod: 26,RT

## 2020-01-30 PROCEDURE — 99223 1ST HOSP IP/OBS HIGH 75: CPT

## 2020-01-30 PROCEDURE — 93971 EXTREMITY STUDY: CPT | Mod: 26,RT

## 2020-01-30 PROCEDURE — 99231 SBSQ HOSP IP/OBS SF/LOW 25: CPT

## 2020-01-30 RX ORDER — VANCOMYCIN HCL 1 G
1000 VIAL (EA) INTRAVENOUS ONCE
Refills: 0 | Status: COMPLETED | OUTPATIENT
Start: 2020-01-30 | End: 2020-01-30

## 2020-01-30 RX ORDER — DEXTROSE 50 % IN WATER 50 %
12.5 SYRINGE (ML) INTRAVENOUS ONCE
Refills: 0 | Status: DISCONTINUED | OUTPATIENT
Start: 2020-01-30 | End: 2020-02-07

## 2020-01-30 RX ORDER — QUETIAPINE FUMARATE 200 MG/1
25 TABLET, FILM COATED ORAL ONCE
Refills: 0 | Status: COMPLETED | OUTPATIENT
Start: 2020-01-30 | End: 2020-01-30

## 2020-01-30 RX ORDER — LOSARTAN POTASSIUM 100 MG/1
25 TABLET, FILM COATED ORAL DAILY
Refills: 0 | Status: DISCONTINUED | OUTPATIENT
Start: 2020-01-30 | End: 2020-01-31

## 2020-01-30 RX ORDER — HEPARIN SODIUM 5000 [USP'U]/ML
5000 INJECTION INTRAVENOUS; SUBCUTANEOUS EVERY 12 HOURS
Refills: 0 | Status: DISCONTINUED | OUTPATIENT
Start: 2020-01-30 | End: 2020-02-07

## 2020-01-30 RX ORDER — LEVETIRACETAM 250 MG/1
500 TABLET, FILM COATED ORAL
Refills: 0 | Status: DISCONTINUED | OUTPATIENT
Start: 2020-01-30 | End: 2020-02-07

## 2020-01-30 RX ORDER — OMEPRAZOLE 10 MG/1
1 CAPSULE, DELAYED RELEASE ORAL
Qty: 0 | Refills: 0 | DISCHARGE

## 2020-01-30 RX ORDER — AZTREONAM 2 G
VIAL (EA) INJECTION
Refills: 0 | Status: DISCONTINUED | OUTPATIENT
Start: 2020-01-30 | End: 2020-01-30

## 2020-01-30 RX ORDER — CILOSTAZOL 100 MG/1
1 TABLET ORAL
Qty: 0 | Refills: 0 | DISCHARGE

## 2020-01-30 RX ORDER — TAMSULOSIN HYDROCHLORIDE 0.4 MG/1
0.4 CAPSULE ORAL AT BEDTIME
Refills: 0 | Status: DISCONTINUED | OUTPATIENT
Start: 2020-01-30 | End: 2020-02-07

## 2020-01-30 RX ORDER — ASPIRIN/CALCIUM CARB/MAGNESIUM 324 MG
81 TABLET ORAL DAILY
Refills: 0 | Status: DISCONTINUED | OUTPATIENT
Start: 2020-01-30 | End: 2020-02-07

## 2020-01-30 RX ORDER — AZTREONAM 2 G
500 VIAL (EA) INJECTION ONCE
Refills: 0 | Status: COMPLETED | OUTPATIENT
Start: 2020-01-30 | End: 2020-01-30

## 2020-01-30 RX ORDER — AZTREONAM 2 G
500 VIAL (EA) INJECTION ONCE
Refills: 0 | Status: DISCONTINUED | OUTPATIENT
Start: 2020-01-30 | End: 2020-01-30

## 2020-01-30 RX ORDER — PANTOPRAZOLE SODIUM 20 MG/1
40 TABLET, DELAYED RELEASE ORAL
Refills: 0 | Status: DISCONTINUED | OUTPATIENT
Start: 2020-01-30 | End: 2020-02-07

## 2020-01-30 RX ORDER — DEXTROSE 50 % IN WATER 50 %
25 SYRINGE (ML) INTRAVENOUS ONCE
Refills: 0 | Status: DISCONTINUED | OUTPATIENT
Start: 2020-01-30 | End: 2020-02-07

## 2020-01-30 RX ORDER — SODIUM CHLORIDE 9 MG/ML
1000 INJECTION INTRAMUSCULAR; INTRAVENOUS; SUBCUTANEOUS
Refills: 0 | Status: DISCONTINUED | OUTPATIENT
Start: 2020-01-30 | End: 2020-02-03

## 2020-01-30 RX ORDER — IPRATROPIUM/ALBUTEROL SULFATE 18-103MCG
3 AEROSOL WITH ADAPTER (GRAM) INHALATION EVERY 6 HOURS
Refills: 0 | Status: DISCONTINUED | OUTPATIENT
Start: 2020-01-30 | End: 2020-02-01

## 2020-01-30 RX ORDER — MINERAL OIL
1 OIL (ML) MISCELLANEOUS THREE TIMES A DAY
Refills: 0 | Status: DISCONTINUED | OUTPATIENT
Start: 2020-01-30 | End: 2020-02-07

## 2020-01-30 RX ORDER — DEXTROSE 50 % IN WATER 50 %
15 SYRINGE (ML) INTRAVENOUS ONCE
Refills: 0 | Status: DISCONTINUED | OUTPATIENT
Start: 2020-01-30 | End: 2020-02-07

## 2020-01-30 RX ORDER — LOSARTAN POTASSIUM 100 MG/1
1 TABLET, FILM COATED ORAL
Qty: 0 | Refills: 0 | DISCHARGE

## 2020-01-30 RX ORDER — AZTREONAM 2 G
500 VIAL (EA) INJECTION EVERY 12 HOURS
Refills: 0 | Status: DISCONTINUED | OUTPATIENT
Start: 2020-01-30 | End: 2020-01-30

## 2020-01-30 RX ORDER — INSULIN LISPRO 100/ML
VIAL (ML) SUBCUTANEOUS
Refills: 0 | Status: DISCONTINUED | OUTPATIENT
Start: 2020-01-30 | End: 2020-02-07

## 2020-01-30 RX ORDER — SODIUM CHLORIDE 9 MG/ML
1000 INJECTION, SOLUTION INTRAVENOUS
Refills: 0 | Status: DISCONTINUED | OUTPATIENT
Start: 2020-01-30 | End: 2020-02-07

## 2020-01-30 RX ORDER — ASPIRIN/CALCIUM CARB/MAGNESIUM 324 MG
1 TABLET ORAL
Qty: 0 | Refills: 0 | DISCHARGE

## 2020-01-30 RX ORDER — GLUCAGON INJECTION, SOLUTION 0.5 MG/.1ML
1 INJECTION, SOLUTION SUBCUTANEOUS ONCE
Refills: 0 | Status: DISCONTINUED | OUTPATIENT
Start: 2020-01-30 | End: 2020-02-07

## 2020-01-30 RX ORDER — CILOSTAZOL 100 MG/1
100 TABLET ORAL
Refills: 0 | Status: DISCONTINUED | OUTPATIENT
Start: 2020-01-30 | End: 2020-02-07

## 2020-01-30 RX ADMIN — QUETIAPINE FUMARATE 25 MILLIGRAM(S): 200 TABLET, FILM COATED ORAL at 23:23

## 2020-01-30 RX ADMIN — CILOSTAZOL 100 MILLIGRAM(S): 100 TABLET ORAL at 18:18

## 2020-01-30 RX ADMIN — HEPARIN SODIUM 5000 UNIT(S): 5000 INJECTION INTRAVENOUS; SUBCUTANEOUS at 18:19

## 2020-01-30 RX ADMIN — Medication 250 MILLIGRAM(S): at 02:58

## 2020-01-30 RX ADMIN — Medication 1 APPLICATION(S): at 21:49

## 2020-01-30 RX ADMIN — LOSARTAN POTASSIUM 25 MILLIGRAM(S): 100 TABLET, FILM COATED ORAL at 09:11

## 2020-01-30 RX ADMIN — Medication 3 MILLILITER(S): at 17:55

## 2020-01-30 RX ADMIN — SODIUM CHLORIDE 80 MILLILITER(S): 9 INJECTION INTRAMUSCULAR; INTRAVENOUS; SUBCUTANEOUS at 12:02

## 2020-01-30 RX ADMIN — Medication 50 MILLIGRAM(S): at 05:51

## 2020-01-30 RX ADMIN — PANTOPRAZOLE SODIUM 40 MILLIGRAM(S): 20 TABLET, DELAYED RELEASE ORAL at 09:10

## 2020-01-30 RX ADMIN — TAMSULOSIN HYDROCHLORIDE 0.4 MILLIGRAM(S): 0.4 CAPSULE ORAL at 21:45

## 2020-01-30 RX ADMIN — LEVETIRACETAM 500 MILLIGRAM(S): 250 TABLET, FILM COATED ORAL at 18:18

## 2020-01-30 RX ADMIN — Medication 81 MILLIGRAM(S): at 11:58

## 2020-01-30 NOTE — H&P ADULT - PROBLEM SELECTOR PROBLEM 5
A user error has taken place: encounter opened in error, closed for administrative reasons. Type 2 diabetes mellitus with diabetic nephropathy, with long-term current use of insulin

## 2020-01-30 NOTE — PHYSICAL THERAPY INITIAL EVALUATION ADULT - BALANCE TRAINING, PT EVAL
Pt will improve static & dynamic standing balance to Good-  using [Rolling walker]   to perform ADL, Gait  with CGA

## 2020-01-30 NOTE — PHYSICAL THERAPY INITIAL EVALUATION ADULT - IMPAIRMENTS CONTRIBUTING IMPAIRED BED MOBILITY, REHAB EVAL
decreased strength/impaired postural control/decreased flexibility/impaired coordination/impaired balance

## 2020-01-30 NOTE — H&P ADULT - ASSESSMENT
80 y/o male w/PMH of mild dementia, HLD, HTN, DM2, seizure disorder presents to the ED today for evaluation of weakness. Pt states 'spiders bit me'. Pt now has weakness. Family at bedside states pt has had cough, treated empirically w/Levaquin, by PMD, pt took x1 dose and symptoms started. Pt denies fever/chills, CP, SOB, N/V/D or fall. Pt reports pain in right foot and ankle where he believes a spider bit him. Per family pt has dementia and occasionally hallucinates. Family  denies recent injury. Pt has probable cellulitis of foot, has multiple problems including CKD and PVD.  IMPROVE VTE Individual Risk Assessment          RISK                                                          Points    [  ] Previous VTE                                                3    [  ] Thrombophilia                                             2    [  ] Lower limb paralysis                                    2        (unable to hold up >15 seconds)      [  ] Current Cancer                                             2         (within 6 months)    [  ] Immobilization > 24 hrs                              1    [  ] ICU/CCU stay > 24 hours                            1    [ x ] Age > 60                                                    1    IMPROVE VTE Score ____1_____

## 2020-01-30 NOTE — H&P ADULT - NSHPPHYSICALEXAM_GEN_ALL_CORE
T(C): 36.6 (30 Jan 2020 02:59), Max: 36.8 (29 Jan 2020 20:31)  T(F): 97.8 (30 Jan 2020 02:59), Max: 98.3 (29 Jan 2020 20:31)  HR: 80 (30 Jan 2020 02:59) (73 - 85)  BP: 139/91 (30 Jan 2020 02:59) (120/78 - 146/90)  BP(mean): --  RR: 16 (30 Jan 2020 02:59) (16 - 19)  SpO2: 97% (30 Jan 2020 02:59) (95% - 100%)    PHYSICAL EXAM:  GENERAL: NAD, well-groomed, well-developed  HEAD:  Atraumatic, Normocephalic  EYES: EOMI, PERRLA, conjunctiva and sclera clear  ENMT: No tonsillar erythema, exudates, or enlargement; Moist mucous membranes, No lesions  NECK: Supple, No JVD, Normal thyroid  NERVOUS SYSTEM:  Alert & Oriented X3, CN 2-12 intact, no focal deficits  CHEST/LUNG: Clear to percussion bilaterally; No rales, rhonchi, wheezing, or rubs  HEART: Regular rate and rhythm; No murmurs, rubs, or gallops  ABDOMEN: Soft, Nontender, Nondistended; Bowel sounds present  EXTREMITIES:  decreased Peripheral Pulses, No clubbing, cyanosis, or edema  LYMPH: No lymphadenopathy noted  SKIN: redness, swelling and pain right foot

## 2020-01-30 NOTE — H&P ADULT - NSICDXPASTMEDICALHX_GEN_ALL_CORE_FT
PAST MEDICAL HISTORY:  BPH (benign prostatic hypertrophy)     Diabetes mellitus Type 2 NIDDM    GERD (gastroesophageal reflux disease)     Hyperlipidemia     Hypertension     Obesity     PVD (peripheral vascular disease)     Ulcer of foot

## 2020-01-30 NOTE — CONSULT NOTE ADULT - ASSESSMENT
mild redness of right leg   no cellulitis however  will dc systemic antibiotics   skin very dry and peeling  will add mineral oil a[pplication for both legs 78 y/o male w/PMH of mild dementia, HLD, HTN, DM2, seizure disorder presents to the ED today for evaluation of weakness. Pt states 'spiders bit me'. Pt now has weakness. Family at bedside states pt has had cough, treated empirically w/Levaquin, by PMD, pt took x1 dose and symptoms started. Pt denies fever/chills, CP, SOB, N/V/D or fall. Pt reports pain in right foot and ankle where he believes a spider bit him. Per family pt has dementia and occasionally hallucinates. Seen with   1.mild redness of right leg   no cellulitis however  will dc systemic antibiotics   skin very dry and peeling  will add mineral oil application for both legs  call if leg swelling or redness worsens

## 2020-01-30 NOTE — PHYSICAL THERAPY INITIAL EVALUATION ADULT - ADDITIONAL COMMENTS
As per pt, he lives with his daughter in a house with 10 steps to enter with bilateral raila reachable, once inside there are 18 steps to go to 2nd floor with bilateral rails reachable, pt uses RW as primary mode of ambulation , he was independent in all functional mobility using a RW, but needs limited assit for ADLs from his daughter. Unable to reach daughter Ms. Dallas Lamas

## 2020-01-30 NOTE — PHYSICAL THERAPY INITIAL EVALUATION ADULT - TRANSFER TRAINING, PT EVAL
Pt will be able to perform sit to stand, stand pivot transfer using [RW]    with CGA in 2 to 3 weeks

## 2020-01-30 NOTE — PHYSICAL THERAPY INITIAL EVALUATION ADULT - STRENGTHENING, PT EVAL
Pt will improve muscle strength in all extremities to WFL in 1 to 2 weeks to perform Gait & ADL  with CGA

## 2020-01-30 NOTE — H&P ADULT - NSHPLABSRESULTS_GEN_ALL_CORE
LABS:                        11.2   8.01  )-----------( 274      ( 2020 22:21 )             32.2         139  |  103  |  31<H>  ----------------------------<  126<H>  4.4   |  29  |  3.17<H>    Ca    9.7      2020 22:21    TPro  8.8<H>  /  Alb  3.0<L>  /  TBili  0.3  /  DBili  x   /  AST  47<H>  /  ALT  22  /  AlkPhos  106      PT/INR - ( 2020 22:21 )   PT: 14.1 sec;   INR: 1.25 ratio         PTT - ( 2020 22:21 )  PTT:31.6 sec  Urinalysis Basic - ( 2020 03:15 )    Color: Yellow / Appearance: Clear / S.010 / pH: x  Gluc: x / Ketone: Negative  / Bili: Negative / Urobili: Negative mg/dL   Blood: x / Protein: 15 mg/dL / Nitrite: Negative   Leuk Esterase: Negative / RBC: Negative /HPF / WBC 3-5   Sq Epi: x / Non Sq Epi: x / Bacteria: x      CAPILLARY BLOOD GLUCOSE  Lactate, Blood: 1.8 mmol/L (20 @ 22:21)  Troponin I, Serum: <.015: ng/mL (20 @ 22:21)                RADIOLOGY & ADDITIONAL TESTS:  < from: US Duplex Venous Lower Ext Ltd, Right (20 @ 00:42) >      IMPRESSION:     No evidence of right lower extremity deep venous thrombosis.    < end of copied text >      Imaging Personally Reviewed:  [x ] YES  [ ] NO

## 2020-01-30 NOTE — CONSULT NOTE ADULT - SUBJECTIVE AND OBJECTIVE BOX
Infectious Diseases - Attending at Dr. Hodgson    HPI:  80 y/o male w/PMH of mild dementia, HLD, HTN, DM2, seizure disorder presents to the ED today for evaluation of weakness. Pt states 'spiders bit me'. Pt now has weakness. Family at bedside states pt has had cough, treated empirically w/Levaquin, by PMD, pt took x1 dose and symptoms started. Pt denies fever/chills, CP, SOB, N/V/D or fall. Pt reports pain in right foot and ankle where he believes a spider bit him. Per family pt has dementia and occasionally hallucinates. Family  denies recent injury. 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: laser surgery right LE      Allergies    Cipro (Hives)  codeine (Other)  penicillins (Unknown)    Intolerances        FAMILY HISTORY:  Family history of schizophrenia (Mother)      SOCIAL HISTORY:    REVIEW OF SYSTEMS:    Constitutional: No fever, weight loss or fatigue  Eyes: No eye pain, visual disturbances, or discharge  ENT:  No difficulty hearing, tinnitus, vertigo; No sinus or throat pain  Neck: No pain or stiffness  Respiratory: No cough, wheezing, chills or hemoptysis  Cardiovascular: No chest pain, palpitations, shortness of breath, dizziness or leg swelling  Gastrointestinal: No abdominal or epigastric pain. No nausea, vomiting or hematemesis; No diarrhea or constipation. No melena or hematochezia.  Genitourinary: No dysuria, frequency, hematuria or incontinence  Neurological: No headaches, memory loss, loss of strength, numbness or tremors  Skin: No itching, burning, rashes or lesions       MEDICATIONS  (STANDING):  albuterol/ipratropium for Nebulization 3 milliLiter(s) Nebulizer every 6 hours  aspirin  chewable 81 milliGRAM(s) Oral daily  cilostazol 100 milliGRAM(s) Oral two times a day  dextrose 5%. 1000 milliLiter(s) (50 mL/Hr) IV Continuous <Continuous>  dextrose 50% Injectable 12.5 Gram(s) IV Push once  dextrose 50% Injectable 25 Gram(s) IV Push once  dextrose 50% Injectable 25 Gram(s) IV Push once  heparin  Injectable 5000 Unit(s) SubCutaneous every 12 hours  insulin lispro (HumaLOG) corrective regimen sliding scale   SubCutaneous three times a day before meals  levETIRAcetam 500 milliGRAM(s) Oral two times a day  losartan 25 milliGRAM(s) Oral daily  pantoprazole    Tablet 40 milliGRAM(s) Oral before breakfast  sodium chloride 0.9%. 1000 milliLiter(s) (80 mL/Hr) IV Continuous <Continuous>  tamsulosin 0.4 milliGRAM(s) Oral at bedtime    MEDICATIONS  (PRN):  dextrose 40% Gel 15 Gram(s) Oral once PRN Blood Glucose LESS THAN 70 milliGRAM(s)/deciliter  glucagon  Injectable 1 milliGRAM(s) IntraMuscular once PRN Glucose LESS THAN 70 milligrams/deciliter      Vital Signs Last 24 Hrs  T(C): 36.7 (2020 11:45), Max: 37.1 (2020 07:58)  T(F): 98.1 (2020 11:45), Max: 98.8 (2020 07:58)  HR: 79 (2020 11:45) (73 - 85)  BP: 129/85 (2020 11:45) (120/78 - 155/95)  BP(mean): --  RR: 16 (2020 11:45) (16 - 21)  SpO2: 98% (2020 11:45) (95% - 100%)    PHYSICAL EXAM:    Constitutional: NAD, well-groomed, well-developed  HEENT: PERRLA, EOMI, Normal Hearing, MMM  Neck: No LAD, No JVD  Back: Normal spine flexure, No CVA tenderness  Respiratory: CTAB/L  Cardiovascular: S1 and S2, RRR, no M/G/R  Gastrointestinal: BS+, soft, NT/ND  Extremities: No peripheral edema  Vascular: 2+ peripheral pulses  Neurological: A/O x 3, no focal deficits  Skin: No rashes      LABS:                        10.9   7.26  )-----------( 268      ( 2020 08:05 )             31.1     01-30    141  |  105  |  29<H>  ----------------------------<  103<H>  3.6   |  28  |  2.63<H>    Ca    9.2      2020 08:05    TPro  8.8<H>  /  Alb  3.0<L>  /  TBili  0.3  /  DBili  x   /  AST  47<H>  /  ALT  22  /  AlkPhos  106      PT/INR - ( 2020 22:21 )   PT: 14.1 sec;   INR: 1.25 ratio         PTT - ( 2020 22:21 )  PTT:31.6 sec  Urinalysis Basic - ( 2020 03:15 )    Color: Yellow / Appearance: Clear / S.010 / pH: x  Gluc: x / Ketone: Negative  / Bili: Negative / Urobili: Negative mg/dL   Blood: x / Protein: 15 mg/dL / Nitrite: Negative   Leuk Esterase: Negative / RBC: Negative /HPF / WBC 3-5   Sq Epi: x / Non Sq Epi: x / Bacteria: x        MICROBIOLOGY:  RECENT CULTURES:        RADIOLOGY & ADDITIONAL STUDIES: Infectious Diseases - Attending at Dr. Hodgson    HPI:  80 y/o male w/PMH of mild dementia, HLD, HTN, DM2, seizure disorder presents to the ED today for evaluation of weakness. Pt states 'spiders bit me'. Pt now has weakness. Family at bedside states pt has had cough, treated empirically w/Levaquin, by PMD, pt took x1 dose and symptoms started. Pt denies fever/chills, CP, SOB, N/V/D or fall. Pt reports pain in right foot and ankle where he believes a spider bit him. Per family pt has dementia and occasionally hallucinates. Family  denies recent injury. (2020 06:00)  pt poor historian says he cant remember as he is demented ,denies any leg pain now.      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: laser surgery right LE      Allergies    Cipro (Hives)  codeine (Other)  penicillins (Unknown)    Intolerances        FAMILY HISTORY:  Family history of schizophrenia (Mother)      SOCIAL HISTORY: non smoker    REVIEW OF SYSTEMS:    Constitutional: No fever, weight loss or fatigue  Eyes: No eye pain, visual disturbances, or discharge  ENT:  No difficulty hearing, tinnitus, vertigo; No sinus or throat pain  Neck: No pain or stiffness  Respiratory: No cough, wheezing, chills or hemoptysis  Cardiovascular: No chest pain, palpitations, shortness of breath, dizziness or leg swelling  Gastrointestinal: No abdominal or epigastric pain. No nausea, vomiting or hematemesis; No diarrhea or constipation. No melena or hematochezia.  Genitourinary: No dysuria, frequency, hematuria or incontinence  Neurological: No headaches, memory loss, loss of strength, numbness or tremors  Skin: very dry skin      MEDICATIONS  (STANDING):  albuterol/ipratropium for Nebulization 3 milliLiter(s) Nebulizer every 6 hours  aspirin  chewable 81 milliGRAM(s) Oral daily  cilostazol 100 milliGRAM(s) Oral two times a day  dextrose 5%. 1000 milliLiter(s) (50 mL/Hr) IV Continuous <Continuous>  dextrose 50% Injectable 12.5 Gram(s) IV Push once  dextrose 50% Injectable 25 Gram(s) IV Push once  dextrose 50% Injectable 25 Gram(s) IV Push once  heparin  Injectable 5000 Unit(s) SubCutaneous every 12 hours  insulin lispro (HumaLOG) corrective regimen sliding scale   SubCutaneous three times a day before meals  levETIRAcetam 500 milliGRAM(s) Oral two times a day  losartan 25 milliGRAM(s) Oral daily  pantoprazole    Tablet 40 milliGRAM(s) Oral before breakfast  sodium chloride 0.9%. 1000 milliLiter(s) (80 mL/Hr) IV Continuous <Continuous>  tamsulosin 0.4 milliGRAM(s) Oral at bedtime    MEDICATIONS  (PRN):  dextrose 40% Gel 15 Gram(s) Oral once PRN Blood Glucose LESS THAN 70 milliGRAM(s)/deciliter  glucagon  Injectable 1 milliGRAM(s) IntraMuscular once PRN Glucose LESS THAN 70 milligrams/deciliter      Vital Signs Last 24 Hrs  T(C): 36.7 (2020 11:45), Max: 37.1 (2020 07:58)  T(F): 98.1 (2020 11:45), Max: 98.8 (2020 07:58)  HR: 79 (2020 11:45) (73 - 85)  BP: 129/85 (2020 11:45) (120/78 - 155/95)  BP(mean): --  RR: 16 (2020 11:45) (16 - 21)  SpO2: 98% (2020 11:45) (95% - 100%)    PHYSICAL EXAM:    Constitutional: NAD, well-groomed, well-developed  HEENT: PERRLA, EOMI, Normal Hearing, MMM  Neck: No LAD, No JVD  Back: Normal spine flexure, No CVA tenderness  Respiratory: CTAB/L  Cardiovascular: S1 and S2, RRR, no M/G/R  Gastrointestinal: BS+, soft, NT/ND  Extremities: No peripheral edema  Vascular: 2+ peripheral pulses  Neurological: A/O x 3, no focal deficits  Skin: very dry skin ,peeling off in crusts, mild redness of right leg ,midl warmth ,no insect bite noted on foot or leg      LABS:                        10.9   7.26  )-----------( 268      ( 2020 08:05 )             31.1     01-30    141  |  105  |  29<H>  ----------------------------<  103<H>  3.6   |  28  |  2.63<H>    Ca    9.2      2020 08:05    TPro  8.8<H>  /  Alb  3.0<L>  /  TBili  0.3  /  DBili  x   /  AST  47<H>  /  ALT  22  /  AlkPhos  106      PT/INR - ( 2020 22:21 )   PT: 14.1 sec;   INR: 1.25 ratio         PTT - ( 2020 22:21 )  PTT:31.6 sec  Urinalysis Basic - ( 2020 03:15 )    Color: Yellow / Appearance: Clear / S.010 / pH: x  Gluc: x / Ketone: Negative  / Bili: Negative / Urobili: Negative mg/dL   Blood: x / Protein: 15 mg/dL / Nitrite: Negative   Leuk Esterase: Negative / RBC: Negative /HPF / WBC 3-5   Sq Epi: x / Non Sq Epi: x / Bacteria: x        MICROBIOLOGY:  RECENT CULTURES:        RADIOLOGY & ADDITIONAL STUDIES:

## 2020-01-30 NOTE — PHYSICAL THERAPY INITIAL EVALUATION ADULT - IMPAIRMENTS CONTRIBUTING TO GAIT DEVIATIONS, PT EVAL
impaired postural control/decreased strength/impaired coordination/decreased flexibility/impaired balance

## 2020-01-30 NOTE — PHYSICAL THERAPY INITIAL EVALUATION ADULT - TRANSFER SAFETY CONCERNS NOTED: SIT/STAND, REHAB EVAL
losing balance/stepping too close to front of assistive device/decreased sequencing ability/decreased safety awareness/decreased balance during turns

## 2020-01-30 NOTE — CONSULT NOTE ADULT - SUBJECTIVE AND OBJECTIVE BOX
Patient is a 79y old  Male who presents with a chief complaint of Weakness and redness, pain left foot. (2020 06:00)      HPI:  78 y/o male w/PMH of mild dementia, HLD, HTN, DM2, Seizure disorder .  Presented to the ED today for evaluation of weakness. Pt states 'spiders bit me'. Pt now has weakness. Family in ED  stated  pt has had cough, treated empirically w/Levaquin, by PMD, pt took x1 dose and symptoms started. Denies fever/chills, CP, SOB, N/V/D or fall. Pt reports pain in right foot and ankle where he believes a spider bit him. Per family pt has dementia and occasionally hallucinates. Family  denies recent injury.   CXR with right base atelectasis/infiltrate. Denies smoking.    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 no smoking history    Allergies  Cipro (Hives)  codeine (Other)  penicillins (Unknown)      MEDICATIONS  (STANDING):  aspirin  chewable 81 milliGRAM(s) Oral daily  aztreonam  IVPB 500 milliGRAM(s) IV Intermittent every 12 hours  cilostazol 100 milliGRAM(s) Oral two times a day  dextrose 5%. 1000 milliLiter(s) (50 mL/Hr) IV Continuous <Continuous>  dextrose 50% Injectable 12.5 Gram(s) IV Push once  dextrose 50% Injectable 25 Gram(s) IV Push once  dextrose 50% Injectable 25 Gram(s) IV Push once  heparin  Injectable 5000 Unit(s) SubCutaneous every 12 hours  insulin lispro (HumaLOG) corrective regimen sliding scale   SubCutaneous three times a day before meals  levETIRAcetam 500 milliGRAM(s) Oral two times a day  losartan 25 milliGRAM(s) Oral daily  pantoprazole    Tablet 40 milliGRAM(s) Oral before breakfast  sodium chloride 0.9%. 1000 milliLiter(s) (80 mL/Hr) IV Continuous <Continuous>  tamsulosin 0.4 milliGRAM(s) Oral at bedtime    MEDICATIONS  (PRN):  dextrose 40% Gel 15 Gram(s) Oral once PRN Blood Glucose LESS THAN 70 milliGRAM(s)/deciliter  glucagon  Injectable 1 milliGRAM(s) IntraMuscular once PRN Glucose LESS THAN 70 milligrams/deciliter    REVIEW OF SYSTEMS:  can not provide details.    MACRA & MIPS:  Vaccines - Influenza: yes and Pneumovax: yes  Tobacco: no  Blood Presssure Screening / Control of: 155/95  Current Medications Reviewed: yes    Vital Signs Last 24 Hrs  T(C): 36.7 (2020 11:45), Max: 37.1 (2020 07:58)  T(F): 98.1 (2020 11:45), Max: 98.8 (2020 07:58)  HR: 79 (2020 11:45) (73 - 85)  BP: 129/85 (2020 11:45) (120/78 - 155/95)  BP(mean): --  RR: 16 (2020 11:45) (16 - 21)  SpO2: 98% (2020 11:45) (95% - 100%)    PHYSICAL EXAM:  GEN:         Awake, responsive and comfortable.  HEENT:    Normal.    RESP:        decreased air entry  CVS:             Regular rate and rhythm.   ABD:         Soft, non-tender, non-distended;   :             No costovertebral angle tenderness  SKIN:           Warm and dry.  EXTR:            No clubbing, cyanosis or edema  CNS:              Intact sensory and motor function.  PSYCH:        cooperative, no anxiety or depression    LABS:                        10.9   7.26  )-----------( 268      ( 2020 08:05 )             31.1         141  |  105  |  29<H>  ----------------------------<  103<H>  3.6   |  28  |  2.63<H>    Ca    9.2      2020 08:05    TPro  8.8<H>  /  Alb  3.0<L>  /  TBili  0.3  /  DBili  x   /  AST  47<H>  /  ALT  22  /  AlkPhos  106  -    PT/INR - ( 2020 22:21 )   PT: 14.1 sec;   INR: 1.25 ratio       PTT - ( 2020 22:21 )  PTT:31.6 sec    Urinalysis Basic - ( 2020 03:15 )    Color: Yellow / Appearance: Clear / S.010 / pH: x  Gluc: x / Ketone: Negative  / Bili: Negative / Urobili: Negative mg/dL   Blood: x / Protein: 15 mg/dL / Nitrite: Negative   Leuk Esterase: Negative / RBC: Negative /HPF / WBC 3-5   Sq Epi: x / Non Sq Epi: x / Bacteria: x    EKG: sinus rhythm    RADIOLOGY & ADDITIONAL STUDIES:  < from: Xray Foot AP + Lateral + Oblique, Right (20 @ 00:56) >  EXAM:  FOOT COMPLETE  3 VWS  RT                          PROCEDURE DATE:  2020      INTERPRETATION:  Right foot. Patient has local swelling and erythema. 3 views.    There is a hallux valgus with degeneration.    No bone destruction or fracture is evident.    Arterial calcifications are noted.    The above findings are similar to 2019.    Present film shows mild dorsal swelling new since prior.    IMPRESSION: Mild dorsal soft tissue swelling. Otherwise stable findings as above including arterial calcification.    KRYSTAL CHAVIS M.D., ATTENDING RADIOLOGIST  This document has been electronically signed. 2020  7:52AM    < from: Xray Chest 1 View- PORTABLE-Urgent (20 @ 22:50) >  EXAM:  XR CHEST PORTABLE URGENT 1V                          PROCEDURE DATE:  2020      INTERPRETATION:  AP semierect chest on 2020 at 10:10 PM. Patient has weakness.    Heart is likely enlarged.    There is productive bony arthropathy at the right acromioclavicular joint. There are also dystrophic calcifications in the supraspinatus area.    There is some mild right base atelectasis increased from 2019.    IMPRESSION: Heart enlargement. Slight right base atelectasis.    KRYSTAL CHAVIS M.D., ATTENDING RADIOLOGIST  This document has been electronically signed. 2020  7:59AM      ASSESSMENT AND PLAN:  ·	Right base atelectasis/pneumonia.  ·	Right foot cellulitis.  ·	Anemia.  ·	CKD3  ·	HTN.  ·	DM.  ·	PVD.    Started on antibiotics.  Will Add nebulizer and repeat CXR in am.

## 2020-01-30 NOTE — H&P ADULT - NSHPREVIEWOFSYSTEMS_GEN_ALL_CORE
No fever/chills, No photophobia/eye pain/changes in vision, No ear pain/sore throat/dysphagia, No chest pain/palpitations, no SOB/cough/wheeze/stridor, No abdominal pain, No N/V/D, no dysuria/frequency/discharge, No neck/back pain, no rash, no changes in neurological status/function, +right foot/ankle pain, +weakness

## 2020-01-30 NOTE — PHYSICAL THERAPY INITIAL EVALUATION ADULT - CRITERIA FOR SKILLED THERAPEUTIC INTERVENTIONS
therapy frequency/anticipated equipment needs at discharge/anticipated discharge recommendation/functional limitations in following categories/Subacute rehab/predicted duration of therapy intervention/risk reduction/prevention/rehab potential/impairments found

## 2020-01-30 NOTE — H&P ADULT - HISTORY OF PRESENT ILLNESS
80 y/o male w/PMH of mild dementia, HLD, HTN, DM2, seizure disorder presents to the ED today for evaluation of weakness. Pt states 'spiders bit me'. Pt now has weakness. Family at bedside states pt has had cough, treated empirically w/Levaquin, by PMD, pt took x1 dose and symptoms started. Pt denies fever/chills, CP, SOB, N/V/D or fall. Pt reports pain in right foot and ankle where he believes a spider bit him. Per family pt has dementia and occasionally hallucinates. Family  denies recent injury.

## 2020-01-30 NOTE — CHART NOTE - NSCHARTNOTEFT_GEN_A_CORE
HPI:  80 y/o male w/PMH of mild dementia, HLD, HTN, DM2, seizure disorder presents to the ED today for evaluation of weakness. Pt states 'spiders bit me'. Pt now has weakness. Family at bedside states pt has had cough, treated empirically w/Levaquin, by PMD, pt took x1 dose and symptoms started. Pt denies fever/chills, CP, SOB, N/V/D or fall. Pt reports pain in right foot and ankle where he believes a spider bit him. Per family pt has dementia and occasionally hallucinates. Family  denies recent injury. (30 Jan 2020 06:00)                            10.9   7.26  )-----------( 268      ( 30 Jan 2020 08:05 )             31.1     01-30    141  |  105  |  29<H>  ----------------------------<  103<H>  3.6   |  28  |  2.63<H>    Ca    9.2      30 Jan 2020 08:05    TPro  8.8<H>  /  Alb  3.0<L>  /  TBili  0.3  /  DBili  x   /  AST  47<H>  /  ALT  22  /  AlkPhos  106  01-29      MEDICATIONS  (STANDING):  albuterol/ipratropium for Nebulization 3 milliLiter(s) Nebulizer every 6 hours  aspirin  chewable 81 milliGRAM(s) Oral daily  aztreonam  IVPB 500 milliGRAM(s) IV Intermittent every 12 hours  cilostazol 100 milliGRAM(s) Oral two times a day  dextrose 5%. 1000 milliLiter(s) (50 mL/Hr) IV Continuous <Continuous>  dextrose 50% Injectable 12.5 Gram(s) IV Push once  dextrose 50% Injectable 25 Gram(s) IV Push once  dextrose 50% Injectable 25 Gram(s) IV Push once  heparin  Injectable 5000 Unit(s) SubCutaneous every 12 hours  insulin lispro (HumaLOG) corrective regimen sliding scale   SubCutaneous three times a day before meals  levETIRAcetam 500 milliGRAM(s) Oral two times a day  losartan 25 milliGRAM(s) Oral daily  pantoprazole    Tablet 40 milliGRAM(s) Oral before breakfast  sodium chloride 0.9%. 1000 milliLiter(s) (80 mL/Hr) IV Continuous <Continuous>  tamsulosin 0.4 milliGRAM(s) Oral at bedtime    MEDICATIONS  (PRN):  dextrose 40% Gel 15 Gram(s) Oral once PRN Blood Glucose LESS THAN 70 milliGRAM(s)/deciliter  glucagon  Injectable 1 milliGRAM(s) IntraMuscular once PRN Glucose LESS THAN 70 milligrams/deciliter    Assessment and Plan:    Unlikely cellulitis  will d/c antibiotics    CKD-  cr around baseline  will monitor bmp    Rest management as per HPI

## 2020-01-31 LAB
ANION GAP SERPL CALC-SCNC: 5 MMOL/L — SIGNIFICANT CHANGE UP (ref 5–17)
BUN SERPL-MCNC: 22 MG/DL — SIGNIFICANT CHANGE UP (ref 7–23)
CALCIUM SERPL-MCNC: 9.1 MG/DL — SIGNIFICANT CHANGE UP (ref 8.5–10.1)
CHLORIDE SERPL-SCNC: 107 MMOL/L — SIGNIFICANT CHANGE UP (ref 96–108)
CO2 SERPL-SCNC: 29 MMOL/L — SIGNIFICANT CHANGE UP (ref 22–31)
CREAT SERPL-MCNC: 2.25 MG/DL — HIGH (ref 0.5–1.3)
CULTURE RESULTS: SIGNIFICANT CHANGE UP
GLUCOSE BLDC GLUCOMTR-MCNC: 164 MG/DL — HIGH (ref 70–99)
GLUCOSE BLDC GLUCOMTR-MCNC: 208 MG/DL — HIGH (ref 70–99)
GLUCOSE BLDC GLUCOMTR-MCNC: 211 MG/DL — HIGH (ref 70–99)
GLUCOSE BLDC GLUCOMTR-MCNC: 92 MG/DL — SIGNIFICANT CHANGE UP (ref 70–99)
GLUCOSE SERPL-MCNC: 155 MG/DL — HIGH (ref 70–99)
HBA1C BLD-MCNC: 8.2 % — HIGH (ref 4–5.6)
HCT VFR BLD CALC: 31.4 % — LOW (ref 39–50)
HGB BLD-MCNC: 10.8 G/DL — LOW (ref 13–17)
MCHC RBC-ENTMCNC: 29.8 PG — SIGNIFICANT CHANGE UP (ref 27–34)
MCHC RBC-ENTMCNC: 34.4 GM/DL — SIGNIFICANT CHANGE UP (ref 32–36)
MCV RBC AUTO: 86.5 FL — SIGNIFICANT CHANGE UP (ref 80–100)
NRBC # BLD: 0 /100 WBCS — SIGNIFICANT CHANGE UP (ref 0–0)
PLATELET # BLD AUTO: 265 K/UL — SIGNIFICANT CHANGE UP (ref 150–400)
POTASSIUM SERPL-MCNC: 4.1 MMOL/L — SIGNIFICANT CHANGE UP (ref 3.5–5.3)
POTASSIUM SERPL-SCNC: 4.1 MMOL/L — SIGNIFICANT CHANGE UP (ref 3.5–5.3)
RBC # BLD: 3.63 M/UL — LOW (ref 4.2–5.8)
RBC # FLD: 13.3 % — SIGNIFICANT CHANGE UP (ref 10.3–14.5)
SODIUM SERPL-SCNC: 141 MMOL/L — SIGNIFICANT CHANGE UP (ref 135–145)
SPECIMEN SOURCE: SIGNIFICANT CHANGE UP
WBC # BLD: 6.17 K/UL — SIGNIFICANT CHANGE UP (ref 3.8–10.5)
WBC # FLD AUTO: 6.17 K/UL — SIGNIFICANT CHANGE UP (ref 3.8–10.5)

## 2020-01-31 PROCEDURE — 99232 SBSQ HOSP IP/OBS MODERATE 35: CPT

## 2020-01-31 PROCEDURE — 71045 X-RAY EXAM CHEST 1 VIEW: CPT | Mod: 26

## 2020-01-31 RX ORDER — LOSARTAN POTASSIUM 100 MG/1
25 TABLET, FILM COATED ORAL DAILY
Refills: 0 | Status: DISCONTINUED | OUTPATIENT
Start: 2020-01-31 | End: 2020-02-01

## 2020-01-31 RX ADMIN — Medication 1 APPLICATION(S): at 21:37

## 2020-01-31 RX ADMIN — Medication 1: at 07:46

## 2020-01-31 RX ADMIN — TAMSULOSIN HYDROCHLORIDE 0.4 MILLIGRAM(S): 0.4 CAPSULE ORAL at 21:35

## 2020-01-31 RX ADMIN — Medication 3 MILLILITER(S): at 00:39

## 2020-01-31 RX ADMIN — Medication 81 MILLIGRAM(S): at 11:26

## 2020-01-31 RX ADMIN — LOSARTAN POTASSIUM 25 MILLIGRAM(S): 100 TABLET, FILM COATED ORAL at 21:35

## 2020-01-31 RX ADMIN — LEVETIRACETAM 500 MILLIGRAM(S): 250 TABLET, FILM COATED ORAL at 17:34

## 2020-01-31 RX ADMIN — CILOSTAZOL 100 MILLIGRAM(S): 100 TABLET ORAL at 05:13

## 2020-01-31 RX ADMIN — PANTOPRAZOLE SODIUM 40 MILLIGRAM(S): 20 TABLET, DELAYED RELEASE ORAL at 05:24

## 2020-01-31 RX ADMIN — LEVETIRACETAM 500 MILLIGRAM(S): 250 TABLET, FILM COATED ORAL at 05:13

## 2020-01-31 RX ADMIN — LOSARTAN POTASSIUM 25 MILLIGRAM(S): 100 TABLET, FILM COATED ORAL at 05:13

## 2020-01-31 RX ADMIN — CILOSTAZOL 100 MILLIGRAM(S): 100 TABLET ORAL at 17:34

## 2020-01-31 RX ADMIN — Medication 1 APPLICATION(S): at 05:48

## 2020-01-31 RX ADMIN — SODIUM CHLORIDE 80 MILLILITER(S): 9 INJECTION INTRAMUSCULAR; INTRAVENOUS; SUBCUTANEOUS at 05:22

## 2020-01-31 RX ADMIN — Medication 3 MILLILITER(S): at 11:03

## 2020-01-31 RX ADMIN — Medication 3 MILLILITER(S): at 05:35

## 2020-01-31 RX ADMIN — HEPARIN SODIUM 5000 UNIT(S): 5000 INJECTION INTRAVENOUS; SUBCUTANEOUS at 17:35

## 2020-01-31 RX ADMIN — Medication 2: at 11:25

## 2020-01-31 RX ADMIN — Medication 1 APPLICATION(S): at 15:37

## 2020-01-31 RX ADMIN — Medication 3 MILLILITER(S): at 17:36

## 2020-01-31 RX ADMIN — HEPARIN SODIUM 5000 UNIT(S): 5000 INJECTION INTRAVENOUS; SUBCUTANEOUS at 05:13

## 2020-01-31 NOTE — PROGRESS NOTE ADULT - ASSESSMENT
78 y/o male w/PMH of mild dementia, HLD, HTN, DM2, seizure disorder presents to the ED today for evaluation of weakness. Pt states 'spiders bit me'. Pt now has weakness. Family at bedside states pt has had cough, treated empirically w/Levaquin, by PMD, pt took x1 dose and symptoms started. Pt denies fever/chills, CP, SOB, N/V/D or fall. Pt reports pain in right foot and ankle where he believes a spider bit him. Per family pt has dementia and occasionally hallucinates. Seen with     1.mild redness of right leg   unlikely cellulitis  off antibiotics  skin very dry and peeling  continue to use mineral oil.    2. CKD  cr around baseline  monitor bmp    3. PVD  c/w cilostazol    4. DM  monitor FS  c/w ISS    5. Hypertension  c/w losartan    6. Preventive measure  SQ heparin  PT- Rehab.

## 2020-01-31 NOTE — PROGRESS NOTE ADULT - SUBJECTIVE AND OBJECTIVE BOX
Patient is a 79y old  Male who presents with a chief complaint of Weakness and redness, pain left foot.       Subjective: Pt seen and examined.      REVIEW OF SYSTEMS: unable to obtain as pt is demented.      MEDICATIONS  (STANDING):  albuterol/ipratropium for Nebulization 3 milliLiter(s) Nebulizer every 6 hours  aspirin  chewable 81 milliGRAM(s) Oral daily  cilostazol 100 milliGRAM(s) Oral two times a day  dextrose 5%. 1000 milliLiter(s) (50 mL/Hr) IV Continuous <Continuous>  dextrose 50% Injectable 12.5 Gram(s) IV Push once  dextrose 50% Injectable 25 Gram(s) IV Push once  dextrose 50% Injectable 25 Gram(s) IV Push once  heparin  Injectable 5000 Unit(s) SubCutaneous every 12 hours  insulin lispro (HumaLOG) corrective regimen sliding scale   SubCutaneous three times a day before meals  levETIRAcetam 500 milliGRAM(s) Oral two times a day  losartan 25 milliGRAM(s) Oral daily  mineral oil for Topical Use 1 Application(s) Topical three times a day  pantoprazole    Tablet 40 milliGRAM(s) Oral before breakfast  sodium chloride 0.9%. 1000 milliLiter(s) (80 mL/Hr) IV Continuous <Continuous>  tamsulosin 0.4 milliGRAM(s) Oral at bedtime    MEDICATIONS  (PRN):  dextrose 40% Gel 15 Gram(s) Oral once PRN Blood Glucose LESS THAN 70 milliGRAM(s)/deciliter  glucagon  Injectable 1 milliGRAM(s) IntraMuscular once PRN Glucose LESS THAN 70 milligrams/deciliter      Vital Signs Last 24 Hrs  T(C): 37.1 (2020 16:28), Max: 37.1 (2020 16:28)  T(F): 98.8 (2020 16:28), Max: 98.8 (2020 16:28)  HR: 96 (2020 16:28) (80 - 106)  BP: 152/91 (2020 16:28) (114/81 - 173/95)  BP(mean): --  RR: 17 (2020 16:28) (17 - 18)  SpO2: 92% (2020 16:28) (92% - 98%)      PHYSICAL EXAM:    GENERAL: NAD  NERVOUS SYSTEM:  Pt is alert, No focal deficits  CHEST/LUNG: BB/L equal air entry  HEART: S1 S2+, Regular rate and rhythm  ABDOMEN: Soft, Nontender, Nondistended; Bowel sounds present  EXTREMITIES:  2+ Peripheral Pulses, No clubbing, cyanosis, or edema, dry scaly skin      Daily     Daily Weight in k.4 (2020 04:52)  I&O's Summary    2020 07:01  -  2020 07:00  --------------------------------------------------------  IN: 1535 mL / OUT: 300 mL / NET: 1235 mL    2020 07:  -  2020 17:34  --------------------------------------------------------  IN: 360 mL / OUT: 0 mL / NET: 360 mL                            10.8   6.17  )-----------( 265      ( 2020 08:39 )             31.4       141  |  107  |  22  ----------------------------<  155<H>  4.1   |  29  |  2.25<H>    Ca    9.1      2020 08:39    TPro  8.8<H>  /  Alb  3.0<L>  /  TBili  0.3  /  DBili  x   /  AST  47<H>  /  ALT  22  /  AlkPhos  106    PT/INR - ( 2020 22:21 )   PT: 14.1 sec;   INR: 1.25 ratio         PTT - ( 2020 22:21 )  PTT:31.6 secCAPILLARY BLOOD GLUCOSE      POCT Blood Glucose.: 92 mg/dL (2020 16:25)  POCT Blood Glucose.: 208 mg/dL (2020 11:24)  POCT Blood Glucose.: 164 mg/dL (2020 07:25)  POCT Blood Glucose.: 172 mg/dL (2020 21:43)  Urinalysis Basic - ( 2020 03:15 )    Color: Yellow / Appearance: Clear / S.010 / pH: x  Gluc: x / Ketone: Negative  / Bili: Negative / Urobili: Negative mg/dL   Blood: x / Protein: 15 mg/dL / Nitrite: Negative   Leuk Esterase: Negative / RBC: Negative /HPF / WBC 3-5   Sq Epi: x / Non Sq Epi: x / Bacteria: x    CARDIAC MARKERS ( 2020 22:21 )  <.015 ng/mL / x     / x     / x     / x          Culture - Urine (collected 2020 09:14)  Source: .Urine Clean Catch (Midstream)  Final Report (2020 09:12):    <10,000 CFU/mL Normal Urogenital Faviola    Culture - Blood (collected 2020 09:11)  Source: .Blood Blood  Preliminary Report (2020 10:01):    No growth to date.    Culture - Blood (collected 2020 09:11)  Source: .Blood Blood  Preliminary Report (2020 10:01):    No growth to date.        RADIOLOGY & ADDITIONAL STUDIES:

## 2020-01-31 NOTE — PROGRESS NOTE ADULT - SUBJECTIVE AND OBJECTIVE BOX
INTERVAL HPI:  80 y/o male w/PMH of mild dementia, HLD, HTN, DM2, Seizure disorder .  Presented to the ED today for evaluation of weakness. Pt states 'spiders bit me'. Pt now has weakness. Family in ED  stated  pt has had cough, treated empirically w/Levaquin, by PMD, pt took x1 dose and symptoms started. Denies fever/chills, CP, SOB, N/V/D or fall. Pt reports pain in right foot and ankle where he believes a spider bit him. Per family pt has dementia and occasionally hallucinates. Family  denies recent injury.   CXR with right base atelectasis/infiltrate. Denies smoking.    OVERNIGHT EVENTS:  Comfortable in bed.    Vital Signs Last 24 Hrs  T(C): 37.1 (2020 16:28), Max: 37.1 (2020 16:28)  T(F): 98.8 (2020 16:28), Max: 98.8 (2020 16:28)  HR: 96 (2020 16:28) (80 - 106)  BP: 152/91 (2020 16:28) (114/81 - 173/95)  BP(mean): --  RR: 17 (2020 16:28) (17 - 18)  SpO2: 92% (2020 16:28) (92% - 98%)    PHYSICAL EXAM:  GEN:         Awake, responsive and comfortable.  HEENT:    Normal.    RESP:      no wheezing.  CVS:           Regular rate and rhythm.   ABD:         Soft, non-tender, non-distended;     MEDICATIONS  (STANDING):  albuterol/ipratropium for Nebulization 3 milliLiter(s) Nebulizer every 6 hours  aspirin  chewable 81 milliGRAM(s) Oral daily  cilostazol 100 milliGRAM(s) Oral two times a day  dextrose 5%. 1000 milliLiter(s) (50 mL/Hr) IV Continuous <Continuous>  dextrose 50% Injectable 12.5 Gram(s) IV Push once  dextrose 50% Injectable 25 Gram(s) IV Push once  dextrose 50% Injectable 25 Gram(s) IV Push once  heparin  Injectable 5000 Unit(s) SubCutaneous every 12 hours  insulin lispro (HumaLOG) corrective regimen sliding scale   SubCutaneous three times a day before meals  levETIRAcetam 500 milliGRAM(s) Oral two times a day  losartan 25 milliGRAM(s) Oral daily  mineral oil for Topical Use 1 Application(s) Topical three times a day  pantoprazole    Tablet 40 milliGRAM(s) Oral before breakfast  sodium chloride 0.9%. 1000 milliLiter(s) (80 mL/Hr) IV Continuous <Continuous>  tamsulosin 0.4 milliGRAM(s) Oral at bedtime    MEDICATIONS  (PRN):  dextrose 40% Gel 15 Gram(s) Oral once PRN Blood Glucose LESS THAN 70 milliGRAM(s)/deciliter  glucagon  Injectable 1 milliGRAM(s) IntraMuscular once PRN Glucose LESS THAN 70 milligrams/deciliter    LABS:                        10.8   6.17  )-----------( 265      ( 2020 08:39 )             31.4         141  |  107  |  22  ----------------------------<  155<H>  4.1   |  29  |  2.25<H>    Ca    9.1      2020 08:39    TPro  8.8<H>  /  Alb  3.0<L>  /  TBili  0.3  /  DBili  x   /  AST  47<H>  /  ALT  22  /  AlkPhos  106      PT/INR - ( 2020 22:21 )   PT: 14.1 sec;   INR: 1.25 ratio      PTT - ( 2020 22:21 )  PTT:31.6 sec    Urinalysis Basic - ( 2020 03:15 )    Color: Yellow / Appearance: Clear / S.010 / pH: x  Gluc: x / Ketone: Negative  / Bili: Negative / Urobili: Negative mg/dL   Blood: x / Protein: 15 mg/dL / Nitrite: Negative   Leuk Esterase: Negative / RBC: Negative /HPF / WBC 3-5   Sq Epi: x / Non Sq Epi: x / Bacteria: x  < from: Xray Chest 1 View- PORTABLE-Routine (20 @ 07:32) >  EXAM:  XR CHEST PORTABLE ROUTINE 1V                          PROCEDURE DATE:  2020      INTERPRETATION:  Chest portable.    Clinical History: Cellulitis, atelectasis, follow-up evaluation.    Comparison: 2020.    Single AP view submitted.    The evaluation of the cardiomediastinal silhouette is limited on portable technique.  There is a prominent cardiac silhouette.    Again noted is patchy right lower lobe airspace consolidation which may reflect atelectasis and/or pneumonia in the appropriate clinical setting.  Blunting of costophrenic angle either represents trace pleural effusion and/or pleural thickening.    Impression:    Findings as discussed above.    BRIELLE CHARLES M.D., ATTENDING RADIOLOGIST  This document has been electronically signed. 2020 12:55PM      ASSESSMENT AND PLAN:  ·	Right base atelectasis/pneumonia.  ·	Right foot cellulitis.  ·	Anemia.  ·	CKD3  ·	HTN.  ·	DM.  ·	PVD.    Clinically does not appear, off antibiotics.  Discharge planning.

## 2020-02-01 LAB
ANION GAP SERPL CALC-SCNC: 10 MMOL/L — SIGNIFICANT CHANGE UP (ref 5–17)
BUN SERPL-MCNC: 19 MG/DL — SIGNIFICANT CHANGE UP (ref 7–23)
CALCIUM SERPL-MCNC: 8.9 MG/DL — SIGNIFICANT CHANGE UP (ref 8.5–10.1)
CHLORIDE SERPL-SCNC: 106 MMOL/L — SIGNIFICANT CHANGE UP (ref 96–108)
CO2 SERPL-SCNC: 23 MMOL/L — SIGNIFICANT CHANGE UP (ref 22–31)
CREAT SERPL-MCNC: 1.95 MG/DL — HIGH (ref 0.5–1.3)
GLUCOSE BLDC GLUCOMTR-MCNC: 166 MG/DL — HIGH (ref 70–99)
GLUCOSE BLDC GLUCOMTR-MCNC: 174 MG/DL — HIGH (ref 70–99)
GLUCOSE BLDC GLUCOMTR-MCNC: 175 MG/DL — HIGH (ref 70–99)
GLUCOSE BLDC GLUCOMTR-MCNC: 220 MG/DL — HIGH (ref 70–99)
GLUCOSE SERPL-MCNC: 166 MG/DL — HIGH (ref 70–99)
POTASSIUM SERPL-MCNC: 3.9 MMOL/L — SIGNIFICANT CHANGE UP (ref 3.5–5.3)
POTASSIUM SERPL-SCNC: 3.9 MMOL/L — SIGNIFICANT CHANGE UP (ref 3.5–5.3)
SODIUM SERPL-SCNC: 139 MMOL/L — SIGNIFICANT CHANGE UP (ref 135–145)

## 2020-02-01 PROCEDURE — 99233 SBSQ HOSP IP/OBS HIGH 50: CPT

## 2020-02-01 RX ORDER — LOSARTAN POTASSIUM 100 MG/1
100 TABLET, FILM COATED ORAL DAILY
Refills: 0 | Status: DISCONTINUED | OUTPATIENT
Start: 2020-02-01 | End: 2020-02-07

## 2020-02-01 RX ORDER — ATENOLOL 25 MG/1
50 TABLET ORAL DAILY
Refills: 0 | Status: DISCONTINUED | OUTPATIENT
Start: 2020-02-01 | End: 2020-02-06

## 2020-02-01 RX ORDER — DONEPEZIL HYDROCHLORIDE 10 MG/1
10 TABLET, FILM COATED ORAL AT BEDTIME
Refills: 0 | Status: DISCONTINUED | OUTPATIENT
Start: 2020-02-01 | End: 2020-02-07

## 2020-02-01 RX ADMIN — LOSARTAN POTASSIUM 25 MILLIGRAM(S): 100 TABLET, FILM COATED ORAL at 06:02

## 2020-02-01 RX ADMIN — HEPARIN SODIUM 5000 UNIT(S): 5000 INJECTION INTRAVENOUS; SUBCUTANEOUS at 17:19

## 2020-02-01 RX ADMIN — CILOSTAZOL 100 MILLIGRAM(S): 100 TABLET ORAL at 06:02

## 2020-02-01 RX ADMIN — TAMSULOSIN HYDROCHLORIDE 0.4 MILLIGRAM(S): 0.4 CAPSULE ORAL at 21:15

## 2020-02-01 RX ADMIN — Medication 1 APPLICATION(S): at 05:56

## 2020-02-01 RX ADMIN — PANTOPRAZOLE SODIUM 40 MILLIGRAM(S): 20 TABLET, DELAYED RELEASE ORAL at 06:02

## 2020-02-01 RX ADMIN — Medication 1 APPLICATION(S): at 14:16

## 2020-02-01 RX ADMIN — Medication 3 MILLILITER(S): at 00:02

## 2020-02-01 RX ADMIN — LEVETIRACETAM 500 MILLIGRAM(S): 250 TABLET, FILM COATED ORAL at 17:18

## 2020-02-01 RX ADMIN — SODIUM CHLORIDE 80 MILLILITER(S): 9 INJECTION INTRAMUSCULAR; INTRAVENOUS; SUBCUTANEOUS at 01:53

## 2020-02-01 RX ADMIN — Medication 3 MILLILITER(S): at 11:19

## 2020-02-01 RX ADMIN — CILOSTAZOL 100 MILLIGRAM(S): 100 TABLET ORAL at 17:18

## 2020-02-01 RX ADMIN — SODIUM CHLORIDE 80 MILLILITER(S): 9 INJECTION INTRAMUSCULAR; INTRAVENOUS; SUBCUTANEOUS at 17:20

## 2020-02-01 RX ADMIN — Medication 1: at 16:14

## 2020-02-01 RX ADMIN — LEVETIRACETAM 500 MILLIGRAM(S): 250 TABLET, FILM COATED ORAL at 06:02

## 2020-02-01 RX ADMIN — Medication 1 APPLICATION(S): at 21:55

## 2020-02-01 RX ADMIN — ATENOLOL 50 MILLIGRAM(S): 25 TABLET ORAL at 21:15

## 2020-02-01 RX ADMIN — DONEPEZIL HYDROCHLORIDE 10 MILLIGRAM(S): 10 TABLET, FILM COATED ORAL at 21:15

## 2020-02-01 RX ADMIN — Medication 1: at 07:26

## 2020-02-01 RX ADMIN — Medication 1: at 11:07

## 2020-02-01 RX ADMIN — Medication 3 MILLILITER(S): at 05:39

## 2020-02-01 RX ADMIN — Medication 81 MILLIGRAM(S): at 11:08

## 2020-02-01 RX ADMIN — HEPARIN SODIUM 5000 UNIT(S): 5000 INJECTION INTRAVENOUS; SUBCUTANEOUS at 06:02

## 2020-02-01 NOTE — PROGRESS NOTE ADULT - ASSESSMENT
80 y/o male w/PMH of mild dementia, HLD, HTN, DM2, seizure disorder presents to the ED today for evaluation of weakness. Pt states 'spiders bit me'. Pt now has weakness. Family at bedside states pt has had cough, treated empirically w/Levaquin, by PMD, pt took x1 dose and symptoms started. Pt denies fever/chills, CP, SOB, N/V/D or fall. Pt reports pain in right foot and ankle where he believes a spider bit him. Per family pt has dementia and occasionally hallucinates. Seen with     1.mild redness of right leg   unlikely cellulitis  off antibiotics  skin very dry and peeling  continue to use mineral oil.    2. CKD  cr around baseline  monitor bmp    3. PVD  c/w cilostazol    4. DM  monitor FS  c/w ISS    5. Hypertension  c/w losartan    6. Preventive measure  SQ heparin  PT- Rehab. 78 y/o male w/PMH of mild dementia, HLD, HTN, DM2, seizure disorder presents to the ED today for evaluation of weakness. Pt states 'spiders bit me'. Pt now has weakness. Family at bedside states pt has had cough, treated empirically w/Levaquin, by PMD, pt took x1 dose and symptoms started. Pt denies fever/chills, CP, SOB, N/V/D or fall. Pt reports pain in right foot and ankle where he believes a spider bit him. Per family pt has dementia and occasionally hallucinates. Seen with     1.mild redness of right leg   unlikely cellulitis, ff antibiotics  skin very dry and peeling  continue to use mineral oil.    2. CKD  cr around baseline, monitor bmp, IVF until creatinine levels off    3. PVD  c/w cilostazol 100 mg bid    4. DM  monitor FS  c/w ISS    5. Hypertension  c/w losartan 25 mg/day    6. Preventive measure  SQ heparin  PT- Rehab. 78 y/o male w/PMH of mild dementia, HLD, HTN, DM2, seizure disorder presents to the ED today for evaluation of weakness. Pt states 'spiders bit me'. Pt now has weakness. Family at bedside states pt has had cough, treated empirically w/Levaquin, by PMD, pt took x1 dose and symptoms started. Pt denies fever/chills, CP, SOB, N/V/D or fall. Pt reports pain in right foot and ankle where he believes a spider bit him. Per family pt has dementia and occasionally hallucinates. Seen with     1.mild redness of right leg   unlikely cellulitis, ff antibiotics  skin very dry and peeling  continue to use mineral oil. No need for ABX.     2. CKD  cr around baseline, monitor bmp, IVF until creatinine levels off.     3. PVD  c/w cilostazol 100 mg bid    4. DM  monitor FS  c/w ISS    5. Hypertension  c/w losartan 100 mg/day, resume Atenolol 50 mg/day    6. Preventive measure  SQ heparin    PT- Rehab Monday.

## 2020-02-01 NOTE — PROGRESS NOTE ADULT - SUBJECTIVE AND OBJECTIVE BOX
INTERVAL HPI:  80 y/o male w/PMH of mild dementia, HLD, HTN, DM2, Seizure disorder .  Presented to the ED today for evaluation of weakness. Pt states 'spiders bit me'. Pt now has weakness. Family in ED  stated  pt has had cough, treated empirically w/Levaquin, by PMD, pt took x1 dose and symptoms started. Denies fever/chills, CP, SOB, N/V/D or fall. Pt reports pain in right foot and ankle where he believes a spider bit him. Per family pt has dementia and occasionally hallucinates. Family  denies recent injury.   CXR with right base atelectasis/infiltrate. Denies smoking.    OVERNIGHT EVENTS:  Awake and comfortable.    Vital Signs Last 24 Hrs  T(C): 37.5 (01 Feb 2020 16:39), Max: 37.5 (01 Feb 2020 16:39)  T(F): 99.5 (01 Feb 2020 16:39), Max: 99.5 (01 Feb 2020 16:39)  HR: 102 (01 Feb 2020 16:39) (92 - 111)  BP: 145/91 (01 Feb 2020 16:39) (106/69 - 153/85)  BP(mean): --  RR: 17 (01 Feb 2020 16:39) (16 - 18)  SpO2: 97% (01 Feb 2020 16:39) (94% - 97%)    PHYSICAL EXAM:  GEN:         Awake, responsive and comfortable.  HEENT:    Normal.    RESP:        no wheezing.  CVS:           Regular rate and rhythm.   ABD:         Soft, non-tender, non-distended;     MEDICATIONS  (STANDING):  aspirin  chewable 81 milliGRAM(s) Oral daily  ATENolol  Tablet 50 milliGRAM(s) Oral daily  cilostazol 100 milliGRAM(s) Oral two times a day  dextrose 5%. 1000 milliLiter(s) (50 mL/Hr) IV Continuous <Continuous>  dextrose 50% Injectable 12.5 Gram(s) IV Push once  dextrose 50% Injectable 25 Gram(s) IV Push once  dextrose 50% Injectable 25 Gram(s) IV Push once  donepezil 10 milliGRAM(s) Oral at bedtime  heparin  Injectable 5000 Unit(s) SubCutaneous every 12 hours  insulin lispro (HumaLOG) corrective regimen sliding scale   SubCutaneous three times a day before meals  levETIRAcetam 500 milliGRAM(s) Oral two times a day  losartan 100 milliGRAM(s) Oral daily  mineral oil for Topical Use 1 Application(s) Topical three times a day  pantoprazole    Tablet 40 milliGRAM(s) Oral before breakfast  sodium chloride 0.9%. 1000 milliLiter(s) (80 mL/Hr) IV Continuous <Continuous>  tamsulosin 0.4 milliGRAM(s) Oral at bedtime    MEDICATIONS  (PRN):  dextrose 40% Gel 15 Gram(s) Oral once PRN Blood Glucose LESS THAN 70 milliGRAM(s)/deciliter  glucagon  Injectable 1 milliGRAM(s) IntraMuscular once PRN Glucose LESS THAN 70 milligrams/deciliter    LABS:                        10.8   6.17  )-----------( 265      ( 31 Jan 2020 08:39 )             31.4     02-01    139  |  106  |  19  ----------------------------<  166<H>  3.9   |  23  |  1.95<H>    Ca    8.9      01 Feb 2020 07:11    ASSESSMENT AND PLAN:  ·	Right base atelectasis/effusion.  ·	Right foot cellulitis.  ·	Anemia.  ·	CKD3  ·	HTN.  ·	DM.  ·	PVD.    Being hydrated for pre renal azotemia.  Pulmonary stable.

## 2020-02-01 NOTE — PROGRESS NOTE ADULT - SUBJECTIVE AND OBJECTIVE BOX
INTERVAL HPI/OVERNIGHT EVENTS:  Pt seen and examined at bedside.     Allergies/Intolerance: Cipro (Hives)  codeine (Other)  penicillins (Unknown)      MEDICATIONS  (STANDING):  albuterol/ipratropium for Nebulization 3 milliLiter(s) Nebulizer every 6 hours  aspirin  chewable 81 milliGRAM(s) Oral daily  cilostazol 100 milliGRAM(s) Oral two times a day  dextrose 5%. 1000 milliLiter(s) (50 mL/Hr) IV Continuous <Continuous>  dextrose 50% Injectable 12.5 Gram(s) IV Push once  dextrose 50% Injectable 25 Gram(s) IV Push once  dextrose 50% Injectable 25 Gram(s) IV Push once  heparin  Injectable 5000 Unit(s) SubCutaneous every 12 hours  insulin lispro (HumaLOG) corrective regimen sliding scale   SubCutaneous three times a day before meals  levETIRAcetam 500 milliGRAM(s) Oral two times a day  losartan 25 milliGRAM(s) Oral daily  mineral oil for Topical Use 1 Application(s) Topical three times a day  pantoprazole    Tablet 40 milliGRAM(s) Oral before breakfast  sodium chloride 0.9%. 1000 milliLiter(s) (80 mL/Hr) IV Continuous <Continuous>  tamsulosin 0.4 milliGRAM(s) Oral at bedtime    MEDICATIONS  (PRN):  dextrose 40% Gel 15 Gram(s) Oral once PRN Blood Glucose LESS THAN 70 milliGRAM(s)/deciliter  glucagon  Injectable 1 milliGRAM(s) IntraMuscular once PRN Glucose LESS THAN 70 milligrams/deciliter        ROS: all systems reviewed and wnl      PHYSICAL EXAMINATION:  Vital Signs Last 24 Hrs  T(C): 36.6 (01 Feb 2020 05:03), Max: 37.1 (31 Jan 2020 16:28)  T(F): 97.8 (01 Feb 2020 05:03), Max: 98.8 (31 Jan 2020 16:28)  HR: 111 (01 Feb 2020 05:03) (86 - 111)  BP: 153/85 (01 Feb 2020 05:03) (106/69 - 153/85)  BP(mean): --  RR: 16 (01 Feb 2020 05:03) (16 - 18)  SpO2: 96% (01 Feb 2020 05:03) (92% - 97%)  CAPILLARY BLOOD GLUCOSE      POCT Blood Glucose.: 174 mg/dL (01 Feb 2020 07:24)  POCT Blood Glucose.: 211 mg/dL (31 Jan 2020 21:34)  POCT Blood Glucose.: 92 mg/dL (31 Jan 2020 16:25)  POCT Blood Glucose.: 208 mg/dL (31 Jan 2020 11:24)      01-31 @ 07:01  -  02-01 @ 07:00  --------------------------------------------------------  IN: 1440 mL / OUT: 0 mL / NET: 1440 mL        GENERAL:   NECK: supple, No JVD  CHEST/LUNG: clear to auscultation bilaterally; no rales, rhonchi, or wheezing b/l  HEART: normal S1, S2  ABDOMEN: BS+, soft, ND, NT   EXTREMITIES:  pulses palpable; no clubbing, cyanosis, or edema b/l LEs  SKIN: no rashes or lesions      LABS:                        10.8   6.17  )-----------( 265      ( 31 Jan 2020 08:39 )             31.4     02-01    139  |  106  |  19  ----------------------------<  166<H>  3.9   |  23  |  1.95<H>    Ca    8.9      01 Feb 2020 07:11 INTERVAL HPI/OVERNIGHT EVENTS:  Pt seen and examined at bedside.     Allergies/Intolerance: Cipro (Hives)  codeine (Other)  penicillins (Unknown)      MEDICATIONS  (STANDING):  albuterol/ipratropium for Nebulization 3 milliLiter(s) Nebulizer every 6 hours  aspirin  chewable 81 milliGRAM(s) Oral daily  cilostazol 100 milliGRAM(s) Oral two times a day  dextrose 5%. 1000 milliLiter(s) (50 mL/Hr) IV Continuous <Continuous>  dextrose 50% Injectable 12.5 Gram(s) IV Push once  dextrose 50% Injectable 25 Gram(s) IV Push once  dextrose 50% Injectable 25 Gram(s) IV Push once  heparin  Injectable 5000 Unit(s) SubCutaneous every 12 hours  insulin lispro (HumaLOG) corrective regimen sliding scale   SubCutaneous three times a day before meals  levETIRAcetam 500 milliGRAM(s) Oral two times a day  losartan 25 milliGRAM(s) Oral daily  mineral oil for Topical Use 1 Application(s) Topical three times a day  pantoprazole    Tablet 40 milliGRAM(s) Oral before breakfast  sodium chloride 0.9%. 1000 milliLiter(s) (80 mL/Hr) IV Continuous <Continuous>  tamsulosin 0.4 milliGRAM(s) Oral at bedtime    MEDICATIONS  (PRN):  dextrose 40% Gel 15 Gram(s) Oral once PRN Blood Glucose LESS THAN 70 milliGRAM(s)/deciliter  glucagon  Injectable 1 milliGRAM(s) IntraMuscular once PRN Glucose LESS THAN 70 milligrams/deciliter        ROS: all systems reviewed and wnl      PHYSICAL EXAMINATION:  Vital Signs Last 24 Hrs  T(C): 36.6 (01 Feb 2020 05:03), Max: 37.1 (31 Jan 2020 16:28)  T(F): 97.8 (01 Feb 2020 05:03), Max: 98.8 (31 Jan 2020 16:28)  HR: 111 (01 Feb 2020 05:03) (86 - 111)  BP: 153/85 (01 Feb 2020 05:03) (106/69 - 153/85)  BP(mean): --  RR: 16 (01 Feb 2020 05:03) (16 - 18)  SpO2: 96% (01 Feb 2020 05:03) (92% - 97%)  CAPILLARY BLOOD GLUCOSE      POCT Blood Glucose.: 174 mg/dL (01 Feb 2020 07:24)  POCT Blood Glucose.: 211 mg/dL (31 Jan 2020 21:34)  POCT Blood Glucose.: 92 mg/dL (31 Jan 2020 16:25)  POCT Blood Glucose.: 208 mg/dL (31 Jan 2020 11:24)      01-31 @ 07:01  -  02-01 @ 07:00  --------------------------------------------------------  IN: 1440 mL / OUT: 0 mL / NET: 1440 mL        GENERAL: appears comfortable, no fevers, CP or SOB, baseline dementia  NECK: supple, No JVD  CHEST/LUNG: clear to auscultation bilaterally; no rales, rhonchi, or wheezing b/l  HEART: normal S1, S2  ABDOMEN: BS+, soft, ND, NT   EXTREMITIES:  pulses palpable; no clubbing, cyanosis, or edema b/l LEs  SKIN: no rashes or lesions      LABS:                        10.8   6.17  )-----------( 265      ( 31 Jan 2020 08:39 )             31.4     02-01    139  |  106  |  19  ----------------------------<  166<H>  3.9   |  23  |  1.95<H>    Ca    8.9      01 Feb 2020 07:11

## 2020-02-02 LAB
ANION GAP SERPL CALC-SCNC: 7 MMOL/L — SIGNIFICANT CHANGE UP (ref 5–17)
BUN SERPL-MCNC: 15 MG/DL — SIGNIFICANT CHANGE UP (ref 7–23)
CALCIUM SERPL-MCNC: 8.7 MG/DL — SIGNIFICANT CHANGE UP (ref 8.5–10.1)
CHLORIDE SERPL-SCNC: 111 MMOL/L — HIGH (ref 96–108)
CO2 SERPL-SCNC: 24 MMOL/L — SIGNIFICANT CHANGE UP (ref 22–31)
CREAT SERPL-MCNC: 1.69 MG/DL — HIGH (ref 0.5–1.3)
GLUCOSE BLDC GLUCOMTR-MCNC: 116 MG/DL — HIGH (ref 70–99)
GLUCOSE BLDC GLUCOMTR-MCNC: 118 MG/DL — HIGH (ref 70–99)
GLUCOSE BLDC GLUCOMTR-MCNC: 140 MG/DL — HIGH (ref 70–99)
GLUCOSE BLDC GLUCOMTR-MCNC: 160 MG/DL — HIGH (ref 70–99)
GLUCOSE SERPL-MCNC: 136 MG/DL — HIGH (ref 70–99)
POTASSIUM SERPL-MCNC: 4.2 MMOL/L — SIGNIFICANT CHANGE UP (ref 3.5–5.3)
POTASSIUM SERPL-SCNC: 4.2 MMOL/L — SIGNIFICANT CHANGE UP (ref 3.5–5.3)
SODIUM SERPL-SCNC: 142 MMOL/L — SIGNIFICANT CHANGE UP (ref 135–145)

## 2020-02-02 PROCEDURE — 71045 X-RAY EXAM CHEST 1 VIEW: CPT | Mod: 26

## 2020-02-02 PROCEDURE — 99233 SBSQ HOSP IP/OBS HIGH 50: CPT

## 2020-02-02 RX ADMIN — HEPARIN SODIUM 5000 UNIT(S): 5000 INJECTION INTRAVENOUS; SUBCUTANEOUS at 17:29

## 2020-02-02 RX ADMIN — SODIUM CHLORIDE 80 MILLILITER(S): 9 INJECTION INTRAMUSCULAR; INTRAVENOUS; SUBCUTANEOUS at 03:53

## 2020-02-02 RX ADMIN — Medication 81 MILLIGRAM(S): at 11:42

## 2020-02-02 RX ADMIN — HEPARIN SODIUM 5000 UNIT(S): 5000 INJECTION INTRAVENOUS; SUBCUTANEOUS at 05:06

## 2020-02-02 RX ADMIN — Medication 200 MILLIGRAM(S): at 20:38

## 2020-02-02 RX ADMIN — TAMSULOSIN HYDROCHLORIDE 0.4 MILLIGRAM(S): 0.4 CAPSULE ORAL at 21:53

## 2020-02-02 RX ADMIN — PANTOPRAZOLE SODIUM 40 MILLIGRAM(S): 20 TABLET, DELAYED RELEASE ORAL at 05:06

## 2020-02-02 RX ADMIN — LEVETIRACETAM 500 MILLIGRAM(S): 250 TABLET, FILM COATED ORAL at 05:06

## 2020-02-02 RX ADMIN — LEVETIRACETAM 500 MILLIGRAM(S): 250 TABLET, FILM COATED ORAL at 17:29

## 2020-02-02 RX ADMIN — LOSARTAN POTASSIUM 100 MILLIGRAM(S): 100 TABLET, FILM COATED ORAL at 05:06

## 2020-02-02 RX ADMIN — CILOSTAZOL 100 MILLIGRAM(S): 100 TABLET ORAL at 17:29

## 2020-02-02 RX ADMIN — DONEPEZIL HYDROCHLORIDE 10 MILLIGRAM(S): 10 TABLET, FILM COATED ORAL at 21:53

## 2020-02-02 RX ADMIN — Medication 1: at 11:41

## 2020-02-02 RX ADMIN — CILOSTAZOL 100 MILLIGRAM(S): 100 TABLET ORAL at 05:06

## 2020-02-02 RX ADMIN — Medication 1 APPLICATION(S): at 15:46

## 2020-02-02 RX ADMIN — SODIUM CHLORIDE 80 MILLILITER(S): 9 INJECTION INTRAMUSCULAR; INTRAVENOUS; SUBCUTANEOUS at 19:48

## 2020-02-02 RX ADMIN — ATENOLOL 50 MILLIGRAM(S): 25 TABLET ORAL at 05:06

## 2020-02-02 RX ADMIN — Medication 1 APPLICATION(S): at 22:26

## 2020-02-02 NOTE — PROGRESS NOTE ADULT - SUBJECTIVE AND OBJECTIVE BOX
INTERVAL HPI:  80 y/o male w/PMH of mild dementia, HLD, HTN, DM2, Seizure disorder .  Presented to the ED today for evaluation of weakness. Pt states 'spiders bit me'. Pt now has weakness. Family in ED  stated  pt has had cough, treated empirically w/Levaquin, by PMD, pt took x1 dose and symptoms started. Denies fever/chills, CP, SOB, N/V/D or fall. Pt reports pain in right foot and ankle where he believes a spider bit him. Per family pt has dementia and occasionally hallucinates. Family  denies recent injury.   CXR with right base atelectasis/infiltrate. Denies smoking.    OVERNIGHT EVENTS:  Comfortable but confused    Vital Signs Last 24 Hrs  T(C): 37.4 (02 Feb 2020 18:03), Max: 37.6 (02 Feb 2020 12:56)  T(F): 99.3 (02 Feb 2020 18:03), Max: 99.7 (02 Feb 2020 12:56)  HR: 100 (02 Feb 2020 18:03) (88 - 107)  BP: 152/80 (02 Feb 2020 18:03) (144/93 - 176/100)  BP(mean): --  RR: 18 (02 Feb 2020 18:03) (18 - 19)  SpO2: 94% (02 Feb 2020 18:03) (90% - 97%)    PHYSICAL EXAM:  GEN:         Awake, responsive and comfortable.  HEENT:    Normal.    RESP:       no wheezing  CVS:          Regular rate and rhythm.   ABD:         Soft, non-tender, non-distended;     MEDICATIONS  (STANDING):  aspirin  chewable 81 milliGRAM(s) Oral daily  ATENolol  Tablet 50 milliGRAM(s) Oral daily  cilostazol 100 milliGRAM(s) Oral two times a day  dextrose 5%. 1000 milliLiter(s) (50 mL/Hr) IV Continuous <Continuous>  dextrose 50% Injectable 12.5 Gram(s) IV Push once  dextrose 50% Injectable 25 Gram(s) IV Push once  dextrose 50% Injectable 25 Gram(s) IV Push once  donepezil 10 milliGRAM(s) Oral at bedtime  heparin  Injectable 5000 Unit(s) SubCutaneous every 12 hours  insulin lispro (HumaLOG) corrective regimen sliding scale   SubCutaneous three times a day before meals  levETIRAcetam 500 milliGRAM(s) Oral two times a day  losartan 100 milliGRAM(s) Oral daily  mineral oil for Topical Use 1 Application(s) Topical three times a day  pantoprazole    Tablet 40 milliGRAM(s) Oral before breakfast  sodium chloride 0.9%. 1000 milliLiter(s) (80 mL/Hr) IV Continuous <Continuous>  tamsulosin 0.4 milliGRAM(s) Oral at bedtime    MEDICATIONS  (PRN):  dextrose 40% Gel 15 Gram(s) Oral once PRN Blood Glucose LESS THAN 70 milliGRAM(s)/deciliter  glucagon  Injectable 1 milliGRAM(s) IntraMuscular once PRN Glucose LESS THAN 70 milligrams/deciliter  guaiFENesin   Syrup  (Sugar-Free) 200 milliGRAM(s) Oral every 6 hours PRN Cough    LABS:    02-02    142  |  111<H>  |  15  ----------------------------<  136<H>  4.2   |  24  |  1.69<H>    Ca    8.7      02 Feb 2020 08:30    ASSESSMENT AND PLAN:  ·	Right base atelectasis/effusion.  ·	Right foot cellulitis.  ·	Anemia.  ·	CKD3  ·	HTN.  ·	DM.  ·	PVD.    Pulmonary stable.  Discharge planning.

## 2020-02-02 NOTE — PROGRESS NOTE ADULT - ASSESSMENT
78 y/o male w/PMH of mild dementia, HLD, HTN, DM2, seizure disorder presents to the ED today for evaluation of weakness. Pt states 'spiders bit me'. Pt now has weakness. Family at bedside states pt has had cough, treated empirically w/Levaquin, by PMD, pt took x1 dose and symptoms started. Pt denies fever/chills, CP, SOB, N/V/D or fall. Pt reports pain in right foot and ankle where he believes a spider bit him. Per family pt has dementia and occasionally hallucinates. Seen with     1.mild redness of right leg   unlikely cellulitis, ff antibiotics  skin very dry and peeling  continue to use mineral oil. No need for ABX.     2. CKD  cr around baseline, monitor bmp, IVF until creatinine levels off.     3. PVD  c/w cilostazol 100 mg bid    4. DM  monitor FS  c/w ISS    5. Hypertension  c/w losartan 100 mg/day, resume Atenolol 50 mg/day    6. Preventive measure  SQ heparin  7. cough - chest xray pulmonary Follow up       PT- Rehab Monday.

## 2020-02-02 NOTE — PROGRESS NOTE ADULT - SUBJECTIVE AND OBJECTIVE BOX
CHIEF COMPLAINT/INTERVAL HISTORY:    Patient is a 79y old  Male who presents with a chief complaint of Weakness and redness, pain left foot. (01 Feb 2020 19:15)      HPI:  78 y/o male w/PMH of mild dementia, HLD, HTN, DM2, seizure disorder presents to the ED today for evaluation of weakness. Pt states 'spiders bit me'. Pt now has weakness. Family at bedside states pt has had cough, treated empirically w/Levaquin, by PMD, pt took x1 dose and symptoms started. Pt denies fever/chills, CP, SOB, N/V/D or fall. Pt reports pain in right foot and ankle where he believes a spider bit him. Per family pt has dementia and occasionally hallucinates. Family  denies recent injury. (30 Jan 2020 06:00)    Overnight issues   continues to complain of of peresistent cough   pulmonary note appreciated             SUBJECTIVE & OBJECTIVE: Pt seen and examined at bedside.   ROS:  CONSTITUTIONAL: No fever, weight loss, or fatigue  NECK: No pain or stiffness  RESPIRATORY: persistent  cough, wheezing, chills or hemoptysis; No shortness of breath  CARDIOVASCULAR: No chest pain, palpitations, dizziness, or leg swelling  GASTROINTESTINAL: No abdominal or epigastric pain. No nausea, vomiting, or hematemesis; No diarrhea or constipation. No melena or hematochezia.  GENITOURINARY: No dysuria, frequency, hematuria, or incontinence  NEUROLOGICAL: No headaches, memory loss, loss of strength, numbness, or tremors  SKIN: No itching, burning, rashes, or lesions   ICU Vital Signs Last 24 Hrs  T(C): 37.3 (02 Feb 2020 11:02), Max: 37.5 (01 Feb 2020 16:39)  T(F): 99.2 (02 Feb 2020 11:02), Max: 99.5 (01 Feb 2020 16:39)  HR: 98 (02 Feb 2020 11:02) (88 - 107)  BP: 168/116 (02 Feb 2020 11:02) (144/93 - 176/100)  BP(mean): --  ABP: --  ABP(mean): --  RR: 19 (02 Feb 2020 11:02) (17 - 19)  SpO2: 90% (02 Feb 2020 11:02) (90% - 97%)        MEDICATIONS  (STANDING):  aspirin  chewable 81 milliGRAM(s) Oral daily  ATENolol  Tablet 50 milliGRAM(s) Oral daily  cilostazol 100 milliGRAM(s) Oral two times a day  dextrose 5%. 1000 milliLiter(s) (50 mL/Hr) IV Continuous <Continuous>  dextrose 50% Injectable 12.5 Gram(s) IV Push once  dextrose 50% Injectable 25 Gram(s) IV Push once  dextrose 50% Injectable 25 Gram(s) IV Push once  donepezil 10 milliGRAM(s) Oral at bedtime  heparin  Injectable 5000 Unit(s) SubCutaneous every 12 hours  insulin lispro (HumaLOG) corrective regimen sliding scale   SubCutaneous three times a day before meals  levETIRAcetam 500 milliGRAM(s) Oral two times a day  losartan 100 milliGRAM(s) Oral daily  mineral oil for Topical Use 1 Application(s) Topical three times a day  pantoprazole    Tablet 40 milliGRAM(s) Oral before breakfast  sodium chloride 0.9%. 1000 milliLiter(s) (80 mL/Hr) IV Continuous <Continuous>  tamsulosin 0.4 milliGRAM(s) Oral at bedtime    MEDICATIONS  (PRN):  dextrose 40% Gel 15 Gram(s) Oral once PRN Blood Glucose LESS THAN 70 milliGRAM(s)/deciliter  glucagon  Injectable 1 milliGRAM(s) IntraMuscular once PRN Glucose LESS THAN 70 milligrams/deciliter        PHYSICAL EXAM:    GENERAL: NAD, well-groomed, well-developed  HEAD:  Atraumatic, Normocephalic  EYES: EOMI, PERRLA, conjunctiva and sclera clear  ENMT: Moist mucous membranes  NECK: Supple, No JVD  NERVOUS SYSTEM:  Alert & Oriented X3, Motor Strength 5/5 B/L upper and lower extremities; DTRs 2+ intact and symmetric  CHEST/LUNG: Clear to auscultation bilaterally; No rales, rhonchi, wheezing, or rubs  HEART: Regular rate and rhythm; No murmurs, rubs, or gallops  ABDOMEN: Soft, Nontender, Nondistended; Bowel sounds present  EXTREMITIES:  2+ Peripheral Pulses, No clubbing, cyanosis, or edema    LABS:    02-02    142  |  111<H>  |  15  ----------------------------<  136<H>  4.2   |  24  |  1.69<H>    Ca    8.7      02 Feb 2020 08:30            CAPILLARY BLOOD GLUCOSE      POCT Blood Glucose.: 140 mg/dL (02 Feb 2020 08:18)  POCT Blood Glucose.: 220 mg/dL (01 Feb 2020 21:13)  POCT Blood Glucose.: 166 mg/dL (01 Feb 2020 16:13)      RECENT CULTURES:      RADIOLOGY & ADDITIONAL TESTS:  Imaging Personally Reviewed:  [ ] YES      Consultant(s) Notes Reviewed:  [ ] YES     Care Discussed with [ ] Consultants [X ] Patient [ ] Family  [x ]    [x ]  Other; RN  HEALTH ISSUES - PROBLEM Dx:  Essential hypertension: Essential hypertension  Type 2 diabetes mellitus with diabetic nephropathy, with long-term current use of insulin: Type 2 diabetes mellitus with diabetic nephropathy, with long-term current use of insulin  PVD (peripheral vascular disease): PVD (peripheral vascular disease)  Gastroesophageal reflux disease, esophagitis presence not specified: Gastroesophageal reflux disease, esophagitis presence not specified  Acute on chronic renal insufficiency: Acute on chronic renal insufficiency  Cellulitis of right lower extremity: Cellulitis of right lower extremity        DVT/GI ppx  Discussed with pt @ bedside

## 2020-02-03 LAB
ANION GAP SERPL CALC-SCNC: 12 MMOL/L — SIGNIFICANT CHANGE UP (ref 5–17)
BUN SERPL-MCNC: 20 MG/DL — SIGNIFICANT CHANGE UP (ref 7–23)
CALCIUM SERPL-MCNC: 8.8 MG/DL — SIGNIFICANT CHANGE UP (ref 8.5–10.1)
CHLORIDE SERPL-SCNC: 111 MMOL/L — HIGH (ref 96–108)
CO2 SERPL-SCNC: 19 MMOL/L — LOW (ref 22–31)
CREAT SERPL-MCNC: 1.76 MG/DL — HIGH (ref 0.5–1.3)
GLUCOSE BLDC GLUCOMTR-MCNC: 100 MG/DL — HIGH (ref 70–99)
GLUCOSE BLDC GLUCOMTR-MCNC: 108 MG/DL — HIGH (ref 70–99)
GLUCOSE BLDC GLUCOMTR-MCNC: 111 MG/DL — HIGH (ref 70–99)
GLUCOSE BLDC GLUCOMTR-MCNC: 125 MG/DL — HIGH (ref 70–99)
GLUCOSE SERPL-MCNC: 103 MG/DL — HIGH (ref 70–99)
HCT VFR BLD CALC: 31.8 % — LOW (ref 39–50)
HGB BLD-MCNC: 11 G/DL — LOW (ref 13–17)
MCHC RBC-ENTMCNC: 30 PG — SIGNIFICANT CHANGE UP (ref 27–34)
MCHC RBC-ENTMCNC: 34.6 GM/DL — SIGNIFICANT CHANGE UP (ref 32–36)
MCV RBC AUTO: 86.6 FL — SIGNIFICANT CHANGE UP (ref 80–100)
NRBC # BLD: 0 /100 WBCS — SIGNIFICANT CHANGE UP (ref 0–0)
PLATELET # BLD AUTO: 243 K/UL — SIGNIFICANT CHANGE UP (ref 150–400)
POTASSIUM SERPL-MCNC: 4.1 MMOL/L — SIGNIFICANT CHANGE UP (ref 3.5–5.3)
POTASSIUM SERPL-SCNC: 4.1 MMOL/L — SIGNIFICANT CHANGE UP (ref 3.5–5.3)
RBC # BLD: 3.67 M/UL — LOW (ref 4.2–5.8)
RBC # FLD: 13.5 % — SIGNIFICANT CHANGE UP (ref 10.3–14.5)
SODIUM SERPL-SCNC: 142 MMOL/L — SIGNIFICANT CHANGE UP (ref 135–145)
WBC # BLD: 8.81 K/UL — SIGNIFICANT CHANGE UP (ref 3.8–10.5)
WBC # FLD AUTO: 8.81 K/UL — SIGNIFICANT CHANGE UP (ref 3.8–10.5)

## 2020-02-03 PROCEDURE — 99232 SBSQ HOSP IP/OBS MODERATE 35: CPT

## 2020-02-03 RX ORDER — SODIUM CHLORIDE 9 MG/ML
1000 INJECTION, SOLUTION INTRAVENOUS
Refills: 0 | Status: DISCONTINUED | OUTPATIENT
Start: 2020-02-03 | End: 2020-02-04

## 2020-02-03 RX ORDER — SODIUM CHLORIDE 0.65 %
2 AEROSOL, SPRAY (ML) NASAL EVERY 6 HOURS
Refills: 0 | Status: DISCONTINUED | OUTPATIENT
Start: 2020-02-03 | End: 2020-02-07

## 2020-02-03 RX ADMIN — TAMSULOSIN HYDROCHLORIDE 0.4 MILLIGRAM(S): 0.4 CAPSULE ORAL at 22:20

## 2020-02-03 RX ADMIN — Medication 1 APPLICATION(S): at 13:03

## 2020-02-03 RX ADMIN — LOSARTAN POTASSIUM 100 MILLIGRAM(S): 100 TABLET, FILM COATED ORAL at 05:51

## 2020-02-03 RX ADMIN — DONEPEZIL HYDROCHLORIDE 10 MILLIGRAM(S): 10 TABLET, FILM COATED ORAL at 22:20

## 2020-02-03 RX ADMIN — Medication 200 MILLIGRAM(S): at 05:43

## 2020-02-03 RX ADMIN — SODIUM CHLORIDE 75 MILLILITER(S): 9 INJECTION, SOLUTION INTRAVENOUS at 18:47

## 2020-02-03 RX ADMIN — Medication 1 APPLICATION(S): at 22:24

## 2020-02-03 RX ADMIN — HEPARIN SODIUM 5000 UNIT(S): 5000 INJECTION INTRAVENOUS; SUBCUTANEOUS at 17:32

## 2020-02-03 RX ADMIN — CILOSTAZOL 100 MILLIGRAM(S): 100 TABLET ORAL at 05:43

## 2020-02-03 RX ADMIN — Medication 2 SPRAY(S): at 23:36

## 2020-02-03 RX ADMIN — Medication 2 SPRAY(S): at 18:46

## 2020-02-03 RX ADMIN — Medication 1 APPLICATION(S): at 05:46

## 2020-02-03 RX ADMIN — LEVETIRACETAM 500 MILLIGRAM(S): 250 TABLET, FILM COATED ORAL at 05:43

## 2020-02-03 RX ADMIN — ATENOLOL 50 MILLIGRAM(S): 25 TABLET ORAL at 05:42

## 2020-02-03 RX ADMIN — Medication 81 MILLIGRAM(S): at 13:02

## 2020-02-03 RX ADMIN — HEPARIN SODIUM 5000 UNIT(S): 5000 INJECTION INTRAVENOUS; SUBCUTANEOUS at 05:43

## 2020-02-03 RX ADMIN — PANTOPRAZOLE SODIUM 40 MILLIGRAM(S): 20 TABLET, DELAYED RELEASE ORAL at 05:51

## 2020-02-03 RX ADMIN — Medication 200 MILLIGRAM(S): at 17:34

## 2020-02-03 RX ADMIN — LEVETIRACETAM 500 MILLIGRAM(S): 250 TABLET, FILM COATED ORAL at 17:32

## 2020-02-03 RX ADMIN — CILOSTAZOL 100 MILLIGRAM(S): 100 TABLET ORAL at 17:32

## 2020-02-03 NOTE — DIETITIAN INITIAL EVALUATION ADULT. - OTHER INFO
Pt appeared confused/disoriented, unable to obtain diet hx or wt hx from pt, and no family present at bedside. Pt reported reduction in appetite due to irritated leg from spider bite, however per chart review, pt's family reported pt has dementia and occasionally hallucinates, and per MD note pt likely not with cellulitis as previously suspected. Per chart review, pt weighed 85.5 kg x 1 year ago (2/23/19), pt currently 88.5 kg. Pt with elevated A1c of 8.2%, however not appropriate for nutrition education, left educational materials at bedside for family.

## 2020-02-03 NOTE — PROGRESS NOTE ADULT - ASSESSMENT
78 y/o male w/PMH of mild dementia, HLD, HTN, DM2, seizure disorder presents to the ED today for evaluation of weakness. Pt states 'spiders bit me'. Pt now has weakness. Family at bedside states pt has had cough, treated empirically w/Levaquin, by PMD, pt took x1 dose and symptoms started. Pt denies fever/chills, CP, SOB, N/V/D or fall. Pt reports pain in right foot and ankle where he believes a spider bit him. Per family pt has dementia and occasionally hallucinates. Seen with     1.mild redness of right leg   unlikely cellulitis, ff antibiotics  skin very dry and peeling  continue to use mineral oil. No need for ABX.     2. CKD stage 3  cr around baseline, monitor bmp, IVF until creatinine levels off.     3. PVD  c/w cilostazol 100 mg bid    4. DM  monitor FS  c/w ISS    5. Hypertension  c/w losartan and Atenolol     6. BPH  -cont w/ Flomax     7. cough   - chest xray: no acute changes.    - pulmonary Follow up .  - Robitussin prn     PT  - Rehab Monday.   Preventive measure  SQ heparin

## 2020-02-03 NOTE — DIETITIAN INITIAL EVALUATION ADULT. - PERTINENT LABORATORY DATA
02-03 Na142 mmol/L Glu 103 mg/dL<H> K+ 4.1 mmol/L Cr  1.76 mg/dL<H> BUN 20 mg/dL WBC 8.81 K/uL 01-29 Alb 3.0 g/dL<L> 01-31 [id number] 8.2 %<H>

## 2020-02-03 NOTE — PROGRESS NOTE ADULT - SUBJECTIVE AND OBJECTIVE BOX
Patient is a 79y old  Male who presents with a chief complaint of Weakness and redness, pain left foot. (02 Feb 2020 19:54)      INTERVAL HPI/ OVERNIGHT EVENTS: Pt was seen and examined at bedside today, No significant overnight events, pt denies any complaints     MEDICATIONS  (STANDING):  aspirin  chewable 81 milliGRAM(s) Oral daily  ATENolol  Tablet 50 milliGRAM(s) Oral daily  cilostazol 100 milliGRAM(s) Oral two times a day  dextrose 5%. 1000 milliLiter(s) (50 mL/Hr) IV Continuous <Continuous>  dextrose 50% Injectable 12.5 Gram(s) IV Push once  dextrose 50% Injectable 25 Gram(s) IV Push once  dextrose 50% Injectable 25 Gram(s) IV Push once  donepezil 10 milliGRAM(s) Oral at bedtime  heparin  Injectable 5000 Unit(s) SubCutaneous every 12 hours  insulin lispro (HumaLOG) corrective regimen sliding scale   SubCutaneous three times a day before meals  levETIRAcetam 500 milliGRAM(s) Oral two times a day  losartan 100 milliGRAM(s) Oral daily  mineral oil for Topical Use 1 Application(s) Topical three times a day  pantoprazole    Tablet 40 milliGRAM(s) Oral before breakfast  sodium chloride 0.9%. 1000 milliLiter(s) (80 mL/Hr) IV Continuous <Continuous>  tamsulosin 0.4 milliGRAM(s) Oral at bedtime    MEDICATIONS  (PRN):  dextrose 40% Gel 15 Gram(s) Oral once PRN Blood Glucose LESS THAN 70 milliGRAM(s)/deciliter  glucagon  Injectable 1 milliGRAM(s) IntraMuscular once PRN Glucose LESS THAN 70 milligrams/deciliter  guaiFENesin   Syrup  (Sugar-Free) 200 milliGRAM(s) Oral every 6 hours PRN Cough      Allergies    Cipro (Hives)  codeine (Other)  penicillins (Unknown)    Intolerances        REVIEW OF SYSTEMS:    Unable to examine due to [ ] Encephalopathy [ ] Advanced Dementia [ ] Expressive Aphasia [ ] Non-verbal patient    CONSTITUTIONAL: No fever, NO generalized weakness/Fatigue, No weight loss  EYES: No eye pain, visual disturbances, or discharge  ENMT:  No difficulty hearing, tinnitus, vertigo; No sinus or throat pain  NECK: No pain or stiffness  RESPIRATORY: No shortness of breath,  cough, wheezing, sputum or hemoptysis   CARDIOVASCULAR: No chest pain, palpitations, or leg swelling  GASTROINTESTINAL: No abdominal pain. No nausea, vomiting, diarrhea or constipation. No melena or hematochezia.  GENITOURINARY: No dysuria, frequency, hematuria, or incontinence  NEUROLOGICAL: No headaches, Dizziness, memory loss, loss of strength, numbness, or tremors  SKIN: No itching, burning, rashes, or lesions   MUSCULOSKELETAL: No joint pain or swelling; No muscle, back, or extremity pain  PSYCHIATRIC: No depression, anxiety, mood swings, or difficulty sleeping  HEME/LYMPH: No easy bruising, or bleeding gums      Vital Signs Last 24 Hrs  T(C): 37.1 (03 Feb 2020 10:40), Max: 37.4 (02 Feb 2020 18:03)  T(F): 98.8 (03 Feb 2020 10:40), Max: 99.3 (02 Feb 2020 18:03)  HR: 85 (03 Feb 2020 10:40) (85 - 100)  BP: 154/97 (03 Feb 2020 10:40) (137/91 - 158/98)  BP(mean): --  RR: 19 (03 Feb 2020 10:40) (18 - 19)  SpO2: 93% (03 Feb 2020 10:40) (93% - 95%)    PHYSICAL EXAM:  GENERAL: NAD, obese, well-groomed  HEAD:  Atraumatic, Normocephalic  EYES: conjunctiva and sclera clear  ENMT: Moist mucous membranes  NECK: Supple, No JVD, Normal thyroid  CHEST/LUNG: Clear to Auscultation bilaterally; No rales, rhonchi, wheezing, or rubs  HEART: Regular rate and rhythm; No murmurs, rubs, or gallops  ABDOMEN: Soft, Nontender, Nondistended; Bowel sounds present  EXTREMITIES:  2+ Peripheral Pulses, No clubbing, cyanosis, or edema  SKIN: No rashes or lesions  NERVOUS SYSTEM:  Alert & Oriented X2, Good concentration; Motor Strength 5/5 B/L upper and lower extremities    LABS:                        11.0   8.81  )-----------( 243      ( 03 Feb 2020 07:52 )             31.8     02-03    142  |  111<H>  |  20  ----------------------------<  103<H>  4.1   |  19<L>  |  1.76<H>    Ca    8.8      03 Feb 2020 07:52          CAPILLARY BLOOD GLUCOSE      POCT Blood Glucose.: 100 mg/dL (03 Feb 2020 11:44)  POCT Blood Glucose.: 108 mg/dL (03 Feb 2020 07:39)  POCT Blood Glucose.: 116 mg/dL (02 Feb 2020 21:58)  POCT Blood Glucose.: 118 mg/dL (02 Feb 2020 16:47)        Culture - Urine (collected 01-30-20)  Source: .Urine Clean Catch (Midstream)  Final Report (01-31-20):    <10,000 CFU/mL Normal Urogenital Faviola    Culture - Blood (collected 01-30-20)  Source: .Blood Blood  Preliminary Report (01-31-20):    No growth to date.    Culture - Blood (collected 01-30-20)  Source: .Blood Blood  Preliminary Report (01-31-20):    No growth to date.        RADIOLOGY & ADDITIONAL TESTS:          Imaging Personally Reviewed:  [ ] YES  [ ] NO    Consultant(s) Notes Reviewed:  [ ] YES  [ ] NO    Care Discussed with Consultants/Other Providers [x ] YES  [ ] NO

## 2020-02-03 NOTE — DIETITIAN INITIAL EVALUATION ADULT. - DIET TYPE
Recommend DASH/TLC, Consistent Carbohydrate (with evening snack) + Glucerna 1x daily (220 kcals & 10 gm protein)

## 2020-02-03 NOTE — PROGRESS NOTE ADULT - SUBJECTIVE AND OBJECTIVE BOX
INTERVAL HPI:    80 y/o male w/PMH of mild dementia, HLD, HTN, DM2, Seizure disorder .  Presented to the ED today for evaluation of weakness. Pt states 'spiders bit me'. Pt now has weakness. Family in ED  stated  pt has had cough, treated empirically w/Levaquin, by PMD, pt took x1 dose and symptoms started. Denies fever/chills, CP, SOB, N/V/D or fall. Pt reports pain in right foot and ankle where he believes a spider bit him. Per family pt has dementia and occasionally hallucinates. Family  denies recent injury.   CXR with right base atelectasis/infiltrate. Denies smoking.    OVERNIGHT EVENTS:   Reported to have cough    Vital Signs Last 24 Hrs  T(C): 37.1 (03 Feb 2020 16:33), Max: 37.4 (02 Feb 2020 18:03)  T(F): 98.8 (03 Feb 2020 16:33), Max: 99.3 (02 Feb 2020 18:03)  HR: 85 (03 Feb 2020 16:33) (85 - 100)  BP: 172/97 (03 Feb 2020 16:35) (137/91 - 172/97)  BP(mean): --  RR: 18 (03 Feb 2020 16:33) (18 - 19)  SpO2: 99% (03 Feb 2020 16:33) (93% - 100%)    PHYSICAL EXAM:  GEN:         Awake, responsive and comfortable.  HEENT:    Normal.    RESP:        no wheezing.  CVS:           Regular rate and rhythm.   ABD:         Soft, non-tender, non-distended;     MEDICATIONS  (STANDING):  aspirin  chewable 81 milliGRAM(s) Oral daily  ATENolol  Tablet 50 milliGRAM(s) Oral daily  cilostazol 100 milliGRAM(s) Oral two times a day  dextrose 5%. 1000 milliLiter(s) (50 mL/Hr) IV Continuous <Continuous>  dextrose 50% Injectable 12.5 Gram(s) IV Push once  dextrose 50% Injectable 25 Gram(s) IV Push once  dextrose 50% Injectable 25 Gram(s) IV Push once  donepezil 10 milliGRAM(s) Oral at bedtime  heparin  Injectable 5000 Unit(s) SubCutaneous every 12 hours  insulin lispro (HumaLOG) corrective regimen sliding scale   SubCutaneous three times a day before meals  levETIRAcetam 500 milliGRAM(s) Oral two times a day  losartan 100 milliGRAM(s) Oral daily  mineral oil for Topical Use 1 Application(s) Topical three times a day  pantoprazole    Tablet 40 milliGRAM(s) Oral before breakfast  tamsulosin 0.4 milliGRAM(s) Oral at bedtime    MEDICATIONS  (PRN):  dextrose 40% Gel 15 Gram(s) Oral once PRN Blood Glucose LESS THAN 70 milliGRAM(s)/deciliter  glucagon  Injectable 1 milliGRAM(s) IntraMuscular once PRN Glucose LESS THAN 70 milligrams/deciliter  guaiFENesin   Syrup  (Sugar-Free) 200 milliGRAM(s) Oral every 6 hours PRN Cough    LABS:                        11.0   8.81  )-----------( 243      ( 03 Feb 2020 07:52 )             31.8     02-03    142  |  111<H>  |  20  ----------------------------<  103<H>  4.1   |  19<L>  |  1.76<H>    Ca    8.8      03 Feb 2020 07:52    ASSESSMENT AND PLAN:  ·	Right base atelectasis/effusion.  ·	Right foot cellulitis.  ·	Anemia.  ·	CKD3  ·	HTN.  ·	DM.  ·	PVD.  ·	Cough.    Add saline nasal spray.  Awaiting placement.

## 2020-02-03 NOTE — DIETITIAN INITIAL EVALUATION ADULT. - PERTINENT MEDS FT
MEDICATIONS  (STANDING):  aspirin  chewable 81 milliGRAM(s) Oral daily  ATENolol  Tablet 50 milliGRAM(s) Oral daily  cilostazol 100 milliGRAM(s) Oral two times a day  dextrose 5%. 1000 milliLiter(s) (50 mL/Hr) IV Continuous <Continuous>  dextrose 50% Injectable 12.5 Gram(s) IV Push once  dextrose 50% Injectable 25 Gram(s) IV Push once  dextrose 50% Injectable 25 Gram(s) IV Push once  donepezil 10 milliGRAM(s) Oral at bedtime  heparin  Injectable 5000 Unit(s) SubCutaneous every 12 hours  insulin lispro (HumaLOG) corrective regimen sliding scale   SubCutaneous three times a day before meals  levETIRAcetam 500 milliGRAM(s) Oral two times a day  losartan 100 milliGRAM(s) Oral daily  mineral oil for Topical Use 1 Application(s) Topical three times a day  pantoprazole    Tablet 40 milliGRAM(s) Oral before breakfast  sodium chloride 0.9%. 1000 milliLiter(s) (80 mL/Hr) IV Continuous <Continuous>  tamsulosin 0.4 milliGRAM(s) Oral at bedtime    MEDICATIONS  (PRN):  dextrose 40% Gel 15 Gram(s) Oral once PRN Blood Glucose LESS THAN 70 milliGRAM(s)/deciliter  glucagon  Injectable 1 milliGRAM(s) IntraMuscular once PRN Glucose LESS THAN 70 milligrams/deciliter  guaiFENesin   Syrup  (Sugar-Free) 200 milliGRAM(s) Oral every 6 hours PRN Cough

## 2020-02-03 NOTE — DIETITIAN INITIAL EVALUATION ADULT. - PHYSICAL APPEARANCE
BMI: 30.6 (mild obesity; class I obesity); Edema 1+ L foot & R foot; Unable to conduct full NFPE, however able to visually see temporal, orbital, buccal and clavicle regions all WDL.

## 2020-02-04 LAB
ANION GAP SERPL CALC-SCNC: 14 MMOL/L — SIGNIFICANT CHANGE UP (ref 5–17)
BUN SERPL-MCNC: 24 MG/DL — HIGH (ref 7–23)
CALCIUM SERPL-MCNC: 8.7 MG/DL — SIGNIFICANT CHANGE UP (ref 8.5–10.1)
CHLORIDE SERPL-SCNC: 113 MMOL/L — HIGH (ref 96–108)
CO2 SERPL-SCNC: 19 MMOL/L — LOW (ref 22–31)
CREAT SERPL-MCNC: 1.6 MG/DL — HIGH (ref 0.5–1.3)
CULTURE RESULTS: SIGNIFICANT CHANGE UP
CULTURE RESULTS: SIGNIFICANT CHANGE UP
GLUCOSE BLDC GLUCOMTR-MCNC: 102 MG/DL — HIGH (ref 70–99)
GLUCOSE BLDC GLUCOMTR-MCNC: 124 MG/DL — HIGH (ref 70–99)
GLUCOSE BLDC GLUCOMTR-MCNC: 169 MG/DL — HIGH (ref 70–99)
GLUCOSE SERPL-MCNC: 111 MG/DL — HIGH (ref 70–99)
POTASSIUM SERPL-MCNC: 4 MMOL/L — SIGNIFICANT CHANGE UP (ref 3.5–5.3)
POTASSIUM SERPL-SCNC: 4 MMOL/L — SIGNIFICANT CHANGE UP (ref 3.5–5.3)
SODIUM SERPL-SCNC: 146 MMOL/L — HIGH (ref 135–145)
SPECIMEN SOURCE: SIGNIFICANT CHANGE UP
SPECIMEN SOURCE: SIGNIFICANT CHANGE UP

## 2020-02-04 PROCEDURE — 99233 SBSQ HOSP IP/OBS HIGH 50: CPT

## 2020-02-04 PROCEDURE — 71045 X-RAY EXAM CHEST 1 VIEW: CPT | Mod: 26

## 2020-02-04 RX ADMIN — TAMSULOSIN HYDROCHLORIDE 0.4 MILLIGRAM(S): 0.4 CAPSULE ORAL at 21:49

## 2020-02-04 RX ADMIN — LOSARTAN POTASSIUM 100 MILLIGRAM(S): 100 TABLET, FILM COATED ORAL at 06:17

## 2020-02-04 RX ADMIN — Medication 200 MILLIGRAM(S): at 21:49

## 2020-02-04 RX ADMIN — Medication 2 SPRAY(S): at 23:50

## 2020-02-04 RX ADMIN — ATENOLOL 50 MILLIGRAM(S): 25 TABLET ORAL at 06:17

## 2020-02-04 RX ADMIN — Medication 1 APPLICATION(S): at 12:04

## 2020-02-04 RX ADMIN — Medication 200 MILLIGRAM(S): at 12:04

## 2020-02-04 RX ADMIN — PANTOPRAZOLE SODIUM 40 MILLIGRAM(S): 20 TABLET, DELAYED RELEASE ORAL at 06:17

## 2020-02-04 RX ADMIN — LEVETIRACETAM 500 MILLIGRAM(S): 250 TABLET, FILM COATED ORAL at 06:17

## 2020-02-04 RX ADMIN — DONEPEZIL HYDROCHLORIDE 10 MILLIGRAM(S): 10 TABLET, FILM COATED ORAL at 21:49

## 2020-02-04 RX ADMIN — CILOSTAZOL 100 MILLIGRAM(S): 100 TABLET ORAL at 06:17

## 2020-02-04 RX ADMIN — Medication 2 SPRAY(S): at 06:18

## 2020-02-04 RX ADMIN — Medication 200 MILLIGRAM(S): at 06:22

## 2020-02-04 RX ADMIN — LEVETIRACETAM 500 MILLIGRAM(S): 250 TABLET, FILM COATED ORAL at 17:25

## 2020-02-04 RX ADMIN — Medication 81 MILLIGRAM(S): at 12:04

## 2020-02-04 RX ADMIN — CILOSTAZOL 100 MILLIGRAM(S): 100 TABLET ORAL at 17:25

## 2020-02-04 RX ADMIN — HEPARIN SODIUM 5000 UNIT(S): 5000 INJECTION INTRAVENOUS; SUBCUTANEOUS at 17:25

## 2020-02-04 RX ADMIN — Medication 1 APPLICATION(S): at 21:49

## 2020-02-04 RX ADMIN — HEPARIN SODIUM 5000 UNIT(S): 5000 INJECTION INTRAVENOUS; SUBCUTANEOUS at 06:17

## 2020-02-04 RX ADMIN — Medication 1 APPLICATION(S): at 06:18

## 2020-02-04 NOTE — PROGRESS NOTE ADULT - SUBJECTIVE AND OBJECTIVE BOX
Patient is a 79y old  Male who presents with a chief complaint of Weakness and redness, pain left foot. (03 Feb 2020 17:41)      INTERVAL HPI/ OVERNIGHT EVENTS: Pt was seen and examined at bedside today, The patient had episode of SOB overnight; he was placed on Nasal canula and IVF was d/gordon. Pt now denies any CP, SOB, cough or wheezing.      MEDICATIONS  (STANDING):  aspirin  chewable 81 milliGRAM(s) Oral daily  ATENolol  Tablet 50 milliGRAM(s) Oral daily  cilostazol 100 milliGRAM(s) Oral two times a day  dextrose 5%. 1000 milliLiter(s) (50 mL/Hr) IV Continuous <Continuous>  dextrose 50% Injectable 12.5 Gram(s) IV Push once  dextrose 50% Injectable 25 Gram(s) IV Push once  dextrose 50% Injectable 25 Gram(s) IV Push once  donepezil 10 milliGRAM(s) Oral at bedtime  heparin  Injectable 5000 Unit(s) SubCutaneous every 12 hours  insulin lispro (HumaLOG) corrective regimen sliding scale   SubCutaneous three times a day before meals  levETIRAcetam 500 milliGRAM(s) Oral two times a day  losartan 100 milliGRAM(s) Oral daily  mineral oil for Topical Use 1 Application(s) Topical three times a day  pantoprazole    Tablet 40 milliGRAM(s) Oral before breakfast  sodium chloride 0.65% Nasal 2 Spray(s) Both Nostrils every 6 hours  tamsulosin 0.4 milliGRAM(s) Oral at bedtime    MEDICATIONS  (PRN):  dextrose 40% Gel 15 Gram(s) Oral once PRN Blood Glucose LESS THAN 70 milliGRAM(s)/deciliter  glucagon  Injectable 1 milliGRAM(s) IntraMuscular once PRN Glucose LESS THAN 70 milligrams/deciliter  guaiFENesin   Syrup  (Sugar-Free) 200 milliGRAM(s) Oral every 6 hours PRN Cough      Allergies    Cipro (Hives)  codeine (Other)  penicillins (Unknown)    Intolerances        REVIEW OF SYSTEMS:    Unable to examine due to [ ] Encephalopathy [ ] Advanced Dementia [ ] Expressive Aphasia [ ] Non-verbal patient    CONSTITUTIONAL: No fever, NO generalized weakness/Fatigue, No weight loss  EYES: No eye pain, visual disturbances, or discharge  ENMT:  No difficulty hearing, tinnitus, vertigo; No sinus or throat pain  NECK: No pain or stiffness  RESPIRATORY: No shortness of breath,  cough, wheezing, sputum or hemoptysis   CARDIOVASCULAR: No chest pain, palpitations, or leg swelling  GASTROINTESTINAL: No abdominal pain. No nausea, vomiting, diarrhea or constipation. No melena or hematochezia.  GENITOURINARY: No dysuria, frequency, hematuria, or incontinence  NEUROLOGICAL: No headaches, Dizziness, memory loss, loss of strength, numbness, or tremors  SKIN: No itching, burning, rashes, or lesions   MUSCULOSKELETAL: No joint pain or swelling; No muscle, back, or extremity pain  PSYCHIATRIC: No depression, anxiety, mood swings, or difficulty sleeping  HEME/LYMPH: No easy bruising, or bleeding gums      Vital Signs Last 24 Hrs  T(C): 37.2 (04 Feb 2020 16:34), Max: 37.3 (04 Feb 2020 10:32)  T(F): 99 (04 Feb 2020 16:34), Max: 99.1 (04 Feb 2020 10:32)  HR: 85 (04 Feb 2020 16:34) (75 - 88)  BP: 136/77 (04 Feb 2020 16:34) (136/77 - 167/107)  BP(mean): --  RR: 18 (04 Feb 2020 16:34) (16 - 18)  SpO2: 97% (04 Feb 2020 16:34) (93% - 97%)    PHYSICAL EXAM:  GENERAL: NAD, obese, well-groomed  HEAD:  Atraumatic, Normocephalic  EYES: conjunctiva and sclera clear  ENMT: Moist mucous membranes  NECK: Supple, No JVD, Normal thyroid  CHEST/LUNG: Clear to Auscultation bilaterally; No rales, rhonchi, wheezing, or rubs  HEART: Regular rate and rhythm; No murmurs, rubs, or gallops  ABDOMEN: Soft, Nontender, Nondistended; Bowel sounds present  EXTREMITIES:  2+ Peripheral Pulses, No clubbing, cyanosis, or edema  SKIN: No rashes or lesions  NERVOUS SYSTEM:  Alert & Oriented X2, Good concentration; Motor Strength 5/5 B/L upper and lower extremities      LABS:                        11.0   8.81  )-----------( 243      ( 03 Feb 2020 07:52 )             31.8     02-04    146<H>  |  113<H>  |  24<H>  ----------------------------<  111<H>  4.0   |  19<L>  |  1.60<H>    Ca    8.7      04 Feb 2020 07:37          CAPILLARY BLOOD GLUCOSE      POCT Blood Glucose.: 124 mg/dL (04 Feb 2020 11:04)  POCT Blood Glucose.: 102 mg/dL (04 Feb 2020 07:46)  POCT Blood Glucose.: 111 mg/dL (03 Feb 2020 22:16)        Culture - Urine (collected 01-30-20)  Source: .Urine Clean Catch (Midstream)  Final Report (01-31-20):    <10,000 CFU/mL Normal Urogenital Faviola    Culture - Blood (collected 01-30-20)  Source: .Blood Blood  Final Report (02-04-20):    No growth at 5 days.    Culture - Blood (collected 01-30-20)  Source: .Blood Blood  Final Report (02-04-20):    No growth at 5 days.        RADIOLOGY & ADDITIONAL TESTS:          Imaging Personally Reviewed:  [ ] YES  [ ] NO    Consultant(s) Notes Reviewed:  [ ] YES  [ ] NO    Care Discussed with Consultants/Other Providers [x ] YES  [ ] NO

## 2020-02-04 NOTE — PROGRESS NOTE ADULT - ASSESSMENT
80 y/o male w/PMH of mild dementia, HLD, HTN, DM2, seizure disorder presents to the ED today for evaluation of weakness. Pt states 'spiders bit me'. Pt now has weakness. Family at bedside states pt has had cough, treated empirically w/Levaquin, by PMD, pt took x1 dose and symptoms started. Pt denies fever/chills, CP, SOB, N/V/D or fall. Pt reports pain in right foot and ankle where he believes a spider bit him. Per family pt has dementia and occasionally hallucinates. Seen with     Weakness   LATISHA on discharged.     Respiratory distress   - chest xray: Cardiomegaly no acute changes.    - pulmonary Follow up .  - will check Echo to r/o CHF     CKD stage 3  cr around baseline, monitor bmp, IVF until creatinine levels off.     PVD  c/w cilostazol 100 mg bid    DM  monitor FS  c/w ISS    Hypertension  c/w losartan and Atenolol     BPH  -cont w/ Flomax     Dementia:   -frequent Hallucinations (spiders)  - cont Aricept   -continue supportive care.     Seizure:   -cont w/ Keppra     Preventive measure  SQ heparin  Disposition: [facility]

## 2020-02-05 LAB — GLUCOSE BLDC GLUCOMTR-MCNC: 111 MG/DL — HIGH (ref 70–99)

## 2020-02-05 PROCEDURE — 93306 TTE W/DOPPLER COMPLETE: CPT | Mod: 26

## 2020-02-05 PROCEDURE — 99233 SBSQ HOSP IP/OBS HIGH 50: CPT

## 2020-02-05 RX ADMIN — LEVETIRACETAM 500 MILLIGRAM(S): 250 TABLET, FILM COATED ORAL at 17:17

## 2020-02-05 RX ADMIN — Medication 1 APPLICATION(S): at 21:16

## 2020-02-05 RX ADMIN — PANTOPRAZOLE SODIUM 40 MILLIGRAM(S): 20 TABLET, DELAYED RELEASE ORAL at 08:45

## 2020-02-05 RX ADMIN — Medication 200 MILLIGRAM(S): at 05:31

## 2020-02-05 RX ADMIN — CILOSTAZOL 100 MILLIGRAM(S): 100 TABLET ORAL at 17:17

## 2020-02-05 RX ADMIN — Medication 2 SPRAY(S): at 17:17

## 2020-02-05 RX ADMIN — TAMSULOSIN HYDROCHLORIDE 0.4 MILLIGRAM(S): 0.4 CAPSULE ORAL at 21:11

## 2020-02-05 RX ADMIN — DONEPEZIL HYDROCHLORIDE 10 MILLIGRAM(S): 10 TABLET, FILM COATED ORAL at 21:11

## 2020-02-05 RX ADMIN — HEPARIN SODIUM 5000 UNIT(S): 5000 INJECTION INTRAVENOUS; SUBCUTANEOUS at 17:16

## 2020-02-05 RX ADMIN — Medication 1 APPLICATION(S): at 05:32

## 2020-02-05 RX ADMIN — HEPARIN SODIUM 5000 UNIT(S): 5000 INJECTION INTRAVENOUS; SUBCUTANEOUS at 05:31

## 2020-02-05 RX ADMIN — Medication 2 SPRAY(S): at 23:11

## 2020-02-05 RX ADMIN — LOSARTAN POTASSIUM 100 MILLIGRAM(S): 100 TABLET, FILM COATED ORAL at 05:31

## 2020-02-05 RX ADMIN — LEVETIRACETAM 500 MILLIGRAM(S): 250 TABLET, FILM COATED ORAL at 05:31

## 2020-02-05 RX ADMIN — Medication 1 APPLICATION(S): at 13:46

## 2020-02-05 RX ADMIN — Medication 81 MILLIGRAM(S): at 12:01

## 2020-02-05 RX ADMIN — CILOSTAZOL 100 MILLIGRAM(S): 100 TABLET ORAL at 05:32

## 2020-02-05 RX ADMIN — Medication 2 SPRAY(S): at 05:31

## 2020-02-05 RX ADMIN — Medication 2 SPRAY(S): at 12:01

## 2020-02-05 RX ADMIN — ATENOLOL 50 MILLIGRAM(S): 25 TABLET ORAL at 05:32

## 2020-02-05 NOTE — PROGRESS NOTE ADULT - ASSESSMENT
78 y/o male w/PMH of mild dementia, HLD, HTN, DM2, CKD III, PVD, BPH, GERD, PVD & seizure disorder presented w/ generalized weakness.    Weakness   - LATISHA on discharge     Respiratory distress   - chest x-ray showed cardiomegaly w/ no acute changes  - pulmonary note read and appreciated   - Echo showed     BELIA  - resolved     PVD  - c/w cilostazol  & ASA    DM II  - c/w ISS  - Hgb A1C is 8.2    Hypertension  - c/w losartan and Atenolol     BPH  - c/w Flomax     Dementia  - patient has frequent Hallucinations (spiders)  - c/w Aricept   - c/w supportive care    Seizure   - c/w Keppra     Prophylaxis:   DVT: Heparin  GI: Protonix  Disposition: [facility]

## 2020-02-05 NOTE — PROGRESS NOTE ADULT - SUBJECTIVE AND OBJECTIVE BOX
80 y/o male w/PMH of mild dementia, HLD, HTN, DM2, CKD III, PVD, BPH, GERD, PVD & seizure disorder presented w/ generalized weakness. He is lying in bed in NAD.    MEDICATIONS  (STANDING):  aspirin  chewable 81 milliGRAM(s) Oral daily  ATENolol  Tablet 50 milliGRAM(s) Oral daily  cilostazol 100 milliGRAM(s) Oral two times a day  dextrose 5%. 1000 milliLiter(s) (50 mL/Hr) IV Continuous <Continuous>  dextrose 50% Injectable 12.5 Gram(s) IV Push once  dextrose 50% Injectable 25 Gram(s) IV Push once  dextrose 50% Injectable 25 Gram(s) IV Push once  donepezil 10 milliGRAM(s) Oral at bedtime  heparin  Injectable 5000 Unit(s) SubCutaneous every 12 hours  insulin lispro (HumaLOG) corrective regimen sliding scale   SubCutaneous three times a day before meals  levETIRAcetam 500 milliGRAM(s) Oral two times a day  losartan 100 milliGRAM(s) Oral daily  mineral oil for Topical Use 1 Application(s) Topical three times a day  pantoprazole    Tablet 40 milliGRAM(s) Oral before breakfast  sodium chloride 0.65% Nasal 2 Spray(s) Both Nostrils every 6 hours  tamsulosin 0.4 milliGRAM(s) Oral at bedtime    MEDICATIONS  (PRN):  dextrose 40% Gel 15 Gram(s) Oral once PRN Blood Glucose LESS THAN 70 milliGRAM(s)/deciliter  glucagon  Injectable 1 milliGRAM(s) IntraMuscular once PRN Glucose LESS THAN 70 milligrams/deciliter  guaiFENesin   Syrup  (Sugar-Free) 200 milliGRAM(s) Oral every 6 hours PRN Cough      Allergies    Cipro (Hives)  codeine (Other)  penicillins (Unknown)    Intolerances        Vital Signs Last 24 Hrs  T(C): 36.8 (05 Feb 2020 16:20), Max: 36.9 (04 Feb 2020 23:52)  T(F): 98.3 (05 Feb 2020 16:20), Max: 98.5 (04 Feb 2020 23:52)  HR: 82 (05 Feb 2020 16:20) (82 - 94)  BP: 145/73 (05 Feb 2020 16:20) (125/60 - 145/73)  BP(mean): --  RR: 18 (05 Feb 2020 16:20) (16 - 18)  SpO2: 96% (05 Feb 2020 16:20) (96% - 100%)    PHYSICAL EXAM:  GENERAL: NAD, well-groomed, well-developed  HEAD:  Atraumatic, Normocephalic  EYES: EOMI, PERRLA   NECK: Supple   NERVOUS SYSTEM:  Alert & Oriented X3, Good concentration   CHEST/LUNG: Clear to auscultation bilaterally; No rales, rhonchi, wheezing, or rubs  HEART: Regular rate and rhythm; No murmurs, rubs, or gallops  ABDOMEN: Soft, Nontender, Nondistended; Bowel sounds present  EXTREMITIES:  No clubbing, cyanosis, or edema       LABS:    02-04    146<H>  |  113<H>  |  24<H>  ----------------------------<  111<H>  4.0   |  19<L>  |  1.60<H>    Ca    8.7      04 Feb 2020 07:37          CAPILLARY BLOOD GLUCOSE      POCT Blood Glucose.: 94 mg/dL (05 Feb 2020 21:01)  POCT Blood Glucose.: 129 mg/dL (05 Feb 2020 16:22)  POCT Blood Glucose.: 143 mg/dL (05 Feb 2020 11:24)  POCT Blood Glucose.: 111 mg/dL (05 Feb 2020 07:34)      RADIOLOGY & ADDITIONAL TESTS:    02-04-20 @ 07:01  -  02-05-20 @ 07:00  --------------------------------------------------------  IN:    Oral Fluid: 604 mL  Total IN: 604 mL    OUT:  Total OUT: 0 mL    Total NET: 604 mL      02-05-20 @ 07:01  -  02-05-20 @ 22:57  --------------------------------------------------------  IN:    Oral Fluid: 480 mL  Total IN: 480 mL    OUT:  Total OUT: 0 mL    Total NET: 480 mL

## 2020-02-05 NOTE — PROGRESS NOTE ADULT - SUBJECTIVE AND OBJECTIVE BOX
INTERVAL HPI:  78 y/o male w/PMH of mild dementia, HLD, HTN, DM2, Seizure disorder .  Presented to the ED today for evaluation of weakness. Pt states 'spiders bit me'. Pt now has weakness. Family in ED  stated  pt has had cough, treated empirically w/Levaquin, by PMD, pt took x1 dose and symptoms started. Denies fever/chills, CP, SOB, N/V/D or fall. Pt reports pain in right foot and ankle where he believes a spider bit him. Per family pt has dementia and occasionally hallucinates. Family  denies recent injury.   CXR with right base atelectasis/infiltrate. Denies smoking.    OVERNIGHT EVENTS:  Awake responsive. Reports cough    Vital Signs Last 24 Hrs  T(C): 36.8 (05 Feb 2020 16:20), Max: 36.9 (04 Feb 2020 23:52)  T(F): 98.3 (05 Feb 2020 16:20), Max: 98.5 (04 Feb 2020 23:52)  HR: 82 (05 Feb 2020 16:20) (82 - 94)  BP: 145/73 (05 Feb 2020 16:20) (125/60 - 145/73)  BP(mean): --  RR: 18 (05 Feb 2020 16:20) (16 - 18)  SpO2: 96% (05 Feb 2020 16:20) (96% - 100%)    PHYSICAL EXAM:  GEN:         Awake, responsive and comfortable.  HEENT:    Normal.    RESP:        no wheezing.  CVS:          Regular rate and rhythm.   ABD:         Soft, non-tender, non-distended;     MEDICATIONS  (STANDING):  aspirin  chewable 81 milliGRAM(s) Oral daily  ATENolol  Tablet 50 milliGRAM(s) Oral daily  cilostazol 100 milliGRAM(s) Oral two times a day  dextrose 5%. 1000 milliLiter(s) (50 mL/Hr) IV Continuous <Continuous>  dextrose 50% Injectable 12.5 Gram(s) IV Push once  dextrose 50% Injectable 25 Gram(s) IV Push once  dextrose 50% Injectable 25 Gram(s) IV Push once  donepezil 10 milliGRAM(s) Oral at bedtime  heparin  Injectable 5000 Unit(s) SubCutaneous every 12 hours  insulin lispro (HumaLOG) corrective regimen sliding scale   SubCutaneous three times a day before meals  levETIRAcetam 500 milliGRAM(s) Oral two times a day  losartan 100 milliGRAM(s) Oral daily  mineral oil for Topical Use 1 Application(s) Topical three times a day  pantoprazole    Tablet 40 milliGRAM(s) Oral before breakfast  sodium chloride 0.65% Nasal 2 Spray(s) Both Nostrils every 6 hours  tamsulosin 0.4 milliGRAM(s) Oral at bedtime    MEDICATIONS  (PRN):  dextrose 40% Gel 15 Gram(s) Oral once PRN Blood Glucose LESS THAN 70 milliGRAM(s)/deciliter  glucagon  Injectable 1 milliGRAM(s) IntraMuscular once PRN Glucose LESS THAN 70 milligrams/deciliter  guaiFENesin   Syrup  (Sugar-Free) 200 milliGRAM(s) Oral every 6 hours PRN Cough    LABS:    02-04    146<H>  |  113<H>  |  24<H>  ----------------------------<  111<H>  4.0   |  19<L>  |  1.60<H>    Ca    8.7      04 Feb 2020 07:37    ASSESSMENT AND PLAN:  ·	Right base atelectasis/effusion.  ·	Right foot cellulitis.  ·	Anemia.  ·	CKD3  ·	HTN.  ·	DM.  ·	PVD.  ·	Cough.    On saline nasal spray, protonix and Robitussin.

## 2020-02-06 ENCOUNTER — TRANSCRIPTION ENCOUNTER (OUTPATIENT)
Age: 80
End: 2020-02-06

## 2020-02-06 DIAGNOSIS — F03.90 UNSPECIFIED DEMENTIA WITHOUT BEHAVIORAL DISTURBANCE: ICD-10-CM

## 2020-02-06 DIAGNOSIS — R41.0 DISORIENTATION, UNSPECIFIED: ICD-10-CM

## 2020-02-06 LAB
ANION GAP SERPL CALC-SCNC: 10 MMOL/L — SIGNIFICANT CHANGE UP (ref 5–17)
BUN SERPL-MCNC: 16 MG/DL — SIGNIFICANT CHANGE UP (ref 7–23)
CALCIUM SERPL-MCNC: 9.2 MG/DL — SIGNIFICANT CHANGE UP (ref 8.5–10.1)
CHLORIDE SERPL-SCNC: 109 MMOL/L — HIGH (ref 96–108)
CO2 SERPL-SCNC: 25 MMOL/L — SIGNIFICANT CHANGE UP (ref 22–31)
CREAT SERPL-MCNC: 1.4 MG/DL — HIGH (ref 0.5–1.3)
GLUCOSE SERPL-MCNC: 110 MG/DL — HIGH (ref 70–99)
HCT VFR BLD CALC: 33.8 % — LOW (ref 39–50)
HGB BLD-MCNC: 11.6 G/DL — LOW (ref 13–17)
MAGNESIUM SERPL-MCNC: 2 MG/DL — SIGNIFICANT CHANGE UP (ref 1.6–2.6)
MCHC RBC-ENTMCNC: 29.9 PG — SIGNIFICANT CHANGE UP (ref 27–34)
MCHC RBC-ENTMCNC: 34.3 GM/DL — SIGNIFICANT CHANGE UP (ref 32–36)
MCV RBC AUTO: 87.1 FL — SIGNIFICANT CHANGE UP (ref 80–100)
NRBC # BLD: 0 /100 WBCS — SIGNIFICANT CHANGE UP (ref 0–0)
PHOSPHATE SERPL-MCNC: 2.6 MG/DL — SIGNIFICANT CHANGE UP (ref 2.5–4.5)
PLATELET # BLD AUTO: 275 K/UL — SIGNIFICANT CHANGE UP (ref 150–400)
POTASSIUM SERPL-MCNC: 4.5 MMOL/L — SIGNIFICANT CHANGE UP (ref 3.5–5.3)
POTASSIUM SERPL-SCNC: 4.5 MMOL/L — SIGNIFICANT CHANGE UP (ref 3.5–5.3)
RBC # BLD: 3.88 M/UL — LOW (ref 4.2–5.8)
RBC # FLD: 13.7 % — SIGNIFICANT CHANGE UP (ref 10.3–14.5)
SODIUM SERPL-SCNC: 144 MMOL/L — SIGNIFICANT CHANGE UP (ref 135–145)
WBC # BLD: 8.25 K/UL — SIGNIFICANT CHANGE UP (ref 3.8–10.5)
WBC # FLD AUTO: 8.25 K/UL — SIGNIFICANT CHANGE UP (ref 3.8–10.5)

## 2020-02-06 PROCEDURE — 90792 PSYCH DIAG EVAL W/MED SRVCS: CPT

## 2020-02-06 PROCEDURE — 99233 SBSQ HOSP IP/OBS HIGH 50: CPT

## 2020-02-06 RX ORDER — INSULIN GLARGINE 100 [IU]/ML
15 INJECTION, SOLUTION SUBCUTANEOUS
Qty: 0 | Refills: 0 | DISCHARGE

## 2020-02-06 RX ORDER — DOXAZOSIN MESYLATE 4 MG
1 TABLET ORAL
Qty: 0 | Refills: 0 | DISCHARGE

## 2020-02-06 RX ORDER — MINERAL OIL
1 OIL (ML) MISCELLANEOUS
Qty: 0 | Refills: 0 | DISCHARGE
Start: 2020-02-06

## 2020-02-06 RX ORDER — HYDRALAZINE HCL 50 MG
25 TABLET ORAL ONCE
Refills: 0 | Status: COMPLETED | OUTPATIENT
Start: 2020-02-06 | End: 2020-02-06

## 2020-02-06 RX ORDER — INSULIN GLARGINE 100 [IU]/ML
5 INJECTION, SOLUTION SUBCUTANEOUS
Qty: 0 | Refills: 0 | DISCHARGE

## 2020-02-06 RX ORDER — ATENOLOL 25 MG/1
100 TABLET ORAL DAILY
Refills: 0 | Status: DISCONTINUED | OUTPATIENT
Start: 2020-02-06 | End: 2020-02-07

## 2020-02-06 RX ORDER — OXYBUTYNIN CHLORIDE 5 MG
1 TABLET ORAL
Qty: 0 | Refills: 0 | DISCHARGE

## 2020-02-06 RX ORDER — FUROSEMIDE 40 MG
1 TABLET ORAL
Qty: 0 | Refills: 0 | DISCHARGE

## 2020-02-06 RX ADMIN — Medication 2 SPRAY(S): at 11:33

## 2020-02-06 RX ADMIN — Medication 2 SPRAY(S): at 23:00

## 2020-02-06 RX ADMIN — Medication 2 SPRAY(S): at 17:22

## 2020-02-06 RX ADMIN — Medication 200 MILLIGRAM(S): at 00:05

## 2020-02-06 RX ADMIN — HEPARIN SODIUM 5000 UNIT(S): 5000 INJECTION INTRAVENOUS; SUBCUTANEOUS at 06:12

## 2020-02-06 RX ADMIN — Medication 0.1 MILLIGRAM(S): at 19:08

## 2020-02-06 RX ADMIN — Medication 81 MILLIGRAM(S): at 11:33

## 2020-02-06 RX ADMIN — ATENOLOL 50 MILLIGRAM(S): 25 TABLET ORAL at 06:12

## 2020-02-06 RX ADMIN — PANTOPRAZOLE SODIUM 40 MILLIGRAM(S): 20 TABLET, DELAYED RELEASE ORAL at 06:12

## 2020-02-06 RX ADMIN — Medication 25 MILLIGRAM(S): at 19:47

## 2020-02-06 RX ADMIN — Medication 1 APPLICATION(S): at 13:42

## 2020-02-06 RX ADMIN — Medication 200 MILLIGRAM(S): at 08:05

## 2020-02-06 RX ADMIN — LEVETIRACETAM 500 MILLIGRAM(S): 250 TABLET, FILM COATED ORAL at 06:12

## 2020-02-06 RX ADMIN — DONEPEZIL HYDROCHLORIDE 10 MILLIGRAM(S): 10 TABLET, FILM COATED ORAL at 21:01

## 2020-02-06 RX ADMIN — LEVETIRACETAM 500 MILLIGRAM(S): 250 TABLET, FILM COATED ORAL at 17:22

## 2020-02-06 RX ADMIN — Medication 1 APPLICATION(S): at 06:12

## 2020-02-06 RX ADMIN — CILOSTAZOL 100 MILLIGRAM(S): 100 TABLET ORAL at 17:24

## 2020-02-06 RX ADMIN — CILOSTAZOL 100 MILLIGRAM(S): 100 TABLET ORAL at 06:12

## 2020-02-06 RX ADMIN — Medication 2 SPRAY(S): at 06:12

## 2020-02-06 RX ADMIN — HEPARIN SODIUM 5000 UNIT(S): 5000 INJECTION INTRAVENOUS; SUBCUTANEOUS at 17:24

## 2020-02-06 RX ADMIN — Medication 1 APPLICATION(S): at 21:05

## 2020-02-06 RX ADMIN — LOSARTAN POTASSIUM 100 MILLIGRAM(S): 100 TABLET, FILM COATED ORAL at 06:12

## 2020-02-06 RX ADMIN — TAMSULOSIN HYDROCHLORIDE 0.4 MILLIGRAM(S): 0.4 CAPSULE ORAL at 21:01

## 2020-02-06 NOTE — DISCHARGE NOTE PROVIDER - NSDCCPCAREPLAN_GEN_ALL_CORE_FT
PRINCIPAL DISCHARGE DIAGNOSIS  Diagnosis: Cellulitis  Assessment and Plan of Treatment:       SECONDARY DISCHARGE DIAGNOSES  Diagnosis: Acute on chronic renal insufficiency  Assessment and Plan of Treatment:     Diagnosis: Weakness  Assessment and Plan of Treatment:

## 2020-02-06 NOTE — DISCHARGE NOTE PROVIDER - HOSPITAL COURSE
78 y/o male w/PMH of mild dementia, HLD, HTN, DM2, CKD III, PVD, BPH, GERD, PVD & seizure disorder presented w/ generalized weakness, dyspnea, BELIA on CKD III and RLE pain & redness. He was seen by ID who felt that the patient did not have cellulitis. Chest x-ray showed cardiomegaly w/ no acute changes and patient was cleared for discharge by pulmonary. Her Echo showed EF 55 to 60% w/ Grade I diastolic dysfunction.  Her BELIA resolved. Of note, patient reported having hallucinations of spiders and thought one had bit him on the right leg. He was seen and cleared for discharge by psych.         Discharge time: 43 minutes

## 2020-02-06 NOTE — BEHAVIORAL HEALTH ASSESSMENT NOTE - HPI (INCLUDE ILLNESS QUALITY, SEVERITY, DURATION, TIMING, CONTEXT, MODIFYING FACTORS, ASSOCIATED SIGNS AND SYMPTOMS)
80 yo male with charted hx of Dementia (on Aricept 10mg PO qhs ) as well as Keppra 500 mg PO bid (for onset of seizures in 2019). Of note, Pt stated in the ED that  'spiders bit me' & now has weakness, is unable to ambulate. Family at bedsides stats pt has had cough few days prior, treated empirically w/Levaquin, pt took x1 dose and symptoms started.    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 Of note, Pt stated in the ED that  'spiders bit me' & now has weakness, is unable to ambulate. Family at bedsides stats pt has had cough few days prior, treated empirically w/Levaquin, pt took x1 dose and symptoms started.    EXAM: Patient is a limited historian. Patient is calm, cooperative, pleasantly disoriented and focuses on having to wipe himself with a napkin after he urinates. Also said using the bathroom is hard for him but could not explain what he meant by that. He does not recall anything about spiders. He asked when he could go home. Not oriented to time or situation which is expected. He denies any symptoms of hypomania/cassi/psychosis/major depression/ anxiety/panic. Denies any active or passive suicidal or homicidal ideation. Names protective factors (micah; family; hope for future).     CVM: no record of patient  ISTOP Reference #: [id number] no record of Patient   COLLATERAL FROM NURSING STAFF: patient has not been aggressive or agitated. He has sporadic episodes of talking about spiders/having been bitten one (also mentioned in ED note) and also about Black Magic. Patient does not act on this meaning he does not become scared, he lyle snot try to "squash" the spiders etc. He has remained medication  compliant.

## 2020-02-06 NOTE — DISCHARGE NOTE PROVIDER - NSDCMRMEDTOKEN_GEN_ALL_CORE_FT
aspirin 81 mg oral tablet, chewable: 1 tab(s) orally once a day  atenolol 50 mg oral tablet: 1 tab(s) orally once a day  Basaglar KwikPen 100 units/mL subcutaneous solution: 15 unit(s) subcutaneous 2 times a day  cilostazol 100 mg oral tablet: 1 tab(s) orally 2 times a day  donepezil 10 mg oral tablet: 1 tab(s) orally once a day (at bedtime)  doxazosin 2 mg oral tablet: 1 tab(s) orally once a day  Lasix 40 mg oral tablet: 1 tab(s) orally 2 times a day  levETIRAcetam 500 mg oral tablet: 1 tab(s) orally 2 times a day  losartan 100 mg oral tablet: 1 tab(s) orally once a day  oxybutynin 10 mg/24 hr oral tablet, extended release: 1 tab(s) orally once a day  tamsulosin 0.4 mg oral capsule: 1 cap(s) orally once a day (at bedtime)

## 2020-02-06 NOTE — BEHAVIORAL HEALTH ASSESSMENT NOTE - NSBHCHARTREVIEWLAB_PSY_A_CORE FT
02-06    144  |  109<H>  |  16  ----------------------------<  110<H>  4.5   |  25  |  1.40<H>    Ca    9.2      06 Feb 2020 08:03  Phos  2.6     02-06  Mg     2.0     02-06

## 2020-02-06 NOTE — BEHAVIORAL HEALTH ASSESSMENT NOTE - VIOLENCE PROTECTIVE FACTORS:
Relationship stability/Sobriety/Good treatment response/compliance/Residential stability/Engagement in treatment

## 2020-02-06 NOTE — PROGRESS NOTE ADULT - ASSESSMENT
80 y/o male w/PMH of mild dementia, HLD, HTN, DM2, CKD III, PVD, BPH, GERD, PVD & seizure disorder presented w/ generalized weakness.    Weakness   - LATISHA on discharge     Respiratory distress   - chest x-ray showed cardiomegaly w/ no acute changes  - pulmonary note read and appreciated   - Echo showed     BELIA  - resolved     PVD  - c/w cilostazol  & ASA    DM II  - c/w ISS  - Hgb A1C is 8.2    Hypertension  - c/w losartan and increase Atenolol     BPH  - c/w Flomax     Dementia  - patient has frequent Hallucinations (spiders)  - c/w Aricept   - c/w supportive care  - psych note read and appreciated     Seizure   - c/w Keppra     Prophylaxis:   DVT: Heparin  GI: Protonix  Disposition: LATISHA once BP is better.

## 2020-02-06 NOTE — BEHAVIORAL HEALTH ASSESSMENT NOTE - NSBHCONSULTFOLLOWAFTERCARE_PSY_A_CORE FT
does not need outpt psychiatric services at this time. His Dementia symptoms can be managed by his Neurologist

## 2020-02-06 NOTE — PROGRESS NOTE ADULT - SUBJECTIVE AND OBJECTIVE BOX
80 y/o male w/PMH of mild dementia, HLD, HTN, DM2, CKD III, PVD, BPH, GERD, PVD & seizure disorder presented w/ generalized weakness. He is lying in bed in NAD.     MEDICATIONS  (STANDING):  aspirin  chewable 81 milliGRAM(s) Oral daily  ATENolol  Tablet 50 milliGRAM(s) Oral daily  cilostazol 100 milliGRAM(s) Oral two times a day  dextrose 5%. 1000 milliLiter(s) (50 mL/Hr) IV Continuous <Continuous>  dextrose 50% Injectable 12.5 Gram(s) IV Push once  dextrose 50% Injectable 25 Gram(s) IV Push once  dextrose 50% Injectable 25 Gram(s) IV Push once  donepezil 10 milliGRAM(s) Oral at bedtime  heparin  Injectable 5000 Unit(s) SubCutaneous every 12 hours  insulin lispro (HumaLOG) corrective regimen sliding scale   SubCutaneous three times a day before meals  levETIRAcetam 500 milliGRAM(s) Oral two times a day  losartan 100 milliGRAM(s) Oral daily  mineral oil for Topical Use 1 Application(s) Topical three times a day  pantoprazole    Tablet 40 milliGRAM(s) Oral before breakfast  sodium chloride 0.65% Nasal 2 Spray(s) Both Nostrils every 6 hours  tamsulosin 0.4 milliGRAM(s) Oral at bedtime    MEDICATIONS  (PRN):  dextrose 40% Gel 15 Gram(s) Oral once PRN Blood Glucose LESS THAN 70 milliGRAM(s)/deciliter  glucagon  Injectable 1 milliGRAM(s) IntraMuscular once PRN Glucose LESS THAN 70 milligrams/deciliter  guaiFENesin   Syrup  (Sugar-Free) 200 milliGRAM(s) Oral every 6 hours PRN Cough      Allergies    Cipro (Hives)  codeine (Other)  penicillins (Unknown)    Intolerances     Vital Signs Last 24 Hrs  T(C): 36.4 (06 Feb 2020 18:15), Max: 37.1 (06 Feb 2020 05:22)  T(F): 97.6 (06 Feb 2020 18:15), Max: 98.7 (06 Feb 2020 05:22)  HR: 91 (06 Feb 2020 20:46) (77 - 97)  BP: 171/96 (06 Feb 2020 20:46) (148/78 - 182/108)  BP(mean): --  RR: 17 (06 Feb 2020 18:15) (16 - 18)  SpO2: 97% (06 Feb 2020 18:15) (93% - 97%)    PHYSICAL EXAM:  GENERAL: NAD, well-groomed, well-developed  HEAD:  Atraumatic, Normocephalic  EYES: EOMI, PERRLA   NECK: Supple   NERVOUS SYSTEM:  Alert & Oriented X3, Good concentration   CHEST/LUNG: Clear to auscultation bilaterally; No rales, rhonchi, wheezing, or rubs  HEART: Regular rate and rhythm; No murmurs, rubs, or gallops  ABDOMEN: Soft, Nontender, Nondistended; Bowel sounds present  EXTREMITIES:  No clubbing, cyanosis, or edema    LABS:                        11.6   8.25  )-----------( 275      ( 06 Feb 2020 08:03 )             33.8     02-06    144  |  109<H>  |  16  ----------------------------<  110<H>  4.5   |  25  |  1.40<H>    Ca    9.2      06 Feb 2020 08:03  Phos  2.6     02-06  Mg     2.0     02-06          CAPILLARY BLOOD GLUCOSE      POCT Blood Glucose.: 134 mg/dL (06 Feb 2020 20:54)  POCT Blood Glucose.: 118 mg/dL (06 Feb 2020 16:39)  POCT Blood Glucose.: 99 mg/dL (06 Feb 2020 10:59)  POCT Blood Glucose.: 109 mg/dL (06 Feb 2020 07:45)      RADIOLOGY & ADDITIONAL TESTS:    02-05-20 @ 07:01  -  02-06-20 @ 07:00  --------------------------------------------------------  IN:    Oral Fluid: 780 mL  Total IN: 780 mL    OUT:  Total OUT: 0 mL    Total NET: 780 mL      02-06-20 @ 07:01  -  02-06-20 @ 21:36  --------------------------------------------------------  IN:    Oral Fluid: 120 mL  Total IN: 120 mL    OUT:  Total OUT: 0 mL    Total NET: 120 mL

## 2020-02-06 NOTE — BEHAVIORAL HEALTH ASSESSMENT NOTE - RISK ASSESSMENT
Low Acute Suicide Risk low risk for intentional, planned out and executed harm to self or others given advanced age, physical frailty and multi-domain cognitive deficits. More likely to unintentionally/accidentally lash out at caretakers during ADL assistance or hurt self by trying to climb out of bed/pulls lines etc secondary to his chronically confused state. Protective factors also being  medication and treatment compliant; no history of psych hospitalizations, no known suicide attempts; no self-injurious behavior; no hx of aggression/violence; no legal issues; lives in supervised residence w/ family; strong family support; access to health services. No acute risk factors identified

## 2020-02-06 NOTE — BEHAVIORAL HEALTH ASSESSMENT NOTE - NSBHCHARTREVIEWVS_PSY_A_CORE FT
T(C): 36.8 (02-06-20 @ 11:14), Max: 37.1 (02-06-20 @ 05:22)  HR: 77 (02-06-20 @ 11:14) (77 - 88)  BP: 156/80 (02-06-20 @ 11:14) (145/73 - 157/91)  RR: 18 (02-06-20 @ 11:14) (16 - 18)  SpO2: 94% (02-06-20 @ 11:14) (93% - 96%)

## 2020-02-06 NOTE — DISCHARGE NOTE NURSING/CASE MANAGEMENT/SOCIAL WORK - PATIENT PORTAL LINK FT
You can access the FollowMyHealth Patient Portal offered by [facility] by registering at the following website: [email or web address]. By joining [employer]’s FollowMyHealth portal, you will also be able to view your health information using other applications (apps) compatible with our system.

## 2020-02-06 NOTE — BEHAVIORAL HEALTH ASSESSMENT NOTE - OTHER
low end of fair deferred at this time unable to elicit any delusions at this time does not seem to be responding to internal stimuli see HPI

## 2020-02-06 NOTE — BEHAVIORAL HEALTH ASSESSMENT NOTE - DESCRIPTION
hx of confusion 2/2019; BPH (benign prostatic hypertrophy); DM Type 2 NIDDM; GERD: HLD: HTN; PVD; Foot ulcer; hx of syncope; hx of being unresponsive (starts drooling, slumps over to the R side and unresponsive for a few minutes_; hx of seizure-like episode (sitting at the table when he started to lean over, his hands started shaking and his eyes twitching lasting ten minutes, during this he was making sense at times other times not)

## 2020-02-06 NOTE — BEHAVIORAL HEALTH ASSESSMENT NOTE - SUMMARY
- Levaquin has been known to cause confusion in the elderly  - Keppra and Aricept's side effects also includes confusion/AMS - Levaquin has been known to cause confusion in the elderly  - Keppra and Aricept's side effects also includes confusion/AMS. If this is the case, Patient's family can discuss this with his prescriber (Neurologist) to see if agents can be changed chronic Dementia with bout of delirium  - Levaquin has been known to cause confusion in the elderly  - Keppra and Aricept's side effects also includes confusion/AMS. If this is the case, Patient's family can discuss this with his prescriber (Neurologist) to see if agents can be changed  - the illusions seems to be sporadic and nondistressing so would not treat. Likely could be baseline. Reorientation and reassurance should suffice

## 2020-02-07 VITALS — SYSTOLIC BLOOD PRESSURE: 137 MMHG | DIASTOLIC BLOOD PRESSURE: 76 MMHG | HEART RATE: 86 BPM

## 2020-02-07 LAB
ANION GAP SERPL CALC-SCNC: 8 MMOL/L — SIGNIFICANT CHANGE UP (ref 5–17)
BUN SERPL-MCNC: 14 MG/DL — SIGNIFICANT CHANGE UP (ref 7–23)
CALCIUM SERPL-MCNC: 9.4 MG/DL — SIGNIFICANT CHANGE UP (ref 8.5–10.1)
CHLORIDE SERPL-SCNC: 106 MMOL/L — SIGNIFICANT CHANGE UP (ref 96–108)
CO2 SERPL-SCNC: 27 MMOL/L — SIGNIFICANT CHANGE UP (ref 22–31)
CREAT SERPL-MCNC: 1.33 MG/DL — HIGH (ref 0.5–1.3)
GLUCOSE BLDC GLUCOMTR-MCNC: 133 MG/DL — HIGH (ref 70–99)
GLUCOSE SERPL-MCNC: 128 MG/DL — HIGH (ref 70–99)
HCT VFR BLD CALC: 35.1 % — LOW (ref 39–50)
HGB BLD-MCNC: 12.1 G/DL — LOW (ref 13–17)
MAGNESIUM SERPL-MCNC: 1.9 MG/DL — SIGNIFICANT CHANGE UP (ref 1.6–2.6)
MCHC RBC-ENTMCNC: 29.4 PG — SIGNIFICANT CHANGE UP (ref 27–34)
MCHC RBC-ENTMCNC: 34.5 GM/DL — SIGNIFICANT CHANGE UP (ref 32–36)
MCV RBC AUTO: 85.4 FL — SIGNIFICANT CHANGE UP (ref 80–100)
NRBC # BLD: 0 /100 WBCS — SIGNIFICANT CHANGE UP (ref 0–0)
PHOSPHATE SERPL-MCNC: 2.3 MG/DL — LOW (ref 2.5–4.5)
PLATELET # BLD AUTO: 336 K/UL — SIGNIFICANT CHANGE UP (ref 150–400)
POTASSIUM SERPL-MCNC: 3.6 MMOL/L — SIGNIFICANT CHANGE UP (ref 3.5–5.3)
POTASSIUM SERPL-SCNC: 3.6 MMOL/L — SIGNIFICANT CHANGE UP (ref 3.5–5.3)
RBC # BLD: 4.11 M/UL — LOW (ref 4.2–5.8)
RBC # FLD: 13.5 % — SIGNIFICANT CHANGE UP (ref 10.3–14.5)
SODIUM SERPL-SCNC: 141 MMOL/L — SIGNIFICANT CHANGE UP (ref 135–145)
WBC # BLD: 7.78 K/UL — SIGNIFICANT CHANGE UP (ref 3.8–10.5)
WBC # FLD AUTO: 7.78 K/UL — SIGNIFICANT CHANGE UP (ref 3.8–10.5)

## 2020-02-07 PROCEDURE — 99239 HOSP IP/OBS DSCHRG MGMT >30: CPT

## 2020-02-07 RX ORDER — ATENOLOL 25 MG/1
1 TABLET ORAL
Qty: 0 | Refills: 0 | DISCHARGE
Start: 2020-02-07

## 2020-02-07 RX ORDER — SODIUM,POTASSIUM PHOSPHATES 278-250MG
1 POWDER IN PACKET (EA) ORAL ONCE
Refills: 0 | Status: COMPLETED | OUTPATIENT
Start: 2020-02-07 | End: 2020-02-07

## 2020-02-07 RX ADMIN — Medication 81 MILLIGRAM(S): at 11:20

## 2020-02-07 RX ADMIN — Medication 200 MILLIGRAM(S): at 05:05

## 2020-02-07 RX ADMIN — Medication 2 SPRAY(S): at 11:21

## 2020-02-07 RX ADMIN — LEVETIRACETAM 500 MILLIGRAM(S): 250 TABLET, FILM COATED ORAL at 05:06

## 2020-02-07 RX ADMIN — HEPARIN SODIUM 5000 UNIT(S): 5000 INJECTION INTRAVENOUS; SUBCUTANEOUS at 05:06

## 2020-02-07 RX ADMIN — CILOSTAZOL 100 MILLIGRAM(S): 100 TABLET ORAL at 05:06

## 2020-02-07 RX ADMIN — Medication 1 PACKET(S): at 14:04

## 2020-02-07 RX ADMIN — Medication 200 MILLIGRAM(S): at 11:21

## 2020-02-07 RX ADMIN — ATENOLOL 100 MILLIGRAM(S): 25 TABLET ORAL at 05:06

## 2020-02-07 RX ADMIN — Medication 2 SPRAY(S): at 05:07

## 2020-02-07 RX ADMIN — PANTOPRAZOLE SODIUM 40 MILLIGRAM(S): 20 TABLET, DELAYED RELEASE ORAL at 05:07

## 2020-02-07 RX ADMIN — Medication 1 APPLICATION(S): at 05:04

## 2020-02-07 RX ADMIN — LOSARTAN POTASSIUM 100 MILLIGRAM(S): 100 TABLET, FILM COATED ORAL at 05:06

## 2020-02-07 NOTE — PROGRESS NOTE ADULT - ASSESSMENT
78 y/o male w/PMH of mild dementia, HLD, HTN, DM2, CKD III, PVD, BPH, GERD, PVD & seizure disorder presented w/ generalized weakness.    Weakness   - LATISHA on discharge     Respiratory distress   - chest x-ray showed cardiomegaly w/ no acute changes  - pulmonary note read and appreciated   - Echo 55-60% G1DD.    BELIA  - resolved     PVD  - c/w cilostazol  & ASA    DM II  - c/w ISS  - Hgb A1C is 8.2    Hypertension  - c/w losartan and increase Atenolol     BPH  - c/w Flomax     Dementia  - patient has frequent Hallucinations (spiders)  - c/w Aricept   - c/w supportive care  - psych note read and appreciated     Seizure   - c/w Keppra     Prophylaxis:   DVT: Heparin  GI: Protonix  Disposition: LATISHA once BP is better.

## 2020-02-07 NOTE — PROGRESS NOTE ADULT - SUBJECTIVE AND OBJECTIVE BOX
Patient is a 79y old  Male who presents with a chief complaint of Weakness and redness, pain left foot.      Subjective: Pt seen and examined.      REVIEW OF SYSTEMS: Unable to obtain as pt is a poor historian sec to dementia.      MEDICATIONS  (STANDING):  aspirin  chewable 81 milliGRAM(s) Oral daily  ATENolol  Tablet 100 milliGRAM(s) Oral daily  cilostazol 100 milliGRAM(s) Oral two times a day  dextrose 5%. 1000 milliLiter(s) (50 mL/Hr) IV Continuous <Continuous>  dextrose 50% Injectable 12.5 Gram(s) IV Push once  dextrose 50% Injectable 25 Gram(s) IV Push once  dextrose 50% Injectable 25 Gram(s) IV Push once  donepezil 10 milliGRAM(s) Oral at bedtime  heparin  Injectable 5000 Unit(s) SubCutaneous every 12 hours  insulin lispro (HumaLOG) corrective regimen sliding scale   SubCutaneous three times a day before meals  levETIRAcetam 500 milliGRAM(s) Oral two times a day  losartan 100 milliGRAM(s) Oral daily  mineral oil for Topical Use 1 Application(s) Topical three times a day  pantoprazole    Tablet 40 milliGRAM(s) Oral before breakfast  sodium chloride 0.65% Nasal 2 Spray(s) Both Nostrils every 6 hours  tamsulosin 0.4 milliGRAM(s) Oral at bedtime    MEDICATIONS  (PRN):  dextrose 40% Gel 15 Gram(s) Oral once PRN Blood Glucose LESS THAN 70 milliGRAM(s)/deciliter  glucagon  Injectable 1 milliGRAM(s) IntraMuscular once PRN Glucose LESS THAN 70 milligrams/deciliter  guaiFENesin   Syrup  (Sugar-Free) 200 milliGRAM(s) Oral every 6 hours PRN Cough      Vital Signs Last 24 Hrs  T(C): 36.2 (2020 11:45), Max: 36.8 (2020 04:43)  T(F): 97.2 (2020 11:45), Max: 98.2 (2020 04:43)  HR: 86 (2020 13:40) (77 - 97)  BP: 137/76 (2020 13:40) (137/76 - 182/108)  BP(mean): --  RR: 18 (2020 11:48) (17 - 18)  SpO2: 96% (2020 11:48) (94% - 99%)      PHYSICAL EXAM:    GENERAL: NAD  NERVOUS SYSTEM:  Pt is alert, No focal deficits.  CHEST/LUNG: Clear to auscultation bilaterally  HEART: S1 S2+, Regular rate and rhythm  ABDOMEN: Soft, Nontender, Nondistended; Bowel sounds present  EXTREMITIES:  No clubbing, cyanosis, or edema, Dry skin B/l Legs      Daily     Daily Weight in k.8 (2020 04:43)  I&O's Summary    2020 07:01  -  2020 07:00  --------------------------------------------------------  IN: 420 mL / OUT: 0 mL / NET: 420 mL                            12.1   7.78  )-----------( 336      ( 2020 08:53 )             35.1   02-07    141  |  106  |  14  ----------------------------<  128<H>  3.6   |  27  |  1.33<H>    Ca    9.4      2020 08:53  Phos  2.3     02-07  Mg     1.9     02-07    CAPILLARY BLOOD GLUCOSE      POCT Blood Glucose.: 133 mg/dL (2020 11:15)  POCT Blood Glucose.: 117 mg/dL (2020 07:24)  POCT Blood Glucose.: 134 mg/dL (2020 20:54)  POCT Blood Glucose.: 118 mg/dL (2020 16:39)        RADIOLOGY & ADDITIONAL STUDIES:

## 2020-02-07 NOTE — PROGRESS NOTE ADULT - REASON FOR ADMISSION
Weakness and redness, pain left foot.

## 2020-09-30 NOTE — ED ADULT NURSE NOTE - NS ED NURSE RECORD ANOTHER HT AND WT
Yes Cellcept Pregnancy And Lactation Text: This medication is Pregnancy Category D and isn't considered safe during pregnancy. It is unknown if this medication is excreted in breast milk.

## 2021-03-04 NOTE — DISCHARGE NOTE PROVIDER - NS AS DC PROVIDER CONTACT Y/N MULTI
"Telemedicine Visit: Established Patient     This encounter was conducted via zoom.   Verbal consent was obtained. Patient's identity was verified.    Subjective:     Chief Complaint   Patient presents with   • Follow-Up     pain and shortness of breath. Tightness in lungs      Malena Bennett is a 20 y.o. female presenting for evaluation and management of following problems:    Hospital discharge follow-up  I reviewed hospital note and records from 2/26/20. Pt originally saw me on 2/26/21 for acute onset of thoracic back pain radiating around her Right flank. Associated symptoms nausea w/out vomiting and pain triggered after ingesting food. She was seen in ED on the 26th and also 2/28 in UC ( note mentions pt had \"hands-on with pt\"?!). Her lab work and CTabd/pelvis benign.   I did POC UA which was neg.    Today pt reports tenderness on top right side of her chest at pectoral muscle. Associated symptom is intermittent SOB, without CP, chest tightness or cough. Pt denies pain and describes it as intermittent tenderness at rest and occasional movement. 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 However, she can't confirm or deny an incident prior to onset of her symptoms.   Pt has Hx of asthma, dx'd at 8, well controlled w/out use of inhaler.   Otherwise denies excessive anxiety, stress, sleep issues.   Her nausea resolved and as pt states started after initiation of new medication Methotrexate by rhematology for her lupus control.         ROS   Denies any recent fevers or chills. No nausea or vomiting. No chest pains or shortness of breath.     Allergies   Allergen Reactions   • Cefdinir Rash     rash   • Erythromycin Vomiting and Nausea       Current medicines (including changes today)  Current Outpatient Medications   Medication Sig Dispense Refill   • HYDROcodone-acetaminophen (NORCO) 5-325 MG Tab per tablet Take 1 tablet by mouth " every four hours as needed.     • albuterol 108 (90 Base) MCG/ACT Aero Soln inhalation aerosol Inhale 2 Puffs every 6 hours as needed for Shortness of Breath. 8.5 g 1   • methotrexate 2.5 MG Tab      • ondansetron (ZOFRAN ODT) 8 MG TABLET DISPERSIBLE Take 1 tablet by mouth every 8 hours as needed for Nausea. 30 tablet 1   • lidocaine (LIDODERM) 5 % Patch Place 1 Patch on the skin every 24 hours. 10 Patch 0   • promethazine (PHENERGAN) 25 MG Tab Take 1 tablet by mouth every 6 hours as needed for Nausea/Vomiting. 15 tablet 0   • ketorolac (TORADOL) 10 MG Tab Take 1 Tab by mouth every 12 hours. 28 Tab 0   • DULoxetine (CYMBALTA) 30 MG Cap DR Particles Take 1 Cap by mouth 2 times a day. 60 Cap 1   • insulin glargine (LANTUS SOLOSTAR) 100 UNIT/ML Solution Pen-injector injection Inject 30 Units under the skin every evening. 15 mL 11   • Continuous Blood Gluc  (DEXCOM G6 ) Device 1 Applicator continuous. 1 Each 1   • Continuous Blood Gluc Sensor (DEXCOM G6 SENSOR) Misc 1 Applicator every 10 days. 9 Each 3   • Continuous Blood Gluc Transmit (DEXCOM G6 TRANSMITTER) Misc 1 Applicator every 3 months. 2 Each 2   • vitamin D, Ergocalciferol, (DRISDOL) 1.25 MG (81957 UT) Cap capsule Take 1 Cap by mouth every 7 days. 12 Cap 0   • tizanidine (ZANAFLEX) 6 MG capsule Take 8 mg by mouth 3 times a day. Taking 8mg     • cyanocobalamin (VITAMIN B-12) 1000 MCG/ML Solution Inject 1 mL intramuscularly every 14 days. 12 mL 0   • hydroxychloroquine (PLAQUENIL) 200 MG Tab Take 400 mg by mouth every day.     • Blood Glucose Test Strips Test strips order: Test strips for meter. Sig: use tid and prn ssx high or low sugar #100 RF x 3 400 Strip 3   • insulin lispro, Human, (HUMALOG KWIKPEN) 100 UNIT/ML injection PEN Inject 2-10 Units as instructed 3 times a day before meals. 15 mL 6   • FREESTYLE LITE strip      • Insulin Pen Needle 32 G x 4 mm 1 Each by Does not apply route 3 times a day. 100 Each 11   • Glucagon, rDNA, 1 MG Kit  "Use one pen prn severe hypoglycemia 1 Kit 1   • Lancets Lancets order: Lancets for glucometer. Sig: use tid and prn ssx high or low sugar. #100 RF x 3 100 Each 3   • Syringe/Needle, Disp, (SYRINGE 3CC/86GC2-2/2\") 22G X 1-1/2\" 3 ML Misc For use with Intra-muscular or subcutaneous B 12  injection every 2 weeks. 12 Each 1   • glucose monitoring kit (FREESTYLE) monitoring kit Use to measure blood glucose regularly. 1 Kit 0     No current facility-administered medications for this visit.       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   • History of cholecystectomy 08/22/2019   • Celiac disease 08/22/2019   • Uncontrolled type 2 diabetes mellitus with hyperglycemia ([facility]) 08/12/2019   • POTS (postural orthostatic tachycardia syndrome) 12/17/2018   • Hyperglycemia 12/14/2018   • Other irritable bowel syndrome 08/10/2018   • Salt craving 02/02/2018   • Hypoglycemia 02/02/2018   • Chronic tension-type headache, not intractable 11/09/2016   • Mittelschmerz 09/19/2016   • Tourette syndrome 09/19/2016   • OCD (obsessive compulsive disorder) 09/19/2016   • Factor V Leiden (HCC)        Family History   Problem Relation Age of Onset   • Other Maternal Grandfather         Parkinson's   • Genetic Disorder Maternal Grandfather         Parkinsons, and crouzon syndrome   • Cancer Maternal Grandfather         Skin Cancer   • Heart Disease Other    • Cancer Other         bile duct cancer   • Other Mother         sphincter of salud, pancreatic " "insufficiency   • Cancer Other         great uncle brain   • Arthritis Maternal Grandmother    • Cancer Maternal Grandmother         Skin Cancer   • Heart Disease Maternal Grandmother    • Hypertension Maternal Grandmother    • Hyperlipidemia Maternal Grandmother    • Stroke Maternal Grandmother    • Genetic Disorder Father         Factor V Liden and Tourettes   • Heart Disease Father    • Hyperlipidemia Father    • Genetic Disorder Maternal Uncle         Crouzon Syndrome   • Cancer Maternal Uncle         Bile Duct, and Skin cancer   • Cancer Maternal Aunt         Multiple Aunts and Uncles passed from cancer   • Heart Disease Maternal Aunt         Open heart surgery occuring   • Heart Disease Paternal Grandmother    • Hypertension Paternal Grandmother    • Stroke Paternal Grandmother    • Heart Disease Maternal Uncle         multiple uncles.       She  has a past medical history of Asthma, Clotting disorder (HCC), Factor 5 Leiden mutation, heterozygous (HCC), OCD (obsessive compulsive disorder), and Tourette disease.  She  has a past surgical history that includes tavo by laparoscopy (1/30/2017); appendectomy laparoscopic (7/1/2017); and appendectomy.       Objective:   Vitals obtained by patient:  BP (!) 94/50   Pulse (!) 128   Ht 1.702 m (5' 7\")   Wt 86.2 kg (190 lb)   SpO2 94%   BMI 29.76 kg/m²      Physical Exam:  General: No acute distress. Well nourished.   HEENT: EOM grossly intact, no scleral icterus, no pharyngeal erythema.   Neck:  No JVD noted at 90 degrees, trachea midline  CVS: Pulse as reported by patient, no visible LE edema.  Resp: Unlabored respiratory effort, no cough or audible wheeze  MSK/Ext: No clubbing or cyanosis visible appreciated.  Skin: No rashes in visible areas.  Neurological: AOx3. CN III-XII grossly intact. No focal deficits.     LABS: 2/2021 results reviewed and discussed with the patient, questions answered.    My total time spent caring for the patient on the day of the " encounter was 25 minutes.   This does not include time spent on separately billable procedures/tests.   Assessment and Plan:   1. SOB (shortness of breath)  Will obtain CXR, PFT to check the status of asthma. Will order standing pancreatic labs per pt request to check in a month.  - DX-CHEST-2 VIEWS; Future  - PULMONARY FUNCTION TESTS -Test requested: Complete Pulmonary Function Test; Future  - LIPASE; Future  - AMYLASE; Future    2. History of asthma  - DX-CHEST-2 VIEWS; Future  - PULMONARY FUNCTION TESTS -Test requested: Complete Pulmonary Function Test; Future  - albuterol 108 (90 Base) MCG/ACT Aero Soln inhalation aerosol; Inhale 2 Puffs every 6 hours as needed for Shortness of Breath.  Dispense: 8.5 g; Refill: 1    3. Muscle tenderness  Will expedite ref per pt request  - REFERRAL TO [facility]    4. Hospital discharge follow-up       Follow-up: No follow-ups on file.           Yes

## 2021-06-17 NOTE — ED ADULT NURSE NOTE - IN THE PAST 12 MONTHS HAVE YOU USED DRUGS OTHER THAN THOSE REQUIRED FOR MEDICAL REASON?
Attempted to call patient regarding her family hx. Need cause of death of her parents to be documented in 6/1 encounter. Left message for patient or her daughter to return call.   
Spoke with patient and was able to obtain information needed below. Documented in 6/1 OV encounter.   
No

## 2022-01-04 NOTE — DISCHARGE NOTE PROVIDER - NSDCHHHOMEBOUND_GEN_ALL_CORE
Ataxic gait
27yF w/no stated pmhx presenting with dry cough, fever and loss of taste x today. Pt states this morning she noticed a dry cough, developed a fever to 102 and noticed decreased sense of taste/smell. Pt took ibuprofen prior to ED arrival. Pt denies chills, chest pain, shortness of breath, palpitations, abd pain, n/v/d, headache, dizziness, leg pain or swelling, recent travel or any other concerns. Pt is vaccinated for covid.

## 2022-03-11 NOTE — ED ADULT TRIAGE NOTE - ACCOMPANIED BY
VIDEO VISIT PROGRESS NOTE    CHIEF COMPLAINT  Video Visit and Sinus Problem      SUBJECTIVE  This visit was performed via  live interactive two-way video.    Clinician Location: [facility]   Patient Location: Work  She is either an established patient of mine or is a patient of one of my clinic partners who is currently unavailable.  She is in [place] and her identity has been established.   Sydni understands that we are limiting office visits due to the coronavirus pandemic and she consents to a virtual visit with charges submitted to her insurance.     Sydni CUEVAS Asiha is presenting for siinusitis.   Hx of COVID 12/2022. Was treated for sinusitis in 1/2022 with Vantin.   Resolved for a few weeks after abx treatment. But then symptoms have now returned.   Tooth pain, ear pain, sinus pain, sore throat, drainage down throat and cough. temp 99 degrees.   Taking claritin D mucinex  And tylenol. Flonase in morning.      MEDICATIONS  Medications were reviewed and updated today.    HISTORIES  I have personally reviewed and updated the following EMR sections: Current medications, Allergies, Problem list and Past Medical History    REVIEW OF SYSTEMS  As noted in HPI    PHYSICAL EXAM  There were no vitals taken for this visit.   General; Patient alert and oriented x3, no acute distress.  Psych: Normal affect, appropriately dressed and answering questions appropriately.     ASSESSMENT/PLAN  Acute non-recurrent sinusitis, unspecified location  Recurrent vs new infection  Symptoms ongoing 7 days, not improving with all OTC agents.    Discussed abx treatment.  Could wait a few more days prior to initiation. If not improved, then abx.   Augmentin BID x 10 days.   Continue OTC agents using.     Supervising physician Dr. Ernesto Ace.      
Immediate family member

## 2022-08-24 NOTE — CONSULT NOTE ADULT - ASSESSMENT
Uncontrolled Type 2 DM, Hgb A1c of 12.5% on this admission.     - Start Humalog 5 units TID AC.  - Continue Lantus 10 units BID.   - On Moderate SSI AC.  - Monitor finger sticks and adjust accordingly.     Thank you for allowing us to participate in patient's care. PAST MEDICAL HISTORY:  AF (paroxysmal atrial fibrillation)     Atrial flutter     Essential hypertension     HTN (hypertension)

## 2022-09-12 NOTE — PHYSICAL THERAPY INITIAL EVALUATION ADULT - GAIT DEVIATIONS NOTED, PT EVAL
increased time in double stance/decreased stride length/decreased weight-shifting ability/decreased rivera/decreased step length Hatchet Flap Text: The defect edges were debeveled with a #15 scalpel blade.  Given the location of the defect, shape of the defect and the proximity to free margins a hatchet flap was deemed most appropriate.  Using a sterile surgical marker, an appropriate hatchet flap was drawn incorporating the defect and placing the expected incisions within the relaxed skin tension lines where possible.    The area thus outlined was incised deep to adipose tissue with a #15 scalpel blade.  The skin margins were undermined to an appropriate distance in all directions utilizing iris scissors.

## 2024-02-09 NOTE — PHYSICAL THERAPY INITIAL EVALUATION ADULT - PHYSICAL ASSIST/NONPHYSICAL ASSIST: SIT/SUPINE, REHAB EVAL
Addended by: BEVERLEY MIRELES on: 2/9/2024 05:49 PM     Modules accepted: Orders     1 person assist

## 2024-08-05 NOTE — ED ADULT NURSE NOTE - RESPIRATORY WDL
Breathing spontaneous and unlabored. Breath sounds clear and equal bilaterally with regular rhythm. Additional Safety/Bands:

## 2025-02-23 NOTE — PHYSICAL THERAPY INITIAL EVALUATION ADULT - GROSSLY INTACT, SENSORY
Chief Complaint   Patient presents with    Cough     Patient states this started Thursday  is covid positive    Nasal Congestion    Headache    Sore Throat       Physical Exam:     GEN: No acute distress    ENT: Bilateral TMs and canals unremarkable, sinus congestion present. Pharynx and tonsils mildly hyperemic but without exudate.     Resp: Lungs clear to auscultation bilaterally       POC Labs:     Office Visit on 02/23/2025   Component Date Value Ref Range Status    POC FRANKIE-COV-2 AG 02/23/2025 Positive test for SARS-CoV-2 (antigen detected) (A)  Presumptive negative test for SARS-CoV-2 (no antigen detected) Final        Encounter Diagnosis   Name Primary?    COVID Yes        Medical Decision Making & Plan:     Paxlovid  Prednisone       02/23/25 at 5:08 PM - Wendy Rosen, DO     Grossly Intact

## 2025-03-31 NOTE — PATIENT PROFILE ADULT - HAVE YOU EXPERIENCED VIOLENCE OR A TRAUMATIC EVENT?
Chief Complaint   Patient presents with    Post-Op Check     2wk p/o ORIF Right Lisfranc Type A Fx 3/12/25.  Splint intact to Right lower leg. States she has occasional pain 5/10.  C/O \"popping\"sensation to right lateral foot.       OP:SURGEON: Dr. Ron Pathak DO  DATE OF PROCEDURE: 3/12/25  PROCEDURE:  Right first tarsometatarsal joint dislocation open reduction with internal fixation  2. Right second tarsometatarsal joint dislocation open reduction with internal fixation  3. Right third tarsometatarsal joint dislocation closed reduction requiring manipulation  4. Right fourth tarsometatarsal joint dislocation closed reduction with percutaneous pin fixation  5. Right fifth tarsometatarsal joint dislocation closed reduction percutaneous pin fixation       Subjective:  Bela Cifuentes is approximately 2 weeks follow-up from the above surgery. She is still NWB.  Pain is controlled.  Taking aspirin for DVT prophylaxis.  Denies calf pain, CP, SOB, fever, chills, myalgias.    Review of Systems -  all pertinent positives and negatives in HPI.      Objective:    General: Alert and oriented X 3, normocephalic atraumatic, external ears and eye normal, sclera clear, no acute distress, respirations easy and unlabored with no audible wheezes, skin warm and dry, speech and dress appropriate for noted age, affect euthymic.    Extremity:  Right Lower Extremity  Skin clean dry and intact, without signs of infection  Incisions well approximated without signs of redness, warmth or drainage- sutures intact  Percutaneous pins x 2 intact, pin sites without signs infection  Mild edema throughout the foot  Compartments supple throughout thigh and leg  Calf supple and nontender  Demonstrates active knee flexion/extension, ankle plantar/dorsiflexion/great toe extension.   States sensation intact to touch in sural/deep peroneal/superficial peroneal/saphenous/posterior tibial nerve distributions to foot/ankle.   Palpable dorsalis pedis  no
